# Patient Record
Sex: FEMALE | Race: WHITE | NOT HISPANIC OR LATINO | Employment: FULL TIME | URBAN - METROPOLITAN AREA
[De-identification: names, ages, dates, MRNs, and addresses within clinical notes are randomized per-mention and may not be internally consistent; named-entity substitution may affect disease eponyms.]

---

## 2017-10-05 LAB — HPV ABSTRACT: NORMAL

## 2018-05-25 ENCOUNTER — TRANSFERRED RECORDS (OUTPATIENT)
Dept: HEALTH INFORMATION MANAGEMENT | Facility: CLINIC | Age: 53
End: 2018-05-25

## 2019-09-18 ENCOUNTER — OFFICE VISIT (OUTPATIENT)
Dept: FAMILY MEDICINE | Facility: CLINIC | Age: 54
End: 2019-09-18
Payer: COMMERCIAL

## 2019-09-18 VITALS
HEIGHT: 66 IN | OXYGEN SATURATION: 97 % | TEMPERATURE: 97.6 F | BODY MASS INDEX: 23.18 KG/M2 | HEART RATE: 65 BPM | DIASTOLIC BLOOD PRESSURE: 72 MMHG | WEIGHT: 144.25 LBS | SYSTOLIC BLOOD PRESSURE: 107 MMHG

## 2019-09-18 DIAGNOSIS — Z13.820 SCREENING FOR OSTEOPOROSIS: ICD-10-CM

## 2019-09-18 DIAGNOSIS — Z12.31 VISIT FOR SCREENING MAMMOGRAM: ICD-10-CM

## 2019-09-18 DIAGNOSIS — E03.9 HYPOTHYROIDISM WITH GOITER: ICD-10-CM

## 2019-09-18 DIAGNOSIS — Z80.0 FAMILY HISTORY OF COLON CANCER: ICD-10-CM

## 2019-09-18 DIAGNOSIS — E04.9 HYPOTHYROIDISM WITH GOITER: ICD-10-CM

## 2019-09-18 DIAGNOSIS — Z00.00 ANNUAL PHYSICAL EXAM: Primary | ICD-10-CM

## 2019-09-18 DIAGNOSIS — R79.89 ELEVATED TSH: ICD-10-CM

## 2019-09-18 DIAGNOSIS — E03.9 HYPOTHYROIDISM, UNSPECIFIED TYPE: ICD-10-CM

## 2019-09-18 LAB
ERYTHROCYTE [DISTWIDTH] IN BLOOD BY AUTOMATED COUNT: 12.5 %
HBA1C MFR BLD: 5.1 % (ref 4.1–5.7)
HCT VFR BLD AUTO: 40.6 % (ref 35–47)
HEMOGLOBIN: 13.3 G/DL (ref 11.7–15.7)
MCH RBC QN AUTO: 30.4 PG (ref 26.5–35)
MCHC RBC AUTO-ENTMCNC: 32.8 G/DL (ref 32–36)
MCV RBC AUTO: 92.9 FL (ref 78–100)
PLATELET # BLD AUTO: 281 K/UL (ref 150–450)
RBC # BLD AUTO: 4.37 M/UL (ref 3.8–5.2)
T4 FREE SERPL-MCNC: 0.66 NG/DL (ref 0.76–1.46)
TSH SERPL DL<=0.005 MIU/L-ACNC: 7.43 MU/L (ref 0.4–4)
WBC # BLD AUTO: 6.2 K/UL (ref 4–11)

## 2019-09-18 ASSESSMENT — MIFFLIN-ST. JEOR: SCORE: 1277.68

## 2019-09-18 NOTE — NURSING NOTE
"53 year old  Chief Complaint   Patient presents with     Establish Care     Physical     pt wants to discuss thyroid medication.       Blood pressure 107/72, pulse 65, temperature 97.6  F (36.4  C), temperature source Oral, height 1.687 m (5' 6.42\"), weight 65.4 kg (144 lb 4 oz), SpO2 97 %, not currently breastfeeding. Body mass index is 22.99 kg/m .  There is no problem list on file for this patient.      Wt Readings from Last 2 Encounters:   09/18/19 65.4 kg (144 lb 4 oz)     BP Readings from Last 3 Encounters:   09/18/19 107/72         No current outpatient medications on file.     No current facility-administered medications for this visit.        Social History     Tobacco Use     Smoking status: Never Smoker     Smokeless tobacco: Never Used   Substance Use Topics     Alcohol use: Yes     Comment: 7 drinks a week     Drug use: Never       Health Maintenance Due   Topic Date Due     PREVENTIVE CARE VISIT  1964     HEPATITIS C SCREENING  1964     MAMMO SCREENING  1964     PAP  1964     COLONOSCOPY  09/24/1974     HIV SCREENING  09/24/1979     DTAP/TDAP/TD IMMUNIZATION (1 - Tdap) 09/24/1989     LIPID  09/24/2009     ZOSTER IMMUNIZATION (1 of 2) 09/24/2014     PHQ-2  01/01/2019     INFLUENZA VACCINE (1) 09/01/2019       No results found for: PAP      September 18, 2019 1:46 PM  "

## 2019-09-18 NOTE — LETTER
[facility]  [address]  [place] [address]  Phone: [phone]  Fax: [phone]       Date: September 23, 2019      Earnestine León Link  [address]  [facility] [address]        Dear Earnestine,    Enclosed are you lab results.  Your TSH is elevated and the T4 is suppressed.  I will send a prescription to the [facility] on [address] for levothyroxine 50mcg daily.  Be consistent with dosing, take it at the same time daily.     I would like you to return in 6 wks to have your TSH and T4 levels rechecked.    Your blood counts look perfect, there is no anemia or other abnormal findings.    Your A1c is normal, no concern for diabetes.    It was a pleasure meeting you, thanks for choosing [facility] for your care.    Sincerely,    Trish Aguilar MD  Internal Medicine/Pediatrics    Resulted Orders   Hemoglobin A1c ([place])   Result Value Ref Range    Hemoglobin A1C 5.1 4.1 - 5.7 %   TSH with free T4 reflex   Result Value Ref Range    TSH 7.43 (H) 0.40 - 4.00 mU/L   CBC with Plt ([facility])   Result Value Ref Range    WBC 6.2 4.0 - 11.0 K/uL    RBC 4.37 3.80 - 5.20 M/uL    Hemoglobin 13.3 11.7 - 15.7 g/dL    Hematocrit 40.6 35.0 - 47.0 %    MCV 92.9 78.0 - 100.0 fL    MCH 30.4 26.5 - 35.0 pg    MCHC 32.8 32.0 - 36.0 g/dL    Platelets 281.0 150.0 - 450.0 K/uL    RDW 12.5 %   T4 free   Result Value Ref Range    T4 Free 0.66 (L) 0.76 - 1.46 ng/dL

## 2019-09-18 NOTE — PROGRESS NOTES
Earnestine Mcpherson is here for a general check up.  She is new to our clinic, and was previously receiving care at [facility]. She is not fasting. She is up to date on eye exams and dental visits. Wears seat belt-yes. Bike helmet- yes.   Concerns today:    HCM  Earnestine is a 53 year old female here for check up.  She eats a generally healthy diet, but admits she could improve her diet.  Eats meat occasionally.  Mostly cooks at home.  No major weight changes, has lost some weight.  She is due for a mammogram, ordered today.  Pap completed 10/2017, normal, next due 2020.  Her colonoscopy was last completed in 01/2016, next due 2021.  She has a family hx of colon cancer and polyps.  Her immunizations are up to date.  Recommend influenza vaccine when available.  Discussed recommendation for shingles vaccine when available.  She has not received a DEXA scan.    Situational Stress  Earnestine has had some difficulty adjusting to her recent move from [place] (where she lived for 21 yrs) to [place].  She knows this move was a good decision to move, but is still in the process of adjusting to her new apartment.  She has had some difficulty falling asleep, and she believes this is a situational issue.  No hx of sleep issues.    Hypothyroidism  Earnestine has been on levothyroxine in the past, but stopped taking medication about a year ago.   She states that her TSH was normal when last checked, which caused her to stop medication.  Both of her sisters have hypothyroidism.  No constipation, diarrhea, skin changes or hair loss.    Hearing Concerns  Earnestine has concerns about potential hearing loss, as two of her siblings near her age are having difficulty hearing.  She has never had any hearing issues.  No hx of ear infections.    Family History of Diabetes  Earnestine reports family history of type II DM in thin family members, including her brother and father. No hx of gestational diabetes.  She is  requesting testing.       Health Maintenance   Topic Date Due     PREVENTIVE CARE VISIT  1964     HEPATITIS C SCREENING  1964     MAMMO SCREENING  1964     PAP  1964     COLONOSCOPY  1974     HIV SCREENING  1979     DTAP/TDAP/TD IMMUNIZATION (1 - Tdap) 1989     LIPID  2009     ZOSTER IMMUNIZATION (1 of 2) 2014     PHQ-2  2019     INFLUENZA VACCINE (1) 2019     IPV IMMUNIZATION  Aged Out     MENINGITIS IMMUNIZATION  Aged Out       There is no problem list on file for this patient.      Past Surgical History:   Procedure Laterality Date      SECTION        SECTION  1999     FINGER SURGERY Right 1966    right hand ring finger       Family History   Problem Relation Age of Onset     Hypertension Mother      Arthritis Mother      Glaucoma Mother      Diabetes Father 80     Hypothyroidism Sister      Colon Polyps Brother      Colon Cancer Other 50        paternal side     Hypothyroidism Sister      Colon Cancer Paternal Grandfather 65     Liver Cancer Paternal Aunt        Social  Recently moved to [place] from [place]  Works at [employer] a [employer]    with 3 Children, ages 26, 24 and 20    HABITS:  Tob: none  ETOH: 1-2/day  Calcium: Dairy in diet, no supplements  Caffeine: 3 coffees/day  Exercise: 4x/week; walking for 30-60 minutes    OB/GYN HISTORY:  LMP: No LMP recorded. Patient is postmenopausal. - She believes her last period was a year ago.  Hx abnormal pap?  no  STD hx?  none  Vasomotor sx:  mild  Contraception: menopausal  G 3   P 3   A 0  Self Breast exam:  yes    No current outpatient medications on file.     Allergies   Allergen Reactions     Penicillins      Breathing problems       ROS  CONSTITUTIONAL:NEGATIVE for fever, chills, change in weight  INTEGUMENTARY/SKIN: NEGATIVE for worrisome rashes, moles or lesions  EYES: NEGATIVE for vision changes or irritation  ENT/MOUTH: NEGATIVE for ear,  "mouth and throat problems  RESP:NEGATIVE for significant cough or SOB  BREAST: NEGATIVE for masses, tenderness or discharge  CV: NEGATIVE for chest pain, palpitations, FORBES, orthopnea, PND  or peripheral edema  GI: NEGATIVE for nausea, abdominal pain, heartburn, or change in bowel habits  :NEGATIVE for frequency, dysuria, or hematuria  MUSCULOSKELETAL:NEGATIVE for significant arthralgias or myalgia. Positive for intemittent right groin pain, no injury.  NEURO: NEGATIVE for weakness, dizziness or paresthesias  ENDOCRINE: hx of thyroid disease, not currently on thyroid medication  HEME/ALLERGY/IMMUNE: NEGATIVE for bleeding problems  PSYCHIATRIC: NEGATIVE for changes in mood or affect    EXAM  /72   Pulse 65   Temp 97.6  F (36.4  C) (Oral)   Ht 1.687 m (5' 6.42\")   Wt 65.4 kg (144 lb 4 oz)   SpO2 97%   Breastfeeding? No   BMI 22.99 kg/m      GENERAL APPEARANCE: Alert, pleasant, NAD  EYES: PERRL, EOMI, conjunctiva clear  HENT: TM normal bilaterally. Nose and mouth without lesions  NECK: no adenopathy, thyroid enlarged, smooth, no nodules, nontender  RESP: lungs clear to auscultation bilaterally,  BREAST: normal without masses, no tenderness or nipple discharge and no palpable  axillary masses or adenopathy  CV: regular rate and rhythm, normal S1 S2, no murmur, no carotid bruits  ABDOMEN: soft, nontender, without HSM or masses. Bowel sounds normal  MS: hips with normal internal, external ROM. No pain with palpation over lower LS or paralumbar m. No pain with palpation over lateral hips. No pain with palpation over groin or upper inner adductor muscles but she identifies this area as area of pain  SKIN: no suspicious lesions or rashes  NEURO: Normal strength and tone, sensory exam grossly normal, DTR normoreflexive in upper and lower extremities  PSYCH: mentation appears normal. and affect normal/bright.  EXT: no peripheral edema, pedal pulses palpable    Assessment:  (Z00.00) Annual physical exam  (primary " encounter diagnosis)  Comment: 53 year old female in stable health.  Plan: Hemoglobin A1c ([place]), CBC with Plt         ([facility]), AUDIOLOGY ADULT REFERRAL        Anticipatory guidance given today regarding diet, exercise and calcium intake, safety. She is up to date on colonoscopy and pap. Vaccines up to date x shingles and influenza, obtain when available.     (Z12.31) Visit for screening mammogram  Comment: Routine screening  Plan: Mammogram - routine screening         (Z13.820) Screening for osteoporosis  Comment: Routine screening  Plan: Dexa hip/pelvis/spine*            (R79.89) Elevated TSH  Comment: previous treatment for elevated TSH  Plan: TSH with free T4 reflex        Recheck and if if elevated, would resume levothyroxine in setting of enlarged thyroid gland. She is in agreement with plan.  TSH   Date Value Ref Range Status   09/18/2019 7.43 (H) 0.40 - 4.00 mU/L Final     T4 Free   Date Value Ref Range Status   09/18/2019 0.66 (L) 0.76 - 1.46 ng/dL Final     Recommend starting levothyroxine 50mcg daily, return in 6 wks for next lab check.    Trish Aguilar MD  Internal Medicine/Pediatric    I, Angus Saha, am serving as a scribe to document services personally performed by Dr. Trish Aguilar, based on data collection and the provider's statements to me. Dr. Aguilar has reviewed, edited, and approved the above note.

## 2019-09-19 PROBLEM — Z80.0 FAMILY HISTORY OF COLON CANCER: Status: ACTIVE | Noted: 2019-09-19

## 2019-09-19 PROBLEM — E04.9 HYPOTHYROIDISM WITH GOITER: Status: ACTIVE | Noted: 2019-09-19

## 2019-09-19 PROBLEM — E03.9 HYPOTHYROIDISM WITH GOITER: Status: ACTIVE | Noted: 2019-09-19

## 2019-09-19 RX ORDER — LEVOTHYROXINE SODIUM 50 UG/1
50 TABLET ORAL DAILY
Qty: 90 TABLET | Refills: 0 | Status: SHIPPED | OUTPATIENT
Start: 2019-09-19 | End: 2020-02-25

## 2019-09-20 PROBLEM — C48.8: Status: ACTIVE | Noted: 2019-09-20

## 2019-10-09 ENCOUNTER — OFFICE VISIT (OUTPATIENT)
Dept: AUDIOLOGY | Facility: CLINIC | Age: 54
End: 2019-10-09
Attending: INTERNAL MEDICINE
Payer: COMMERCIAL

## 2019-10-09 DIAGNOSIS — H90.41 SENSORINEURAL HEARING LOSS (SNHL) OF RIGHT EAR WITH UNRESTRICTED HEARING OF LEFT EAR: Primary | ICD-10-CM

## 2019-10-09 NOTE — PROGRESS NOTES
AUDIOLOGY REPORT    SUBJECTIVE:  Earnestine Mcpherson is a 54 year old female who was seen was seen in Audiology at the [facility] for audiologic evaluation, referred by Trish Aguilar MD.  The patient reports a family history of hearing loss with age. Some history of loud concerts. She denies pain, drainage, dizziness,and tinnitus.    OBJECTIVE:  Abuse Screening:  Do you feel unsafe at home or work/school? No  Do you feel threatened by someone? No  Does anyone try to keep you from having contact with others, or doing things outside of your home? No  Physical signs of abuse present? No    Fall Risk Screen:  1. Have you fallen two or more times in the past year? No  2. Have you fallen and had an injury in the past year? No    Otoscopic exam indicates ears are clear of cerumen bilaterally     Pure Tone Thresholds assessed using conventional audiometry with good  reliability from 250-8000 Hz bilaterally using insert earphones and circumaural headphones     RIGHT:  normal sloping to mild sensorineural hearing loss at 1000 Hz only rising to normal.     LEFT:     Hearing is within normal limits     Tympanogram:    RIGHT: normal eardrum mobility    LEFT:   normal eardrum mobility    Reflexes (reported by stimulus ear):  RIGHT: Ipsilateral is present at normal levels  RIGHT: Contralateral is present at normal levels  LEFT:   Ipsilateral is present at normal levels  LEFT:   Contralateral is present at normal levels      Speech Reception Threshold:    RIGHT: 10 dB HL    LEFT:   10 dB HL  Word Recognition Score:     RIGHT: 100% at 50 dB HL using NU-6 recorded word list.    LEFT:   100% at 50 dB HL using NU-6 recorded word list.      ASSESSMENT:    Today s results were discussed with the patient in detail. Essentially normal hearing bilaterally.    PLAN:  Patient was counseled regarding hearing loss and impact on communication.   It is recommended that the patient return in  2 years for repeat testing or sooner if a change occurs.  Please call this clinic with questions regarding these results or recommendations.        Sunni Newby, [facility]  Licensed Audiologist  [place] #[id number]

## 2019-10-18 ENCOUNTER — ANCILLARY PROCEDURE (OUTPATIENT)
Dept: MAMMOGRAPHY | Facility: CLINIC | Age: 54
End: 2019-10-18
Attending: INTERNAL MEDICINE
Payer: COMMERCIAL

## 2019-10-18 ENCOUNTER — ANCILLARY PROCEDURE (OUTPATIENT)
Dept: BONE DENSITY | Facility: CLINIC | Age: 54
End: 2019-10-18
Attending: INTERNAL MEDICINE
Payer: COMMERCIAL

## 2019-10-18 DIAGNOSIS — Z13.820 SCREENING FOR OSTEOPOROSIS: ICD-10-CM

## 2019-10-18 DIAGNOSIS — Z12.31 VISIT FOR SCREENING MAMMOGRAM: ICD-10-CM

## 2019-11-01 DIAGNOSIS — E04.9 HYPOTHYROIDISM WITH GOITER: Primary | ICD-10-CM

## 2019-11-01 DIAGNOSIS — E03.9 HYPOTHYROIDISM WITH GOITER: Primary | ICD-10-CM

## 2019-11-01 DIAGNOSIS — E03.9 HYPOTHYROIDISM, UNSPECIFIED TYPE: ICD-10-CM

## 2019-11-01 LAB
T4 FREE SERPL-MCNC: 0.86 NG/DL (ref 0.76–1.46)
TSH SERPL DL<=0.005 MIU/L-ACNC: 5.81 MU/L (ref 0.4–4)

## 2019-11-02 ENCOUNTER — TELEPHONE (OUTPATIENT)
Dept: FAMILY MEDICINE | Facility: CLINIC | Age: 54
End: 2019-11-02

## 2019-11-02 DIAGNOSIS — E03.9 HYPOTHYROIDISM WITH GOITER: Primary | ICD-10-CM

## 2019-11-02 DIAGNOSIS — E04.9 HYPOTHYROIDISM WITH GOITER: Primary | ICD-10-CM

## 2019-11-02 NOTE — TELEPHONE ENCOUNTER
"[facility]    Phone Message    May a detailed message be left on voicemail: yes    Reason for Call: Medication Question or concern regarding medication   Prescription Clarification  Name of Medication: levothyroxine (SYNTHROID/LEVOTHROID) 50 MCG tablet  Prescribing Provider: Dr. Aguilar   [facility]arm[facility]: [facility]   What on the order needs clarification? Recently Dr. Aguilar made a recommendation to pt about this script.  Pt was confused by the wording \"re start\" in Dr. Vanegas' message to pt. Pt is asking for clarification:  Should pt continue on the current dosage of 50 MCG's OR does Dr. Aguilar want pt to change the dose? Please call pt and let her know. Thank you.          Action Taken: Message routed to:  [facility]: [facility]  "

## 2019-11-04 RX ORDER — LEVOTHYROXINE SODIUM 75 UG/1
75 TABLET ORAL DAILY
Qty: 90 TABLET | Refills: 0 | Status: SHIPPED | OUTPATIENT
Start: 2019-11-04 | End: 2020-02-25 | Stop reason: DRUGHIGH

## 2019-11-04 NOTE — TELEPHONE ENCOUNTER
[facility]    Phone Message    May a detailed message be left on voicemail: yes    Reason for Call: Other: pt calling back to Praveena. Please call her back.     Action Taken: Message routed to:  [facility]: [facility]

## 2019-11-04 NOTE — TELEPHONE ENCOUNTER
You saw pt's recent TSH lab, and asked her to restart the levothyroxine 50 mcg daily. Pt states she is taking the 50 mcg levothyroxine for the past 6 weeks - this recent lab draw was followup for restarting the 50 mcg dosing. Do you want to change the dose?   Praveena Dodson RN  [facility]

## 2019-11-04 NOTE — TELEPHONE ENCOUNTER
LM for pt to call back - need to verify recent dosing of levothyroxine.  Praveena Dodson RN  [facility]

## 2019-11-08 ENCOUNTER — OFFICE VISIT (OUTPATIENT)
Dept: OPHTHALMOLOGY | Facility: CLINIC | Age: 54
End: 2019-11-08
Payer: COMMERCIAL

## 2019-11-08 DIAGNOSIS — Z83.511 FAMILY HISTORY OF GLAUCOMA: Primary | ICD-10-CM

## 2019-11-08 DIAGNOSIS — H52.4 PRESBYOPIA: ICD-10-CM

## 2019-11-08 ASSESSMENT — TONOMETRY
OD_IOP_MMHG: 16
IOP_METHOD: ICARE
OS_IOP_MMHG: 17

## 2019-11-08 ASSESSMENT — REFRACTION_MANIFEST
OS_SPHERE: +1.00
OD_ADD: +2.25
OS_AXIS: 017
OD_AXIS: 147
OS_CYLINDER: +0.25
OD_SPHERE: +0.75
OD_CYLINDER: +0.75
OS_ADD: +2.25

## 2019-11-08 ASSESSMENT — REFRACTION_WEARINGRX
OS_CYLINDER: +0.50
OD_SPHERE: +0.50
OD_CYLINDER: +0.75
OS_AXIS: 024
SPECS_TYPE: PAL
OS_SPHERE: +0.75
OD_ADD: +2.25
OD_AXIS: 150
OS_ADD: +2.25

## 2019-11-08 ASSESSMENT — EXTERNAL EXAM - RIGHT EYE: OD_EXAM: NORMAL

## 2019-11-08 ASSESSMENT — CUP TO DISC RATIO
OS_RATIO: 0.25
OD_RATIO: 0.25

## 2019-11-08 ASSESSMENT — EXTERNAL EXAM - LEFT EYE: OS_EXAM: NORMAL

## 2019-11-08 ASSESSMENT — SLIT LAMP EXAM - LIDS
COMMENTS: NORMAL
COMMENTS: NORMAL

## 2019-11-08 ASSESSMENT — VISUAL ACUITY
OD_CC: 20/20
METHOD: SNELLEN - LINEAR
OS_CC: J1+
OD_CC: J1+
OS_CC: 20/20
CORRECTION_TYPE: GLASSES
OS_CC+: -1

## 2019-11-08 ASSESSMENT — CONF VISUAL FIELD
OS_NORMAL: 1
OD_NORMAL: 1
METHOD: COUNTING FINGERS

## 2019-11-08 NOTE — NURSING NOTE
Chief Complaints and History of Present Illnesses   Patient presents with     Annual Eye Exam     Chief Complaint(s) and History of Present Illness(es)     Annual Eye Exam     Laterality: both eyes    Onset: years ago    Frequency: constantly    Course: stable    Treatments tried: no treatments    Pain scale: 0/10              Comments     Patient states vision has been stable since last eye exam, both eyes. Denies eye pain or irritation. Does not take eye drops.      Aym Siu COT 12:43 PM November 8, 2019

## 2019-11-08 NOTE — PROGRESS NOTES
Assessment/Plan  (Z83.511) Family history of glaucoma  (primary encounter diagnosis)  Comment: Mom, grandmother developed glaucoma. Past difficulty with applanation tonometry (prior vasovagal response reported)  Plan: Recommend monitoring annually for changes. No evidence of glaucoma today. RTC with new flashes/floaters/vision changes.     (H52.4) Presbyopia  Plan: REFRACTION [07705]        Discussed findings with patient. New spectacle prescription dispensed to patient. Patient is welcome to return to clinic with prolonged adaptation difficulties.       Complete documentation of historical and exam elements from today's encounter can  be found in the full encounter summary report (not reduplicated in this progress  note). I personally obtained the chief complaint(s) and history of present illness. I  confirmed and edited as necessary the review of systems, past medical/surgical  history, family history, social history, and examination findings as documented by  others; and I examined the patient myself. I personally reviewed the relevant tests,  images, and reports as documented above. I formulated and edited as necessary the  assessment and plan and discussed the findings and management plan with the  patient and family.    Momo Lezama, OD

## 2019-12-05 DIAGNOSIS — M25.551 HIP PAIN, RIGHT: Primary | ICD-10-CM

## 2019-12-06 ENCOUNTER — TELEPHONE (OUTPATIENT)
Dept: FAMILY MEDICINE | Facility: CLINIC | Age: 54
End: 2019-12-06

## 2019-12-06 ENCOUNTER — THERAPY VISIT (OUTPATIENT)
Dept: PHYSICAL THERAPY | Facility: CLINIC | Age: 54
End: 2019-12-06
Payer: COMMERCIAL

## 2019-12-06 DIAGNOSIS — M25.551 HIP PAIN, RIGHT: ICD-10-CM

## 2019-12-06 DIAGNOSIS — M25.551 HIP PAIN, RIGHT: Primary | ICD-10-CM

## 2019-12-06 PROCEDURE — 97161 PT EVAL LOW COMPLEX 20 MIN: CPT | Mod: GP | Performed by: PHYSICAL THERAPIST

## 2019-12-06 PROCEDURE — 97110 THERAPEUTIC EXERCISES: CPT | Mod: GP | Performed by: PHYSICAL THERAPIST

## 2019-12-06 ASSESSMENT — ACTIVITIES OF DAILY LIVING (ADL)
GETTING_INTO_AND_OUT_OF_A_BATHTUB: NO DIFFICULTY AT ALL
WALKING_UP_STEEP_HILLS: SLIGHT DIFFICULTY
GOING_UP_1_FLIGHT_OF_STAIRS: SLIGHT DIFFICULTY
WALKING_APPROXIMATELY_10_MINUTES: NO DIFFICULTY AT ALL
HEAVY_WORK: SLIGHT DIFFICULTY
RECREATIONAL_ACTIVITIES: SLIGHT DIFFICULTY
ROLLING_OVER_IN_BED: NO DIFFICULTY AT ALL
HOS_ADL_ITEM_SCORE_TOTAL: 57
LIGHT_TO_MODERATE_WORK: NO DIFFICULTY AT ALL
WALKING_15_MINUTES_OR_GREATER: SLIGHT DIFFICULTY
STANDING_FOR_15_MINUTES: NO DIFFICULTY AT ALL
HOS_ADL_HIGHEST_POTENTIAL_SCORE: 64
TWISTING/PIVOTING_ON_INVOLVED_LEG: NO DIFFICULTY AT ALL
HOW_WOULD_YOU_RATE_YOUR_CURRENT_LEVEL_OF_FUNCTION_DURING_YOUR_USUAL_ACTIVITIES_OF_DAILY_LIVING_FROM_0_TO_100_WITH_100_BEING_YOUR_LEVEL_OF_FUNCTION_PRIOR_TO_YOUR_HIP_PROBLEM_AND_0_BEING_THE_INABILITY_TO_PERFORM_ANY_OF_YOUR_USUAL_DAILY_ACTIVITIES?: 90
WALKING_INITIALLY: NO DIFFICULTY AT ALL
GOING_DOWN_1_FLIGHT_OF_STAIRS: SLIGHT DIFFICULTY
HOS_ADL_COUNT: 16
SITTING_FOR_15_MINUTES: NO DIFFICULTY AT ALL
WALKING_DOWN_STEEP_HILLS: SLIGHT DIFFICULTY
PUTTING_ON_SOCKS_AND_SHOES: NO DIFFICULTY AT ALL
HOS_ADL_SCORE(%): 89.06
STEPPING_UP_AND_DOWN_CURBS: NO DIFFICULTY AT ALL
GETTING_INTO_AND_OUT_OF_AN_AVERAGE_CAR: NO DIFFICULTY AT ALL

## 2019-12-06 NOTE — PROGRESS NOTES
"[place] Initial Evaluation  Subjective:  The history is provided by the patient. No  was used.   Type of problem:  Right hip   Condition occurred with:  Insidious onset. This is a new condition   Problem details: Complains of groin pain for last couple of months, maybe slightly longer. Has increased her walking distance recently. Not everyday, not constant. Most days has some pain. Limits comfort in activity. Will notice limping with walking when it hurts. Notices with other daily activities like stairs. After pain started, began swimming and hip doesn't hurt when swimming. Fairly painful, so will change behavior because of pain. Worst pain 5/10. No hip pain in the past. Was given some exercises that have felt good but hasn't noticed change in pain. Describes herself as an \"on or off\" exerciser, currently into swimming. Sitting is comfortable. Goal: no pain. MD order 12/6/19..   Patient reports pain:  Groin.   Symptoms are exacerbated by activity, walking, descending stairs and ascending stairs and relieved by rest.    Where condition occurred: in the community.              Pain is described as aching and is intermittent. Pain timing: worse with activity. Since onset symptoms are unchanged.       Restrictions include:  Working in normal job without restrictions.      Red flags:  None as reported by patient.                      Objective:  System                                           Hip Evaluation  HIP AROM:    Flexion: Left: WNL    Right:  WNL          Internal Rotation: Left: WNL    Right: WNL  External Rotation: Left: WNL    Right: WNL      Hip PROM:  Hip PROM:  Left Hip:    Normal  Right Hip:  Normal                          Hip Strength:    Flexion:   Left: 4+/5   Pain:  Right: 4+/5   +/-  Pain:                    Extension:  Left: 4+/5  Pain:Right: 4/5    Pain:    Abduction:  Left: 4+/5     Pain:Right: 4+/5    Pain:  Adduction:  Left: 4/5    Pain:Right: 4/5   - "  Pain:  Internal Rotation:  Left: 4+/5    Pain:Right: 4+/5   -  Pain:  External Rotation:  Left: 4+/5   Pain:  Right: 4+/5   -  Pain:  Knee Flexion:  Left: 4+/5   Pain:  Knee Extension:  Right: 5/5    Pain:        Hip Special Testing:      Right hip negative for the following special tests:  Sj; Fadir/Labrum or SLR    Hip Palpation:      Left hip tenderness not present at:  Adductors  Right hip tenderness present at:  Adductors  Functional Testing:          Quad:    Single leg squat:   Left:    Mild loss of control  Right:   Mild loss of control    Bilateral leg squat:  Mild loss of control                      General Evaluation:                              Gait:    Significant findings:  Leg length discrepency - longer on left                                     ROS    Assessment/Plan:    Patient is a 54 year old female with left side hip complaints.    Patient has the following significant findings with corresponding treatment plan.                Diagnosis 1:  Left hip/groin pain  Pain -  manual therapy and home program  Decreased strength - therapeutic exercise, therapeutic activities and home program  Impaired gait - gait training and home program  Impaired muscle performance - neuro re-education and home program  Decreased function - therapeutic activities and home program    Therapy Evaluation Codes:   1) History comprised of:   Personal factors that impact the plan of care:      None    Comorbidity factors that impact the plan of care are:      None.     Medications impacting care: None.  2) Examination of Body Systems comprised of:   Body structures and functions that impact the plan of care:      Hip.   Activity limitations that impact the plan of care are:      Stairs, Standing and Walking.  3) Clinical presentation characteristics are:   Stable/Uncomplicated.  4) Decision-Making    Low complexity using standardized patient assessment instrument and/or measureable assessment of functional  outcome.  Cumulative Therapy Evaluation is: Low complexity.    Previous and current functional limitations:  (See Goal Flow Sheet for this information)    Short term and Long term goals: (See Goal Flow Sheet for this information)     Communication ability:  Patient appears to be able to clearly communicate and understand verbal and written communication and follow directions correctly.  Treatment Explanation - The following has been discussed with the patient:   RX ordered/plan of care  Anticipated outcomes  Possible risks and side effects  This patient would benefit from PT intervention to resume normal activities.   Rehab potential is good.    Frequency:  1 X week, once daily  Duration:  for 5 weeks  Discharge Plan:  Achieve all LTG.  Independent in home treatment program.  Reach maximal therapeutic benefit.    Please refer to the daily flowsheet for treatment today, total treatment time and time spent performing 1:1 timed codes.

## 2019-12-06 NOTE — TELEPHONE ENCOUNTER
Patient request PT referral to JANET at [facility] for R hip pain. Evaluated at office visit on 9/18/19, given exercises. Patient reports no change in symptoms - not better but not worse, continues with  right hip flexor/groin pain daily.     Dr. Aguilar - OK for PT referral, or do you want imaging?    Cheri Tolbert RN  12/06/19  8:54 AM

## 2019-12-09 NOTE — PROGRESS NOTES
[place] [facility] Initial Evaluation  Subjective:       Pertinent medical history includes:  Menopausal and thyroid problems.      Current medications:  Thyroid medication. Other medications details: calcium .   Primary job tasks include:  Computer work, prolonged sitting and repetitive tasks.           Patient is [employer] .                           Objective:  System    Physical Exam    General     ROS    Assessment/Plan:           DISPLAY PLAN FREE TEXT DISPLAY PLAN FREE TEXT DISPLAY PLAN FREE TEXT

## 2019-12-11 NOTE — PROGRESS NOTES
[place] [facility] Initial Evaluation  Subjective:       Pertinent medical history includes:  Menopausal and thyroid problems.  Medical allergies: None.  Surgeries include:  Other ( x2).  Current medications:  Thyroid medication (Calcium).   Primary job tasks include:  Computer work, prolonged sitting and repetitive tasks.           Occupation: [employer] .                           Objective:  System    Physical Exam    General     ROS    Assessment/Plan:

## 2019-12-18 ENCOUNTER — THERAPY VISIT (OUTPATIENT)
Dept: PHYSICAL THERAPY | Facility: CLINIC | Age: 54
End: 2019-12-18
Payer: COMMERCIAL

## 2019-12-18 DIAGNOSIS — M25.551 HIP PAIN, RIGHT: ICD-10-CM

## 2019-12-18 PROCEDURE — 97110 THERAPEUTIC EXERCISES: CPT | Mod: GP | Performed by: PHYSICAL THERAPIST

## 2019-12-18 PROCEDURE — 97112 NEUROMUSCULAR REEDUCATION: CPT | Mod: GP | Performed by: PHYSICAL THERAPIST

## 2020-01-10 ENCOUNTER — THERAPY VISIT (OUTPATIENT)
Dept: PHYSICAL THERAPY | Facility: CLINIC | Age: 55
End: 2020-01-10
Payer: COMMERCIAL

## 2020-01-10 DIAGNOSIS — M25.551 HIP PAIN, RIGHT: ICD-10-CM

## 2020-01-10 PROCEDURE — 97110 THERAPEUTIC EXERCISES: CPT | Mod: GP | Performed by: PHYSICAL THERAPIST

## 2020-01-10 PROCEDURE — 97112 NEUROMUSCULAR REEDUCATION: CPT | Mod: GP | Performed by: PHYSICAL THERAPIST

## 2020-02-18 PROBLEM — M25.551 HIP PAIN, RIGHT: Status: RESOLVED | Noted: 2019-12-06 | Resolved: 2020-02-18

## 2020-02-18 NOTE — PROGRESS NOTES
DISCHARGE REPORT    Progress reporting period is from 12-06-19 to 1-10-20.       SUBJECTIVE   Subjective: Reports she is doing well.     Current Pain level: 0/10.      Initial Pain level: 8/10.   Changes in function:  Yes (See Goal flowsheet attached for changes in current functional level)  Adverse reaction to treatment or activity: None    OBJECTIVE  Changes noted in objective findings:  Patient has failed to return to therapy so current objective findings are unknown. and The objective findings below are from DOS 1-10-20.  Objective: Has only had a few episodes of sharp pain. Has been swimming with no pain. Has done a little walking not a ton. No limping .Tenderness has been more medial groin area. Swims for 25 min longest walk was around [address]  no discomfort.  MMT: slight weakness Abd & ER on R. Added lunges and TB to clams. Wants a program in the end to do 4x a week.RTC 4 weeks.  ASSESSMENT/PLAN  Updated problem list and treatment plan: Diagnosis 1:  R hip pain    STG/LTGs have been met or progress has been made towards goals:  Yes (See Goal flow sheet completed today.)  Assessment of Progress: The patient has not returned to therapy. Current status is unknown.  Self Management Plans:  Patient has been instructed in a home treatment program.    Earnestine continues to require the following intervention to meet STG and LTG's:  NA    Recommendations:  DC    Please refer to the daily flowsheet for treatment today, total treatment time and time spent performing 1:1 timed codes.

## 2020-02-24 DIAGNOSIS — E03.9 HYPOTHYROIDISM WITH GOITER: ICD-10-CM

## 2020-02-24 DIAGNOSIS — E04.9 HYPOTHYROIDISM WITH GOITER: ICD-10-CM

## 2020-02-24 LAB
T4 FREE SERPL-MCNC: 0.94 NG/DL (ref 0.76–1.46)
TSH SERPL DL<=0.005 MIU/L-ACNC: 6.16 MU/L (ref 0.4–4)

## 2020-02-25 DIAGNOSIS — E03.9 HYPOTHYROIDISM WITH GOITER: Primary | ICD-10-CM

## 2020-02-25 DIAGNOSIS — E04.9 HYPOTHYROIDISM WITH GOITER: Primary | ICD-10-CM

## 2020-02-25 RX ORDER — LEVOTHYROXINE SODIUM 88 UG/1
88 TABLET ORAL DAILY
Qty: 90 TABLET | Refills: 0 | Status: SHIPPED | OUTPATIENT
Start: 2020-02-25 | End: 2020-06-08

## 2020-06-05 DIAGNOSIS — E03.9 HYPOTHYROIDISM WITH GOITER: ICD-10-CM

## 2020-06-05 DIAGNOSIS — E04.9 HYPOTHYROIDISM WITH GOITER: ICD-10-CM

## 2020-06-05 RX ORDER — LEVOTHYROXINE SODIUM 88 UG/1
88 TABLET ORAL DAILY
Qty: 90 TABLET | Refills: 0 | Status: CANCELLED | OUTPATIENT
Start: 2020-06-05

## 2020-06-05 NOTE — TELEPHONE ENCOUNTER
Last time prescribed: 2/25/2020 , 90 tabs x 0 refills  Last office visit: 9/18/19  Next appointment: No future appointments    Medication is being filled for 1 time refill only due to:  Patient needs labs TSH - order already in chart.   Praveena Dodson RN  [facility]

## 2020-06-08 DIAGNOSIS — E04.9 HYPOTHYROIDISM WITH GOITER: ICD-10-CM

## 2020-06-08 DIAGNOSIS — E03.9 HYPOTHYROIDISM WITH GOITER: ICD-10-CM

## 2020-06-08 LAB — TSH SERPL DL<=0.005 MIU/L-ACNC: 2.93 MU/L (ref 0.4–4)

## 2020-06-08 RX ORDER — LEVOTHYROXINE SODIUM 88 UG/1
88 TABLET ORAL DAILY
Qty: 90 TABLET | Refills: 3 | Status: SHIPPED | OUTPATIENT
Start: 2020-06-08 | End: 2021-07-12

## 2020-10-12 NOTE — PROGRESS NOTES
"  Family Medicine Video Visit Note  Earnestine Mcpherson is a 55 year old female who is being evaluated via a billable video visit.  Consent documented below.  Chief Complaint   Patient presents with     Musculoskeletal Problem     low back and neck muscle strain pain x 2 mons      The patient has been notified of following:     Can you please confirm what state you are currently located in? [place]   \"If you are located in a state other than [place] we cannot proceed with your visit.  This is due to state licensing laws that do not allow your provider to practice in another state.  This is not a billing or insurance issue. Please let me know if you need prescriptions filled or have other immediate concerns and I will get that message to your care team. I can also have you speak to our  to make an appointment when you are back in the [place].\"       Video Visit Consent     \"This video visit will be conducted via a call between you and your physician/provider. We have found that certain health care needs can be provided without the need for an in-person physical exam.  This service lets us provide the care you need with a video conversation.  If a prescription is necessary we can send it directly to your pharmacy.  If lab work is needed we can place an order for that and you can then stop by our lab to have the test done at a later time.    Video visits are billed at different rates depending on your insurance coverage.  Please reach out to your insurance provider with any questions.    If during the course of the call the physician/provider feels a video visit is not appropriate, you will not be charged for this service.\"    Patient has given verbal consent for Video visit? Yes  How would you like to obtain your AVS? MyChart  If you are dropped from the video visit, the video invite should be resent to:   Will anyone else be joining your video visit? No  :[id number]}  Earnestine Mcpherson is a 55 year old female " who presents to clinic today for the following health issues:    Start time: 11:49 AM  End time: 11:59 AM  Total : 10 minutes    Neck pain, mid back and low back strain  Earnestine is a 56 yo woman working from home since March when the COVID pandemic started. She works full time in front of a computer. She has noted some neck, mid back and low back strain for the past 2 months. She is stretching and walking daily which helps. She previously had an ergonomic chair at work and would like to bring that home to her current work environment.   She needs a letter written to her employer in support of this recommendation.   Currently taking ibuprofen. No radiation of pain into arms or legs.    Vaccine update  Flu vaccine one week ago drive through at work,            Patient Active Problem List   Diagnosis     Hypothyroidism with goiter     Family history of colon cancer     Primary adenocarcinoma of overlapping sites of retroperitoneum, peritoneum, and omentum (H)     Past Surgical History:   Procedure Laterality Date      SECTION        SECTION       COLONOSCOPY  2016    due every 5 yr     FINGER SURGERY Right 1966    right hand ring finger       Social History     Tobacco Use     Smoking status: Never Smoker     Smokeless tobacco: Never Used   Substance Use Topics     Alcohol use: Yes     Comment: 7 drinks a week     Family History   Problem Relation Age of Onset     Hypertension Mother      Arthritis Mother      Glaucoma Mother      Macular Degeneration Mother      Diabetes Father 80     Hypothyroidism Sister      Colon Polyps Brother      Colon Cancer Other 50        paternal side     Hypothyroidism Sister      Colon Cancer Paternal Grandfather 65     Liver Cancer Paternal Aunt      Diabetes Brother      Glaucoma Maternal Grandmother            Reviewed and updated as needed this visit by Provider       Review of Systems   Constitutional, HEENT, cardiovascular, pulmonary, gi and gu systems are  "negative, except as otherwise noted.      Objective     Ht 1.702 m (5' 7\")   Wt 63.5 kg (140 lb)   BMI 21.93 kg/m       Physical Exam     GENERAL: Healthy, alert and no distress  EYES: Eyes grossly normal to inspection.  No discharge or erythema, or obvious scleral/conjunctival abnormalities.  RESP: No audible wheeze, cough, or visible cyanosis.  No visible retractions or increased work of breathing.    SKIN: Visible skin clear. No significant rash, abnormal pigmentation or lesions.  NEURO: Cranial nerves grossly intact.  Mentation and speech appropriate for age.  PSYCH: Mentation appears normal, affect normal/bright, judgement and insight intact, normal speech and appearance well-groomed.        ASSESSMENT:  (M54.2) Neck pain  (primary encounter diagnosis)  (M54.6,  G89.29) Chronic midline thoracic back pain  (M54.5) Lumbar back pain  Comment: Worsening symptoms over past several months, dull aching stiff muscles from sitting long periods of time. Wishes to have her ergonomic chair that she used at her desk for her job. She is now working remotely  Plan: wrote letter on her behalf, recommending ergonomic chair for home use.   Discussed stretches, warm packs, frequent breaks, analgesics if needed      Video-Visit Details    Type of service:  Video Visit  Start time: 11:49 AM  End time: 11:59 AM  Total : 10 minutes    Originating Location (pt. Location): Home    Distant Location (provider location):  [facility]   Platform used for Video Visit: [facility]    No follow-ups on file.       Trish Aguilar MD                    "

## 2020-10-13 ENCOUNTER — VIRTUAL VISIT (OUTPATIENT)
Dept: FAMILY MEDICINE | Facility: CLINIC | Age: 55
End: 2020-10-13
Payer: COMMERCIAL

## 2020-10-13 VITALS — WEIGHT: 140 LBS | BODY MASS INDEX: 21.97 KG/M2 | HEIGHT: 67 IN

## 2020-10-13 DIAGNOSIS — M54.6 CHRONIC MIDLINE THORACIC BACK PAIN: ICD-10-CM

## 2020-10-13 DIAGNOSIS — M54.50 LUMBAR BACK PAIN: ICD-10-CM

## 2020-10-13 DIAGNOSIS — G89.29 CHRONIC MIDLINE THORACIC BACK PAIN: ICD-10-CM

## 2020-10-13 DIAGNOSIS — M54.2 NECK PAIN: Primary | ICD-10-CM

## 2020-10-13 ASSESSMENT — MIFFLIN-ST. JEOR: SCORE: 1257.67

## 2020-10-13 NOTE — LETTER
October 13, 2020    RE: Earnestine Mcpherson  [address] [place] [address]          To whom it may concern,      Earnestine Mcpherson is my patient at the [facility].     I am recommending an ergonomic chair, to be used at home, while working, to help alleviate neck, middle and lower back pain.    Sincerely,              Trish Aguilar MD  Internal Medicine/Pediatrics

## 2021-01-03 ENCOUNTER — HEALTH MAINTENANCE LETTER (OUTPATIENT)
Age: 56
End: 2021-01-03

## 2021-04-02 ENCOUNTER — MYC MEDICAL ADVICE (OUTPATIENT)
Dept: FAMILY MEDICINE | Facility: CLINIC | Age: 56
End: 2021-04-02

## 2021-04-02 DIAGNOSIS — Z12.11 SCREENING FOR COLON CANCER: Primary | ICD-10-CM

## 2021-04-02 NOTE — TELEPHONE ENCOUNTER
Found the colonoscopy report in Care Everywhere - done 1/22/16 - does say to return in 5 yrs - history of colon polyps. Let us know about placing the order.  Praveena Dodson RN  [facility]

## 2021-04-12 DIAGNOSIS — Z12.31 VISIT FOR SCREENING MAMMOGRAM: ICD-10-CM

## 2021-04-12 PROCEDURE — 77067 SCR MAMMO BI INCL CAD: CPT | Mod: TC | Performed by: RADIOLOGY

## 2021-05-06 ENCOUNTER — TELEPHONE (OUTPATIENT)
Dept: GASTROENTEROLOGY | Facility: OUTPATIENT CENTER | Age: 56
End: 2021-05-06

## 2021-05-06 NOTE — TELEPHONE ENCOUNTER
Screening Questions  1. What insurance is in the chart? bcbs    2.  Ordering/Referring Provider: Trish Aguilar    3. BMI: 5'6'/145=23.4    4. Are you on daily home oxygen? N    5. Do you have a history of difficult airway? N    6. Have you had a heart, lung, or liver transplant? N    7. Have you had a stroke or Transient ischemic atttack (TIA) within 3 months? N    8. In the past year, have you had any heart related issues including cardiomyopathy or a MI within 6 months, that required cardiac stenting or other implantable devices? N    9. What type of implantable device do you have (any pacemaker, defib, LVAD)? N    10. Do you take nitroglycerin? If yes, how often? N    11. Are you currently taking any blood thinners?N    12. Are you a diabetic? N    13. (Females) Are you currently pregnant? N  If yes, how many weeks?    14. Have you had a procedure in the past that was difficult to tolerate with conscious sedation? Any allergies to Fentanyl or Versed N    15. Are you taking any scheduled prescription narcotics more than once daily? N    16. Do you have any chemical dependencies such as alcohol, street drugs, or methadone? N    17. Do you have any history of post-traumatic stress syndrome or mental health issues? N    18. Do you transfer independently? Y    19.  Do you have any issues with constipation? N    20. Preferred Pharmacy for Pre Prescription : [facility]- [address] [place] [address]/ [phone]    Scheduling Details    Procedure Scheduled: COLON  Provider/Surgeon: HUSSEIN  Date of Procedure: 7/15  Location: [facility]  Caller (Please ask for phone number if not scheduled by patient): PATIENT      Sedation Type: CS  Conscious Sedation- Needs  for 6 hours after the procedure  MAC/General-Needs  for 24 hours after procedure    Pre-op Required at [facility] and OR for  MAC sedation: N  (if yes advise patient they will need a pre-op prior to procedure)      Is patient on blood thinners?  - (If yes- inform patient to follow up with PCP or provider for follow up instructions)     Informed patient they will need an adult  N  Cannot take any type of public or medical transportation alone    Informed Patient of COVID Test Requirement Y    Confirmed Nurse will call to complete assessment Y    Additional comments:

## 2021-06-06 DIAGNOSIS — Z11.59 ENCOUNTER FOR SCREENING FOR OTHER VIRAL DISEASES: ICD-10-CM

## 2021-07-09 ENCOUNTER — TELEPHONE (OUTPATIENT)
Dept: GASTROENTEROLOGY | Facility: CLINIC | Age: 56
End: 2021-07-09

## 2021-07-09 NOTE — TELEPHONE ENCOUNTER
Attempted to contact patient regarding upcoming colonoscopy procedure on 7.15.2021 for pre assessment questions. No answer.     Left message to return call to [phone] #[phone]    Covid test scheduled 7.12.2021    Mariah Lyle RN

## 2021-07-12 DIAGNOSIS — Z11.59 ENCOUNTER FOR SCREENING FOR OTHER VIRAL DISEASES: ICD-10-CM

## 2021-07-12 DIAGNOSIS — E03.9 HYPOTHYROIDISM WITH GOITER: ICD-10-CM

## 2021-07-12 DIAGNOSIS — E04.9 HYPOTHYROIDISM WITH GOITER: ICD-10-CM

## 2021-07-12 PROCEDURE — U0005 INFEC AGEN DETEC AMPLI PROBE: HCPCS | Mod: 90 | Performed by: PATHOLOGY

## 2021-07-12 PROCEDURE — U0003 INFECTIOUS AGENT DETECTION BY NUCLEIC ACID (DNA OR RNA); SEVERE ACUTE RESPIRATORY SYNDROME CORONAVIRUS 2 (SARS-COV-2) (CORONAVIRUS DISEASE [COVID-19]), AMPLIFIED PROBE TECHNIQUE, MAKING USE OF HIGH THROUGHPUT TECHNOLOGIES AS DESCRIBED BY CMS-2020-01-R: HCPCS | Mod: 90 | Performed by: PATHOLOGY

## 2021-07-12 RX ORDER — LEVOTHYROXINE SODIUM 88 UG/1
88 TABLET ORAL DAILY
Qty: 30 TABLET | Refills: 0 | Status: SHIPPED | OUTPATIENT
Start: 2021-07-12 | End: 2021-08-13

## 2021-07-12 NOTE — TELEPHONE ENCOUNTER
Writer reviewed pre-assessment questions with patient prior to upcoming colonoscopy on 7.15.2021.  COVID test scheduled for 7.12.2021.    Reviewed miralax and magnesium citrate prep instructions with patient.  Noting no nuts, seeds, or popcorn prior to procedure.     Designated  policy reviewed with patient.     Patient verbalized understanding.  No further questions or concerns.    Mariah Lyle RN

## 2021-07-12 NOTE — TELEPHONE ENCOUNTER
Last Office Visit: 10/13/20  [facility] [place]: None  Medication last refilled: 6/8/20 #90 3refills                 Ref Range          10/13/20     2/24/20  TSH 0.40 - 4.00 mU/L 2.93  6.16     Medication is being filled for 1 time refill only due to:  Patient needs labs TSH with Free T4 . Future labs ordered No. Patient needs to be seen because due for annual exam.    [employer] message sent to patient requesting she call our schedulers to schedule annual exam visit with Dr. Aguilar.    Liz Velasco, RN, BSN

## 2021-07-13 LAB — SARS-COV-2 RNA RESP QL NAA+PROBE: NEGATIVE

## 2021-07-15 ENCOUNTER — HOSPITAL ENCOUNTER (OUTPATIENT)
Facility: AMBULATORY SURGERY CENTER | Age: 56
Discharge: HOME | End: 2021-07-15
Attending: STUDENT IN AN ORGANIZED HEALTH CARE EDUCATION/TRAINING PROGRAM | Admitting: STUDENT IN AN ORGANIZED HEALTH CARE EDUCATION/TRAINING PROGRAM
Payer: COMMERCIAL

## 2021-07-15 VITALS
RESPIRATION RATE: 16 BRPM | DIASTOLIC BLOOD PRESSURE: 54 MMHG | HEIGHT: 67 IN | WEIGHT: 150 LBS | BODY MASS INDEX: 23.54 KG/M2 | HEART RATE: 49 BPM | TEMPERATURE: 97.2 F | SYSTOLIC BLOOD PRESSURE: 99 MMHG | OXYGEN SATURATION: 98 %

## 2021-07-15 DIAGNOSIS — Z80.0 FAMILY HISTORY OF COLON CANCER: Primary | ICD-10-CM

## 2021-07-15 LAB — COLONOSCOPY: NORMAL

## 2021-07-15 PROCEDURE — 45380 COLONOSCOPY AND BIOPSY: CPT | Mod: 33

## 2021-07-15 PROCEDURE — 88305 TISSUE EXAM BY PATHOLOGIST: CPT | Performed by: PATHOLOGY

## 2021-07-15 RX ORDER — SIMETHICONE
LIQUID (ML) MISCELLANEOUS PRN
Status: DISCONTINUED | OUTPATIENT
Start: 2021-07-15 | End: 2021-07-15 | Stop reason: HOSPADM

## 2021-07-15 RX ORDER — NALOXONE HYDROCHLORIDE 0.4 MG/ML
0.2 INJECTION, SOLUTION INTRAMUSCULAR; INTRAVENOUS; SUBCUTANEOUS
Status: DISCONTINUED | OUTPATIENT
Start: 2021-07-15 | End: 2021-07-16 | Stop reason: HOSPADM

## 2021-07-15 RX ORDER — LIDOCAINE 40 MG/G
CREAM TOPICAL
Status: DISCONTINUED | OUTPATIENT
Start: 2021-07-15 | End: 2021-07-15 | Stop reason: HOSPADM

## 2021-07-15 RX ORDER — PROCHLORPERAZINE MALEATE 10 MG
10 TABLET ORAL EVERY 6 HOURS PRN
Status: DISCONTINUED | OUTPATIENT
Start: 2021-07-15 | End: 2021-07-16 | Stop reason: HOSPADM

## 2021-07-15 RX ORDER — FLUMAZENIL 0.1 MG/ML
0.2 INJECTION, SOLUTION INTRAVENOUS
Status: ACTIVE | OUTPATIENT
Start: 2021-07-15 | End: 2021-07-15

## 2021-07-15 RX ORDER — NALOXONE HYDROCHLORIDE 0.4 MG/ML
0.4 INJECTION, SOLUTION INTRAMUSCULAR; INTRAVENOUS; SUBCUTANEOUS
Status: DISCONTINUED | OUTPATIENT
Start: 2021-07-15 | End: 2021-07-16 | Stop reason: HOSPADM

## 2021-07-15 RX ORDER — ONDANSETRON 2 MG/ML
4 INJECTION INTRAMUSCULAR; INTRAVENOUS
Status: DISCONTINUED | OUTPATIENT
Start: 2021-07-15 | End: 2021-07-15 | Stop reason: HOSPADM

## 2021-07-15 RX ORDER — ONDANSETRON 4 MG/1
4 TABLET, ORALLY DISINTEGRATING ORAL EVERY 6 HOURS PRN
Status: DISCONTINUED | OUTPATIENT
Start: 2021-07-15 | End: 2021-07-16 | Stop reason: HOSPADM

## 2021-07-15 RX ORDER — FENTANYL CITRATE 50 UG/ML
INJECTION, SOLUTION INTRAMUSCULAR; INTRAVENOUS PRN
Status: DISCONTINUED | OUTPATIENT
Start: 2021-07-15 | End: 2021-07-15 | Stop reason: HOSPADM

## 2021-07-15 RX ORDER — ONDANSETRON 2 MG/ML
4 INJECTION INTRAMUSCULAR; INTRAVENOUS EVERY 6 HOURS PRN
Status: DISCONTINUED | OUTPATIENT
Start: 2021-07-15 | End: 2021-07-16 | Stop reason: HOSPADM

## 2021-07-15 ASSESSMENT — MIFFLIN-ST. JEOR: SCORE: 1303.03

## 2021-07-15 NOTE — LETTER
"July 19, 2021      Earnestine Mcpherson  [address] [address]        Dear ,    We are writing to inform you of your test results.    {results letter list:[id number]}    Resulted Orders   Surgical Pathology Exam   Result Value Ref Range    Case Report       Surgical Pathology Report                         Case: [id number]                                  Authorizing Provider:  Ramila Gay MD          Collected:           07/15/2021 07:52 AM          Ordering Location:     [facility] OR  Received:            07/15/2021 08:13 AM                                 [place]                                                                  Pathologist:           Alba Vega MD                                                             Specimens:   A) - Other, Sigmoid Polyp                                                                           B) - Other, Rectal Polyp X 2                                                               Final Diagnosis       A. SIGMOID COLON POLYP:  -Colonic mucosa with lymphoid aggregate  -No evidence of neoplastic polyp     B. RECTAL POLYP X 2:  -Colonic mucosa with no significant histopathologic abnormality  -No evidence of neoplastic polyp         Clinical Information       Colorectal cancer screening      Case Images      Gross Description       A. Other, Sigmoid Polyp:  The specimen is received in formalin with proper patient identification, labeled \"sigmoid polyp\".  The specimen consists of 2 irregular tan pieces of soft tissue 0.2 to 0.3 cm in greatest dimension which are entirely submitted in cassette A1.    B. Other, Rectal Polyp X 2:  The specimen is received in formalin with proper patient identification, labeled \"rectal polyp x2\".  The specimen consists of 2 irregular tan pieces of soft tissue 0.4 and 0.5 cm in greatest dimension which are entirely submitted in cassette B1.        Microscopic Description       Microscopic examination " has been performed.         Performing Labs       The technical component of this testing was completed at [facility]         If you have any questions or concerns, please call the clinic at the number listed above.       Sincerely,      Ramila Gay MD

## 2021-07-15 NOTE — LETTER
"July 19, 2021      Earnestine Mcpherson  [address] [facility] [address]        Dear ,    We are writing to inform you of your test results.    The \"polyps\" removed during your colonoscopy returned as normal colonic mucosa. Given no adenomatous (pre-cancerous) polyps were found on this colonoscopy, or your colonoscopy in 2016,  I recommend that you undergo a repeat colonoscopy in 10 years. This interval is affected by your family history. Based on our most recent guidelines, patients with one first degree relative (ex. Sibling) or two second degree relatives (ex. Aunt) with colon cancer diagnosed prior to age 60, should get a repeat colonoscopy in 5 years. However, a sooner examination might be necessary if you start developing any symptoms such as rectal bleeding, change in bowel habits, anemia, etc.  Please discuss this with your primary physician at the time of your next office appointment.  Your primary physician will schedule this repeat colonoscopy at the appropriate time.    Please share this information with your family members so they can discuss with their primary physician their need for colorectal cancer screening.      It has been a pleasure to participate in your care.  Please call our clinic if you have any questions or concerns.      Resulted Orders   Surgical Pathology Exam   Result Value Ref Range    Case Report       Surgical Pathology Report                         Case: [id number]                                  Authorizing Provider:  Ramila Gay MD          Collected:           07/15/2021 07:52 AM          Ordering Location:     [facility] OR  Received:            07/15/2021 08:13 AM                                 [place]                                                                  Pathologist:           Alba Vega MD                                                             Specimens:   A) - Other, Sigmoid Polyp                                   " "                                        B) - Other, Rectal Polyp X 2                                                               Final Diagnosis       A. SIGMOID COLON POLYP:  -Colonic mucosa with lymphoid aggregate  -No evidence of neoplastic polyp     B. RECTAL POLYP X 2:  -Colonic mucosa with no significant histopathologic abnormality  -No evidence of neoplastic polyp         Clinical Information       Colorectal cancer screening      Case Images      Gross Description       A. Other, Sigmoid Polyp:  The specimen is received in formalin with proper patient identification, labeled \"sigmoid polyp\".  The specimen consists of 2 irregular tan pieces of soft tissue 0.2 to 0.3 cm in greatest dimension which are entirely submitted in cassette A1.    B. Other, Rectal Polyp X 2:  The specimen is received in formalin with proper patient identification, labeled \"rectal polyp x2\".  The specimen consists of 2 irregular tan pieces of soft tissue 0.4 and 0.5 cm in greatest dimension which are entirely submitted in cassette B1.        Microscopic Description       Microscopic examination has been performed.         Performing Labs       The technical component of this testing was completed at [facility]         If you have any questions or concerns, please call the clinic at the number listed above.       Sincerely,      Ramila Gay MD          "

## 2021-07-15 NOTE — H&P
Earnestine León Link  [id number]  female  55 year old      Reason for procedure/surgery: Colonoscopy for history of polyps and family history of colon cancer in 2nd degree relatives    Patient Active Problem List   Diagnosis     Hypothyroidism with goiter     Family history of colon cancer     Primary adenocarcinoma of overlapping sites of retroperitoneum, peritoneum, and omentum (H)       Past Surgical History:    Past Surgical History:   Procedure Laterality Date      SECTION        SECTION       COLONOSCOPY  2016    due every 5 yr     FINGER SURGERY Right 1966    right hand ring finger       Past Medical History:   Past Medical History:   Diagnosis Date     Thyroid disease        Social History:   Social History     Tobacco Use     Smoking status: Never Smoker     Smokeless tobacco: Never Used   Substance Use Topics     Alcohol use: Yes     Comment: 7 drinks a week       Family History:   Family History   Problem Relation Age of Onset     Hypertension Mother      Arthritis Mother      Glaucoma Mother      Macular Degeneration Mother      Diabetes Father 80     Hypothyroidism Sister      Colon Polyps Brother      Colon Cancer Other 50        paternal side     Hypothyroidism Sister      Colon Cancer Paternal Grandfather 65     Liver Cancer Paternal Aunt      Diabetes Brother      Glaucoma Maternal Grandmother        Allergies:   Allergies   Allergen Reactions     Penicillins      Breathing problems       Active Medications:   Current Outpatient Medications   Medication Sig Dispense Refill     levothyroxine (SYNTHROID/LEVOTHROID) 88 MCG tablet Take 1 tablet (88 mcg) by mouth daily 30 tablet 0       Systemic Review:   CONSTITUTIONAL: NEGATIVE for fever, chills, change in weight  ENT/MOUTH: NEGATIVE for ear, mouth and throat problems  RESP: NEGATIVE for significant cough or SOB  CV: NEGATIVE for chest pain, palpitations or peripheral edema    Physical Examination:   Vital Signs: /71    "Pulse 71   Temp 97.5  F (36.4  C) (Temporal)   Resp 18   Ht 1.702 m (5' 7\")   Wt 68 kg (150 lb)   SpO2 99%   BMI 23.49 kg/m    GENERAL: healthy, alert and no distress  NECK: no adenopathy, no asymmetry, masses, or scars  RESP: lungs clear to auscultation - no rales, rhonchi or wheezes  CV: regular rate and rhythm, normal S1 S2, no S3 or S4, no murmur, click or rub, no peripheral edema and peripheral pulses strong  ABDOMEN: soft, nontender, no hepatosplenomegaly, no masses and bowel sounds normal  MS: no gross musculoskeletal defects noted, no edema    Plan: Appropriate to proceed as scheduled.      Ramila Gay MD  7/15/2021    PCP:  Trish Aguilar    "

## 2021-07-16 LAB
PATH REPORT.COMMENTS IMP SPEC: NORMAL
PATH REPORT.COMMENTS IMP SPEC: NORMAL
PATH REPORT.FINAL DX SPEC: NORMAL
PATH REPORT.GROSS SPEC: NORMAL
PATH REPORT.MICROSCOPIC SPEC OTHER STN: NORMAL
PATH REPORT.RELEVANT HX SPEC: NORMAL
PHOTO IMAGE: NORMAL

## 2021-08-13 ENCOUNTER — OFFICE VISIT (OUTPATIENT)
Dept: FAMILY MEDICINE | Facility: CLINIC | Age: 56
End: 2021-08-13

## 2021-08-13 VITALS
DIASTOLIC BLOOD PRESSURE: 75 MMHG | TEMPERATURE: 97.1 F | WEIGHT: 153.5 LBS | OXYGEN SATURATION: 99 % | SYSTOLIC BLOOD PRESSURE: 116 MMHG | HEART RATE: 63 BPM | HEIGHT: 67 IN | BODY MASS INDEX: 24.09 KG/M2

## 2021-08-13 DIAGNOSIS — R22.1 NECK MASS: ICD-10-CM

## 2021-08-13 DIAGNOSIS — E04.9 HYPOTHYROIDISM WITH GOITER: Primary | ICD-10-CM

## 2021-08-13 DIAGNOSIS — R63.5 WEIGHT GAIN: ICD-10-CM

## 2021-08-13 DIAGNOSIS — E03.9 HYPOTHYROIDISM WITH GOITER: Primary | ICD-10-CM

## 2021-08-13 DIAGNOSIS — Z13.0 SCREENING FOR DEFICIENCY ANEMIA: ICD-10-CM

## 2021-08-13 LAB
FERRITIN SERPL-MCNC: 10 NG/ML (ref 8–252)
HBA1C MFR BLD: 5.3 % (ref 0–5.6)
TSH SERPL DL<=0.005 MIU/L-ACNC: 2.62 MU/L (ref 0.4–4)

## 2021-08-13 PROCEDURE — 84443 ASSAY THYROID STIM HORMONE: CPT | Performed by: INTERNAL MEDICINE

## 2021-08-13 RX ORDER — LEVOTHYROXINE SODIUM 88 UG/1
88 TABLET ORAL DAILY
Qty: 90 TABLET | Refills: 3 | Status: SHIPPED | OUTPATIENT
Start: 2021-08-13 | End: 2022-04-20

## 2021-08-13 ASSESSMENT — MIFFLIN-ST. JEOR: SCORE: 1318.9

## 2021-08-13 NOTE — PATIENT INSTRUCTIONS
Calcium 1200mg daily  Vitamin D 1000 international unit(s) daily    Bobbi Laurent, nutritionist at [facility] number [phone]

## 2021-08-13 NOTE — NURSING NOTE
"55 year old  Chief Complaint   Patient presents with     Recheck Medication     med refill levothyroxine        Blood pressure 116/75, pulse 63, temperature 97.1  F (36.2  C), temperature source Temporal, height 1.702 m (5' 7\"), weight 69.6 kg (153 lb 8 oz), SpO2 99 %, not currently breastfeeding. Body mass index is 24.04 kg/m .  Patient Active Problem List   Diagnosis     Hypothyroidism with goiter     Family history of colon cancer     Primary adenocarcinoma of overlapping sites of retroperitoneum, peritoneum, and omentum (H)       Wt Readings from Last 2 Encounters:   08/13/21 69.6 kg (153 lb 8 oz)   07/15/21 68 kg (150 lb)     BP Readings from Last 3 Encounters:   08/13/21 116/75   07/15/21 99/54   09/18/19 107/72         Current Outpatient Medications   Medication     levothyroxine (SYNTHROID/LEVOTHROID) 88 MCG tablet     No current facility-administered medications for this visit.       Social History     Tobacco Use     Smoking status: Never Smoker     Smokeless tobacco: Never Used   Substance Use Topics     Alcohol use: Yes     Comment: 7 drinks a week     Drug use: Never       Health Maintenance Due   Topic Date Due     ADVANCE CARE PLANNING  Never done     HIV SCREENING  Never done     HEPATITIS C SCREENING  Never done     LIPID  Never done     ZOSTER IMMUNIZATION (1 of 2) Never done     PREVENTIVE CARE VISIT  09/18/2020     PHQ-2  01/01/2021       No results found for: PAP      August 13, 2021 10:11 AM  "

## 2021-08-14 NOTE — PROGRESS NOTES
"Earnestine Mcpherson is a 55 year old female here for the following issues:       Hypothyroidism  Earnestine is a 54 yo woman with hx of hypothyroidism. She takes levothyroxine 88mcg daily. She is consistent with dosing. Denies constipation/diarrhea. Has had 13 lb weight gain in the past year. She is due for labs today.     Weight gain  Earnestine is concerned about weight gain. Up 10 lbs since October 2020. Cooking at home, exercising regularly, 60 min at a time. Etoh intake 2 per day. Menopause since age 53.   Her goal is return to weight of 140 lbs.  Wt Readings from Last 4 Encounters:   08/13/21 69.6 kg (153 lb 8 oz)   07/15/21 68 kg (150 lb)   10/13/20 63.5 kg (140 lb)   09/18/19 65.4 kg (144 lb 4 oz)   Body mass index is 24.04 kg/m .        Patient Active Problem List   Diagnosis     Hypothyroidism with goiter     Family history of colon cancer     Primary adenocarcinoma of overlapping sites of retroperitoneum, peritoneum, and omentum (H)       Current Outpatient Medications   Medication Sig Dispense Refill     calcium-vitamin D (OSCAL) 250-125 MG-UNIT TABS per tablet Take 1 tablet by mouth 2 times daily       levothyroxine (SYNTHROID/LEVOTHROID) 88 MCG tablet Take 1 tablet (88 mcg) by mouth daily 30 tablet 0       Allergies   Allergen Reactions     Penicillins      Breathing problems        EXAM  /75 (BP Location: Left arm, Patient Position: Sitting, Cuff Size: Adult Large)   Pulse 63   Temp 97.1  F (36.2  C) (Temporal)   Ht 1.702 m (5' 7\")   Wt 69.6 kg (153 lb 8 oz)   SpO2 99%   BMI 24.04 kg/m    Gen: Alert, pleasant, NAD  Neck: no LAD or TM  Diffuse soft tissue mass at the base of the neck, nontender  COR: S1,S2, no murmur  Lungs: CTA bilaterally, no rhonchi, wheezes or rales      Assessment:  (E03.9,  E04.9) Hypothyroidism with goiter  (primary encounter diagnosis)  Comment: currently taking levothyroxine 88mcg daily  TSH in target range  TSH   Date Value Ref Range Status   08/13/2021 2.62 0.40 - 4.00 " mU/L Final   06/08/2020 2.93 0.40 - 4.00 mU/L Final     Plan: TSH with free T4 reflex, Hemoglobin A1c  Levothyroxine 88mcg tablet        Refilled levothyroxine Rx x 1 yr    (R22.1) Neck mass  Comment: midline soft tissue mass, below level of the thyroid to level of the manubrium. Nontender.   Suspect benign lipoma vs thyroid goiter  Plan: Correspondence with Earnestine after visit today via My chart, suggesting thyroid US to distinguish thyroid tissue vs lipoma.     (Z13.0) Screening for deficiency anemia  Comment:  After clinic visit Earnestine sent My Chart inquiry to have CBC added to labs today. She reports she was unable to donate blood due to hemoglobin level. She had recent colonoscopy-- unremarkable, 2 polyps that were actually colonic tissue  Plan: CBC with Platelets, Ferritin        Add on CBC and Ferritin level.     (R63.5) Weight gain  Comment: up 10 lbs since October 2020. She would like to lose weight to goal 140 lbs  Plan: recommend she meet with our nutritionist at [facility], Bobbi Laurent.    66 minutes spend on the date of this encounter doing chart review, history and exam, documentation and further activities as noted above.     Trish Aguilar MD  Internal Medicine/Pediatrics

## 2021-08-16 ENCOUNTER — OFFICE VISIT (OUTPATIENT)
Dept: FAMILY MEDICINE | Facility: CLINIC | Age: 56
End: 2021-08-16

## 2021-08-16 DIAGNOSIS — Z13.0 SCREENING FOR DEFICIENCY ANEMIA: ICD-10-CM

## 2021-08-16 DIAGNOSIS — Z71.3 DIETARY COUNSELING AND SURVEILLANCE: Primary | ICD-10-CM

## 2021-08-16 LAB
ERYTHROCYTE [DISTWIDTH] IN BLOOD BY AUTOMATED COUNT: 16.5 % (ref 10–15)
HCT VFR BLD AUTO: 38.3 % (ref 35–47)
HGB BLD-MCNC: 12.2 G/DL (ref 11.7–15.7)
MCH RBC QN AUTO: 27.4 PG (ref 26.5–33)
MCHC RBC AUTO-ENTMCNC: 31.9 G/DL (ref 31.5–36.5)
MCV RBC AUTO: 86 FL (ref 78–100)
PLATELET # BLD AUTO: 260 10E3/UL (ref 150–450)
RBC # BLD AUTO: 4.46 10E6/UL (ref 3.8–5.2)
WBC # BLD AUTO: 4.8 10E3/UL (ref 4–11)

## 2021-08-16 NOTE — PATIENT INSTRUCTIONS
1. Moderate carbohydrate intake with goal of 2 servings per meal, focusing on complex carbohydrate sources. Discussed serving sizes.   2. Decrease to 1 alcoholic beverage per day  3. Discouraged liberalizing calorie intake based on calories burned during exercise. Kcal expenditure is very difficult to accurately estimate.   4. Weigh once per week, targeting 1/2-1 lb weight loss weekly    Monitoring/Evaluation:  No follow up planned currently, encouraged MyChart as needed    Bobbi Laurent RD

## 2021-08-16 NOTE — PROGRESS NOTES
Portal Nutrition Assessment    Earnestine Mcpherson is a 55 year old female who presents for weight management, diabetes and cancer prevention diet. Has been wanting a visit with nutritionist for a while. Has gained 10 lbs in the past year, and not feeling that great. Family history of diabetes and colon cancer. Unsure of what to eat to prevent these conditions. Also anemic, but still in work up for etiology. Very goal oriented and would prefer a strict plan.     Recent diet recall:  Coffee with 2% milk (2-3 cups)  Breakfast 10 am: 1 egg and 2 pc toast; oatmeal and fruit  Lunch: giant salad; previously sandwich  Snacks: not a big snacker, cheese and crackers  Dinner: grilled salmon, brats, side salad or bread  Dessert: not a lot  Alcohol: 2 drinks per day (beer or wine)    Social: , working remotely ([employer])  Physical activity: 60 minutes exercise per day - walks or bikes (in the last 3 weeks, previously more like 30 minutes)  Medications: levothyroxine  Vitamins/Supplements: Vit D    Recent weights:   Wt Readings from Last 6 Encounters:   08/13/21 69.6 kg (153 lb 8 oz)   07/15/21 68 kg (150 lb)   10/13/20 63.5 kg (140 lb)   09/18/19 65.4 kg (144 lb 4 oz)     Recent A1C values:   Hemoglobin A1C   Date Value Ref Range Status   08/13/2021 5.3 0.0 - 5.6 % Final     Comment:     Normal <5.7%   Prediabetes 5.7-6.4%    Diabetes 6.5% or higher     Note: Adopted from ADA consensus guidelines.   09/18/2019 5.1 4.1 - 5.7 % Final     Recent lipid values:   No results found for: CHOL  No results found for: HDL  No results found for: LDL  No results found for: TRIG    Intervention(s):  Earnestine Mcpherson is a 55 year old female who presents for weight management, diabetes and cancer prevention diet.   1. Moderate carbohydrate intake with goal of 2 servings per meal, focusing on complex carbohydrate sources. Discussed serving sizes.   2. Decrease to 1 alcoholic beverage per day  3. Discouraged  liberalizing calorie intake based on calories burned during exercise. Kcal expenditure is very difficult to accurately estimate.   4. Weigh once per week, targeting 1/2-1 lb weight loss weekly    Monitoring/Evaluation:  No follow up planned currently, encouraged MyChart as needed    Patient referred by Trish Aguilar MD   Total time spent with patient 45 minutes    Bobbi Laurent RD

## 2021-08-17 ENCOUNTER — MYC MEDICAL ADVICE (OUTPATIENT)
Dept: FAMILY MEDICINE | Facility: CLINIC | Age: 56
End: 2021-08-17

## 2021-08-17 DIAGNOSIS — R79.0 LOW FERRITIN LEVEL: Primary | ICD-10-CM

## 2021-08-17 NOTE — TELEPHONE ENCOUNTER
[employer] message sent to patient relaying the below information and labs ordered.  Requested patient call scheduling to make lab only visit for 8 weeks.    Trish Aguilar MD  P [facility] Nurse [place]  Phone Number: [phone]   I would have her take Ferrous Sulfate 325mg + Vitamin C 100mg (take together) once daily. Repeat labs in 8 wks.  Buy supplement OTC     Place order for future CBC and Ferritin level (diagnosis is low ferritin).     Thanks     Trish Velasco RN, BSN  [facility]  08/17/21  2:05 PM

## 2021-08-23 ENCOUNTER — ANCILLARY PROCEDURE (OUTPATIENT)
Dept: ULTRASOUND IMAGING | Facility: CLINIC | Age: 56
End: 2021-08-23
Attending: INTERNAL MEDICINE
Payer: COMMERCIAL

## 2021-08-23 DIAGNOSIS — R22.1 NECK MASS: ICD-10-CM

## 2021-08-23 PROBLEM — E04.2 NON-TOXIC MULTINODULAR GOITER: Status: ACTIVE | Noted: 2021-08-23

## 2021-08-23 PROCEDURE — 76536 US EXAM OF HEAD AND NECK: CPT | Mod: GC

## 2021-10-10 ENCOUNTER — HEALTH MAINTENANCE LETTER (OUTPATIENT)
Age: 56
End: 2021-10-10

## 2021-10-18 ENCOUNTER — ALLIED HEALTH/NURSE VISIT (OUTPATIENT)
Dept: FAMILY MEDICINE | Facility: CLINIC | Age: 56
End: 2021-10-18

## 2021-10-18 ENCOUNTER — LAB (OUTPATIENT)
Dept: LAB | Facility: CLINIC | Age: 56
End: 2021-10-18

## 2021-10-18 DIAGNOSIS — Z23 NEED FOR PROPHYLACTIC VACCINATION AND INOCULATION AGAINST INFLUENZA: ICD-10-CM

## 2021-10-18 DIAGNOSIS — R79.0 LOW FERRITIN LEVEL: ICD-10-CM

## 2021-10-18 DIAGNOSIS — Z23 NEED FOR SHINGLES VACCINE: Primary | ICD-10-CM

## 2021-10-18 LAB
ERYTHROCYTE [DISTWIDTH] IN BLOOD BY AUTOMATED COUNT: 15.4 % (ref 10–15)
FERRITIN SERPL-MCNC: 23 NG/ML (ref 8–252)
HCT VFR BLD AUTO: 40.5 % (ref 35–47)
HGB BLD-MCNC: 13.3 G/DL (ref 11.7–15.7)
MCH RBC QN AUTO: 30.7 PG (ref 26.5–33)
MCHC RBC AUTO-ENTMCNC: 32.8 G/DL (ref 31.5–36.5)
MCV RBC AUTO: 94 FL (ref 78–100)
PLATELET # BLD AUTO: 243 10E3/UL (ref 150–450)
RBC # BLD AUTO: 4.33 10E6/UL (ref 3.8–5.2)
WBC # BLD AUTO: 4.7 10E3/UL (ref 4–11)

## 2021-10-18 PROCEDURE — 82728 ASSAY OF FERRITIN: CPT

## 2021-10-18 PROCEDURE — 85027 COMPLETE CBC AUTOMATED: CPT

## 2021-10-18 NOTE — PROGRESS NOTES
"Injectable Influenza Immunization Documentation    1.  Has the patient received the information for the injectable influenza vaccine? YES     2. Is the patient 6 months of age or older? YES     3. Does the patient have any of the following contraindications?         Severe allergy to eggs? No     Severe allergic reaction to previous influenza vaccines? No   Severe allergy to latex? No       History of Guillain-[place] syndrome? No     Currently have a temperature greater than 100.4F? No        4.  Severely egg allergic patients should have flu vaccine eligibility assessed by an MD, RN, or pharmacist, and those who received flu vaccine should be observed for 15 min by an MD, RN, Pharmacist, Medical Technician, or member of clinic staff.\": YES    5. Latex-allergic patients should be given latex-free influenza vaccine Yes. Please reference the Vaccine latex table to determine if your clinic s product is latex-containing.       Vaccination given by Kala Alarcon [facility]  October 18, 2021 9:22 AM      Prior to immunization administration, verified patients identity using patient s name and date of birth. Please see Immunization Activity for additional information.     Screening Questionnaire for Adult Immunization    Are you sick today?   No   Do you have allergies to medications, food, a vaccine component or latex?   No   Have you ever had a serious reaction after receiving a vaccination?   No   Do you have a long-term health problem with heart, lung, kidney, or metabolic disease (e.g., diabetes), asthma, a blood disorder, no spleen, complement component deficiency, a cochlear implant, or a spinal fluid leak?  Are you on long-term aspirin therapy?   No   Do you have cancer, leukemia, HIV/AIDS, or any other immune system problem?   No   Do you have a parent, brother, or sister with an immune system problem?   No   In the past 3 months, have you taken medications that affect  your immune system, such as prednisone, other " steroids, or anticancer drugs; drugs for the treatment of rheumatoid arthritis, Crohn s disease, or psoriasis; or have you had radiation treatments?   No   Have you had a seizure, or a brain or other nervous system problem?   No   During the past year, have you received a transfusion of blood or blood    products, or been given immune (gamma) globulin or antiviral drug?   No   For women: Are you pregnant or is there a chance you could become       pregnant during the next month?   No   Have you received any vaccinations in the past 4 weeks?   No     Immunization questionnaire answers were all negative.        Per orders of Dr. Aguilar, injection of Shingrix given by Kala Alarcon CMA. Patient instructed to remain in clinic for 15 minutes afterwards, and to report any adverse reaction to me immediately.       Screening performed by Kala Alarcon CMA on 10/18/2021 at 9:22 AM.    Earnestine Mcpherson comes into clinic today at the request of Dr. Aguilar Ordering Provider for Flu shot and shingrix.    This service provided today was under the supervising provider of the day Dr. Phillips, who was available if needed.    Kala Alarcon CMA

## 2021-12-23 ENCOUNTER — OFFICE VISIT (OUTPATIENT)
Dept: OBGYN | Facility: CLINIC | Age: 56
End: 2021-12-23
Attending: OBSTETRICS & GYNECOLOGY
Payer: COMMERCIAL

## 2021-12-23 VITALS
SYSTOLIC BLOOD PRESSURE: 118 MMHG | HEIGHT: 67 IN | BODY MASS INDEX: 24.34 KG/M2 | DIASTOLIC BLOOD PRESSURE: 74 MMHG | HEART RATE: 73 BPM | WEIGHT: 155.1 LBS

## 2021-12-23 DIAGNOSIS — N95.0 POSTMENOPAUSAL BLEEDING: Primary | ICD-10-CM

## 2021-12-23 PROCEDURE — 99203 OFFICE O/P NEW LOW 30 MIN: CPT | Performed by: OBSTETRICS & GYNECOLOGY

## 2021-12-23 PROCEDURE — G0463 HOSPITAL OUTPT CLINIC VISIT: HCPCS

## 2021-12-23 RX ORDER — RIBOFLAVIN (VITAMIN B2) 100 MG
TABLET ORAL
COMMUNITY
Start: 2021-09-01

## 2021-12-23 RX ORDER — FERROUS SULFATE 325(65) MG
TABLET ORAL
COMMUNITY
Start: 2021-09-01

## 2021-12-23 ASSESSMENT — PATIENT HEALTH QUESTIONNAIRE - PHQ9
SUM OF ALL RESPONSES TO PHQ QUESTIONS 1-9: 2
5. POOR APPETITE OR OVEREATING: NOT AT ALL

## 2021-12-23 ASSESSMENT — MIFFLIN-ST. JEOR: SCORE: 1321.16

## 2021-12-23 ASSESSMENT — PAIN SCALES - GENERAL: PAINLEVEL: NO PAIN (0)

## 2021-12-23 ASSESSMENT — ANXIETY QUESTIONNAIRES
3. WORRYING TOO MUCH ABOUT DIFFERENT THINGS: NOT AT ALL
1. FEELING NERVOUS, ANXIOUS, OR ON EDGE: NOT AT ALL
7. FEELING AFRAID AS IF SOMETHING AWFUL MIGHT HAPPEN: NOT AT ALL
2. NOT BEING ABLE TO STOP OR CONTROL WORRYING: NOT AT ALL
5. BEING SO RESTLESS THAT IT IS HARD TO SIT STILL: NOT AT ALL
GAD7 TOTAL SCORE: 0
6. BECOMING EASILY ANNOYED OR IRRITABLE: NOT AT ALL

## 2021-12-23 NOTE — NURSING NOTE
Chief Complaint   Patient presents with     Consult     Post nolberto spotting. LNMP about 4 years ago.       See SHARI Augustine 12/23/2021

## 2021-12-23 NOTE — PROGRESS NOTES
"[facility]    CC: PMB    S: 56 year old here for evaluation of PMB. She has noted several instances of brown discharge with wiping. No cramping or red blood noted. Unsure when she went through menopause, but probably around age 49. Denies other vaginal symptoms, has some dryness with intercourse but uses lubricant. Denies any urinary issues, no blood in urine. Does not think any blood was from rectum.     Past Medical History:   Diagnosis Date     Thyroid disease      Past Surgical History:   Procedure Laterality Date      SECTION        SECTION       COLONOSCOPY  2016    due every 5 yr     COLONOSCOPY N/A 7/15/2021    Procedure: COLONOSCOPY, WITH POLYPECTOMY;  Surgeon: Ramila Gay MD;  Location: [facility] OR     FINGER SURGERY Right 1966    right hand ring finger     Family History   Problem Relation Age of Onset     Hypertension Mother      Arthritis Mother      Glaucoma Mother      Macular Degeneration Mother      Diabetes Father 80        thin habitus     Hypothyroidism Sister      Hypothyroidism Sister      Colon Polyps Brother      Diabetes Brother 40        thin habitus     Glaucoma Maternal Grandmother      Colon Cancer Paternal Grandfather 65     Liver Cancer Paternal Aunt      Colon Cancer Other 50        paternal side     Crohn's Disease Daughter 21       O: /74 (BP Location: Left arm, Patient Position: Chair)   Pulse 73   Ht 1.702 m (5' 7\")   Wt 70.4 kg (155 lb 1.6 oz)   Breastfeeding No   BMI 24.29 kg/m    General: No distress  Abdomen: Soft, non-tender, non-distended, no masses  Pelvic Exam:  Vulva: No external lesions, normal hair distribution, normal architecture  Vagina: Moist, pink, no abnormal discharge, well rugated, no lesions  Cervix: smooth, pink, no visible lesions  Uterus: Normal size, anteverted, non-tender, mobile  Adnexa: Non-tender, no masses    A:56 year old with postmenopausal bleeding    P: Reviewed evaluation of PMB " including starting with EMB vs US. Plan to start with US- will call with plan following US.       Mariah Lewis MD FACOG

## 2021-12-23 NOTE — LETTER
"2021       RE: Earnestine Mcpherson  [address]  [place] [address]     Dear Colleague,    Thank you for referring your patient, Earnestine Mcpherson, to the [facility] [place] [facility]. Please see a copy of my visit note below.    [facility]    CC: PMB    S: 56 year old here for evaluation of PMB. She has noted several instances of brown discharge with wiping. No cramping or red blood noted. Unsure when she went through menopause, but probably around age 49. Denies other vaginal symptoms, has some dryness with intercourse but uses lubricant. Denies any urinary issues, no blood in urine. Does not think any blood was from rectum.     Past Medical History:   Diagnosis Date     Thyroid disease      Past Surgical History:   Procedure Laterality Date      SECTION        SECTION       COLONOSCOPY  2016    due every 5 yr     COLONOSCOPY N/A 7/15/2021    Procedure: COLONOSCOPY, WITH POLYPECTOMY;  Surgeon: Ramila Gay MD;  Location: [facility] OR     FINGER SURGERY Right 1966    right hand ring finger     Family History   Problem Relation Age of Onset     Hypertension Mother      Arthritis Mother      Glaucoma Mother      Macular Degeneration Mother      Diabetes Father 80        thin habitus     Hypothyroidism Sister      Hypothyroidism Sister      Colon Polyps Brother      Diabetes Brother 40        thin habitus     Glaucoma Maternal Grandmother      Colon Cancer Paternal Grandfather 65     Liver Cancer Paternal Aunt      Colon Cancer Other 50        paternal side     Crohn's Disease Daughter 21       O: /74 (BP Location: Left arm, Patient Position: Chair)   Pulse 73   Ht 1.702 m (5' 7\")   Wt 70.4 kg (155 lb 1.6 oz)   Breastfeeding No   BMI 24.29 kg/m    General: No distress  Abdomen: Soft, non-tender, non-distended, no masses  Pelvic Exam:  Vulva: No external " lesions, normal hair distribution, normal architecture  Vagina: Moist, pink, no abnormal discharge, well rugated, no lesions  Cervix: smooth, pink, no visible lesions  Uterus: Normal size, anteverted, non-tender, mobile  Adnexa: Non-tender, no masses    A:56 year old with postmenopausal bleeding    P: Reviewed evaluation of PMB including starting with EMB vs US. Plan to start with US- will call with plan following US.       Mariah Lewis MD FACOG

## 2021-12-24 ASSESSMENT — ANXIETY QUESTIONNAIRES: GAD7 TOTAL SCORE: 0

## 2021-12-29 ENCOUNTER — ANCILLARY PROCEDURE (OUTPATIENT)
Dept: ULTRASOUND IMAGING | Facility: CLINIC | Age: 56
End: 2021-12-29
Attending: OBSTETRICS & GYNECOLOGY
Payer: COMMERCIAL

## 2021-12-29 DIAGNOSIS — N95.0 POSTMENOPAUSAL BLEEDING: ICD-10-CM

## 2021-12-29 PROCEDURE — 76830 TRANSVAGINAL US NON-OB: CPT | Mod: 26 | Performed by: OBSTETRICS & GYNECOLOGY

## 2021-12-29 PROCEDURE — 76830 TRANSVAGINAL US NON-OB: CPT

## 2022-01-24 ENCOUNTER — ALLIED HEALTH/NURSE VISIT (OUTPATIENT)
Dept: FAMILY MEDICINE | Facility: CLINIC | Age: 57
End: 2022-01-24
Payer: COMMERCIAL

## 2022-01-24 DIAGNOSIS — Z23 NEED FOR SHINGLES VACCINE: Primary | ICD-10-CM

## 2022-01-24 NOTE — PROGRESS NOTES
Prior to immunization administration, verified patients identity using patient s name and date of birth. Please see Immunization Activity for additional information.     Screening Questionnaire for Adult Immunization    Are you sick today?   No   Do you have allergies to medications, food, a vaccine component or latex?   No   Have you ever had a serious reaction after receiving a vaccination?   No   Do you have a long-term health problem with heart, lung, kidney, or metabolic disease (e.g., diabetes), asthma, a blood disorder, no spleen, complement component deficiency, a cochlear implant, or a spinal fluid leak?  Are you on long-term aspirin therapy?   No   Do you have cancer, leukemia, HIV/AIDS, or any other immune system problem?   No   Do you have a parent, brother, or sister with an immune system problem?   No   In the past 3 months, have you taken medications that affect  your immune system, such as prednisone, other steroids, or anticancer drugs; drugs for the treatment of rheumatoid arthritis, Crohn s disease, or psoriasis; or have you had radiation treatments?   No   Have you had a seizure, or a brain or other nervous system problem?   No   During the past year, have you received a transfusion of blood or blood    products, or been given immune (gamma) globulin or antiviral drug?   No   For women: Are you pregnant or is there a chance you could become       pregnant during the next month?   No   Have you received any vaccinations in the past 4 weeks?   No     Immunization questionnaire answers were all negative.        Per orders of Dr. Aguilar, injection of 2nd Shingrix given by Kala Alarcon CMA. Patient instructed to remain in clinic for 15 minutes afterwards, and to report any adverse reaction to me immediately.       Screening performed by Kala Alarcon CMA on 1/24/2022 at 10:17 AM.    Earnestine Romelia Ky comes into clinic today at the request of Dr. Aguilar Ordering Provider for Shingrix.    This service  provided today was under the supervising provider of the day Dr. Phillips, who was available if needed.    Kala Alarcon, CMA

## 2022-01-30 ENCOUNTER — HEALTH MAINTENANCE LETTER (OUTPATIENT)
Age: 57
End: 2022-01-30

## 2022-03-21 ENCOUNTER — MYC MEDICAL ADVICE (OUTPATIENT)
Dept: FAMILY MEDICINE | Facility: CLINIC | Age: 57
End: 2022-03-21
Payer: COMMERCIAL

## 2022-03-21 DIAGNOSIS — M25.552 HIP PAIN, LEFT: Primary | ICD-10-CM

## 2022-04-13 ENCOUNTER — THERAPY VISIT (OUTPATIENT)
Dept: PHYSICAL THERAPY | Facility: CLINIC | Age: 57
End: 2022-04-13
Attending: INTERNAL MEDICINE
Payer: COMMERCIAL

## 2022-04-13 DIAGNOSIS — M25.552 HIP PAIN, LEFT: ICD-10-CM

## 2022-04-13 PROCEDURE — 97161 PT EVAL LOW COMPLEX 20 MIN: CPT | Mod: GP | Performed by: PHYSICAL THERAPIST

## 2022-04-13 ASSESSMENT — ACTIVITIES OF DAILY LIVING (ADL)
LIGHT_TO_MODERATE_WORK: SLIGHT DIFFICULTY
STEPPING_UP_AND_DOWN_CURBS: NO DIFFICULTY AT ALL
PUTTING_ON_SOCKS_AND_SHOES: NO DIFFICULTY AT ALL
STANDING_FOR_15_MINUTES: NO DIFFICULTY AT ALL
WALKING_DOWN_STEEP_HILLS: SLIGHT DIFFICULTY
DEEP_SQUATTING: NO DIFFICULTY AT ALL
ROLLING_OVER_IN_BED: SLIGHT DIFFICULTY
RECREATIONAL_ACTIVITIES: SLIGHT DIFFICULTY
HOS_ADL_HIGHEST_POTENTIAL_SCORE: 68
WALKING_15_MINUTES_OR_GREATER: SLIGHT DIFFICULTY
GETTING_INTO_AND_OUT_OF_AN_AVERAGE_CAR: NO DIFFICULTY AT ALL
GOING_DOWN_1_FLIGHT_OF_STAIRS: SLIGHT DIFFICULTY
HOS_ADL_ITEM_SCORE_TOTAL: 58
HOS_ADL_SCORE(%): 85.29
HEAVY_WORK: NO DIFFICULTY AT ALL
HOS_ADL_COUNT: 17
SITTING_FOR_15_MINUTES: NO DIFFICULTY AT ALL
WALKING_INITIALLY: SLIGHT DIFFICULTY
GETTING_INTO_AND_OUT_OF_A_BATHTUB: SLIGHT DIFFICULTY
WALKING_APPROXIMATELY_10_MINUTES: SLIGHT DIFFICULTY
GOING_UP_1_FLIGHT_OF_STAIRS: NO DIFFICULTY AT ALL
WALKING_UP_STEEP_HILLS: SLIGHT DIFFICULTY
HOW_WOULD_YOU_RATE_YOUR_CURRENT_LEVEL_OF_FUNCTION_DURING_YOUR_USUAL_ACTIVITIES_OF_DAILY_LIVING_FROM_0_TO_100_WITH_100_BEING_YOUR_LEVEL_OF_FUNCTION_PRIOR_TO_YOUR_HIP_PROBLEM_AND_0_BEING_THE_INABILITY_TO_PERFORM_ANY_OF_YOUR_USUAL_DAILY_ACTIVITIES?: 90
TWISTING/PIVOTING_ON_INVOLVED_LEG: NO DIFFICULTY AT ALL

## 2022-04-13 NOTE — PROGRESS NOTES
Physical Therapy Initial Evaluation  Subjective:  The history is provided by the patient. No  was used.   Patient Health History  Earnestine Mcpherson being seen for L hip pain.     Problem began: 12/1/2021.   Problem occurred: Unknown   Pain is reported as 2/10 on pain scale.  General health as reported by patient is good.     Red flags:  None as reported by patient.  Medical allergies: other. Other medical allergies details: Penicillin.   Surgeries include:  Other. Other surgery history details: C section x2.    Current medications:  Thyroid medication.       Primary job tasks include:  Computer work.                  Therapist Generated HPI Evaluation  Problem details: Used to have pain in the right hip.  Pain in the left groin or lateral hip.  Mostly feels it walking.  Favors it at the beginning of a week. Started strengthening with a group 2 times a week.  Plays pickle ball 2 times a week.  Stiffness getting started with this and feels fatigue.  Family history of hip arthritis. Walks for 20 minutes.         Type of problem:  Left hip.      Condition occurred with:  Insidious onset.  Where condition occurred: for unknown reasons.  Patient reports pain:  Groin and lateral.  Pain is described as aching and is intermittent.  Pain radiates to:  No radiation. Pain is worse during the day.  Since onset symptoms are gradually worsening.  Associated symptoms:  Loss of strength. Symptoms are exacerbated by walking  and relieved by activity/movement.  Special tests included:  Other.    Restrictions due to condition include:  Working in normal job without restrictions.  Barriers include:  None as reported by patient.                        Objective:  System                                           Hip Evaluation  Hip PROM:    Flexion: Left: 120 with pain   Right: 130        Internal Rotation: Left: 15 with pain    Right: 25  External Rotation: Left: 45    Right: 50      Pain: worse with passive hip  IR        Hip Strength:    Flexion:   Left: 5/5   Pain:  Right: 5/5   Pain:                    Extension:  Right: 5-/5    Pain:    Abduction:  Right: 5-/5    Pain:  Adduction:  Left: 5-/5    Pain:                Hip Special Testing:    Left hip positive for the following special tests:  Fadir/Labrum      Hip Palpation:  Normal         Functional Testing:          Quad:    Single leg squat:   Left:    Mild loss of control  Right:   Mild loss of control    Bilateral leg squat:  Shift to the right  Mild loss of control     Proprioception:    Stork balance test:   Left:    20  Right:  30  % of Uninvolved:                General     ROS    Assessment/Plan:    Patient is a 56 year old female with left side hip complaints.    Patient has the following significant findings with corresponding treatment plan.                Diagnosis 1:  Left hip pain  Pain -  manual therapy, self management, education and home program  Decreased ROM/flexibility - manual therapy, therapeutic exercise and home program  Decreased joint mobility - manual therapy, therapeutic exercise and home program  Decreased strength - therapeutic exercise, therapeutic activities and home program  Decreased function - therapeutic activities and home program    Therapy Evaluation Codes:   1) History comprised of:   Personal factors that impact the plan of care:      None.    Comorbidity factors that impact the plan of care are:      None.     Medications impacting care: None.  2) Examination of Body Systems comprised of:   Body structures and functions that impact the plan of care:      Hip.   Activity limitations that impact the plan of care are:      Sports, Standing and Walking.  3) Clinical presentation characteristics are:   Stable/Uncomplicated.  4) Decision-Making    Low complexity using standardized patient assessment instrument and/or measureable assessment of functional outcome.  Cumulative Therapy Evaluation is: Low complexity.    Previous and  current functional limitations:  (See Goal Flow Sheet for this information)    Short term and Long term goals: (See Goal Flow Sheet for this information)     Communication ability:  Patient appears to be able to clearly communicate and understand verbal and written communication and follow directions correctly.  Treatment Explanation - The following has been discussed with the patient:   RX ordered/plan of care  Anticipated outcomes  Possible risks and side effects  This patient would benefit from PT intervention to resume normal activities.   Rehab potential is excellent.    Frequency:  1 X a month, once daily  Duration:  for 3 months  Discharge Plan:  Achieve all LTG.  Independent in home treatment program.  Reach maximal therapeutic benefit.    Please refer to the daily flowsheet for treatment today, total treatment time and time spent performing 1:1 timed codes.

## 2022-04-20 ENCOUNTER — OFFICE VISIT (OUTPATIENT)
Dept: FAMILY MEDICINE | Facility: CLINIC | Age: 57
End: 2022-04-20
Payer: COMMERCIAL

## 2022-04-20 ENCOUNTER — TELEPHONE (OUTPATIENT)
Dept: ENDOCRINOLOGY | Facility: CLINIC | Age: 57
End: 2022-04-20
Payer: COMMERCIAL

## 2022-04-20 VITALS
DIASTOLIC BLOOD PRESSURE: 73 MMHG | TEMPERATURE: 97.7 F | HEIGHT: 67 IN | SYSTOLIC BLOOD PRESSURE: 106 MMHG | BODY MASS INDEX: 23.86 KG/M2 | OXYGEN SATURATION: 99 % | WEIGHT: 152.04 LBS | HEART RATE: 85 BPM

## 2022-04-20 DIAGNOSIS — E04.9 HYPOTHYROIDISM WITH GOITER: ICD-10-CM

## 2022-04-20 DIAGNOSIS — E03.9 HYPOTHYROIDISM WITH GOITER: ICD-10-CM

## 2022-04-20 DIAGNOSIS — E04.1 THYROID NODULE: Primary | ICD-10-CM

## 2022-04-20 LAB — TSH SERPL DL<=0.005 MIU/L-ACNC: 2.83 MU/L (ref 0.4–4)

## 2022-04-20 PROCEDURE — 84443 ASSAY THYROID STIM HORMONE: CPT | Performed by: INTERNAL MEDICINE

## 2022-04-20 RX ORDER — LEVOTHYROXINE SODIUM 88 UG/1
88 TABLET ORAL DAILY
Qty: 90 TABLET | Refills: 3 | Status: SHIPPED | OUTPATIENT
Start: 2022-04-20 | End: 2023-01-14 | Stop reason: DRUGHIGH

## 2022-04-20 NOTE — NURSING NOTE
"56 year old  Chief Complaint   Patient presents with     Derm Problem     Pt has some growth on her neck that she noticed last Aug.       Blood pressure 106/73, pulse 85, temperature 97.7  F (36.5  C), height 1.702 m (5' 7.01\"), weight 69 kg (152 lb 0.6 oz), SpO2 99 %, not currently breastfeeding. Body mass index is 23.81 kg/m .  Patient Active Problem List   Diagnosis     Hypothyroidism with goiter     Family history of colon cancer     Non-toxic multinodular goiter     Hip pain, left       Wt Readings from Last 2 Encounters:   04/20/22 69 kg (152 lb 0.6 oz)   12/23/21 70.4 kg (155 lb 1.6 oz)     BP Readings from Last 3 Encounters:   04/20/22 106/73   12/23/21 118/74   08/13/21 116/75         Current Outpatient Medications   Medication     Calcium Citrate-Vitamin D (CALCIUM CITRATE+D3 PETITES) 200-250 MG-UNIT TABS     ferrous sulfate (FEROSUL) 325 (65 Fe) MG tablet     levothyroxine (SYNTHROID/LEVOTHROID) 88 MCG tablet     vitamin C (ASCORBIC ACID) 100 MG tablet     No current facility-administered medications for this visit.       Social History     Tobacco Use     Smoking status: Never Smoker     Smokeless tobacco: Never Used   Vaping Use     Vaping Use: Never used   Substance Use Topics     Alcohol use: Yes     Comment: 7 drinks a week     Drug use: Never       Health Maintenance Due   Topic Date Due     ADVANCE CARE PLANNING  Never done     HIV SCREENING  Never done     HEPATITIS C SCREENING  Never done     LIPID  Never done     PREVENTIVE CARE VISIT  09/18/2020     PHQ-2 (once per calendar year)  01/01/2022     HPV TEST  09/19/2022     PAP  09/19/2022       No results found for: PAP      April 20, 2022 9:53 AM  "

## 2022-04-20 NOTE — TELEPHONE ENCOUNTER
[facility] Call Center    Phone Message    May a detailed message be left on voicemail: yes     Reason for Call: Appointment Intake    Referring Provider Name: Trish Aguilar MD   Diagnosis and/or Symptoms: Thyroid Nodules/Goiter    Protocols state that if no appointment available within 2 weeks to schedule and send encounter for review. Please review, patient is scheduled on 8/25/2022  Action Taken: Message routed to:  [facility] endo    Travel Screening: Not Applicable

## 2022-04-20 NOTE — PROGRESS NOTES
"Earnestine Mcpherson is a 56 year old female here for the following issues:    Hypothyroidism with goiter   I saw Earnestine on 21 for a follow-up on her hypothyroidism. At that visit, I noted a non-tender midline soft tissue mass at the base of her neck.  Suspected benign lipoma vs thyroid goiter and recommended US to distinguish. Thyroid US was completed on 21 and found the followin. There are 2 small hypoechoic thyroid nodules in the left upper lobe corresponding to nodules #4 and #6 in the findings above. Recommend follow-up in one year by ACR TI-RADS guidelines.  2. No FNA or follow-up for other nodules above by ACR TI-RADS recommendations.    Earnestine sent a "[employer] message on 22 reporting that her thyroid nodules appeared larger to her when looking in the mirror. She had also started ot fel a sensation of them pressing down when she laid down on her back. She was advised to come into clinic for re-evaluation.    Today, Earnestine reports that her thyroid nodules have continued to feel and visually appear larger. She does not have any associated pain, breathing difficulties, or swallowing issues, but is bothered by these nodules. She feels \"overwhelmed\" in thinking about next steps, very concerned she has a cancer.    Earnestine is taking Synthroid 88 mcg daily. Consistent with dosing. Reports she takes medication with all her other medications and supplements.     TSH   Date Value Ref Range Status   2021 2.62 0.40 - 4.00 mU/L Final   2020 2.93 0.40 - 4.00 mU/L Final       Patient Active Problem List   Diagnosis     Hypothyroidism with goiter     Family history of colon cancer     Non-toxic multinodular goiter     Hip pain, left       Current Outpatient Medications   Medication Sig Dispense Refill     Calcium Citrate-Vitamin D (CALCIUM CITRATE+D3 PETITES) 200-250 MG-UNIT TABS Take 2 in the morning, 1 in the evening. 90 tablet 11     ferrous sulfate (FEROSUL) 325 (65 Fe) MG tablet        " "levothyroxine (SYNTHROID/LEVOTHROID) 88 MCG tablet Take 1 tablet (88 mcg) by mouth daily 90 tablet 3     vitamin C (ASCORBIC ACID) 100 MG tablet          Allergies   Allergen Reactions     Penicillins      Breathing problems        EXAM  /73   Pulse 85   Temp 97.7  F (36.5  C)   Ht 1.702 m (5' 7.01\")   Wt 69 kg (152 lb 0.6 oz)   SpO2 99%   BMI 23.81 kg/m    Gen: Alert, pleasant, worried  Neck: visible soft tissue mass midline at base of neck. Palpable soft tissue mass midline, no tenderness, no overlying erythema  COR: S1,S2, no murmur  Lungs: CTA bilaterally, no rhonchi, wheezes or rales  Neuro: DTR 2/4 in all extremities  Ext: no edema      Assessment:  (E04.1) Thyroid nodule  (primary encounter diagnosis)  Comment: known thyroid nodules, pt concerned nodules are enlarging, concern for cancer, currently no pain or difficulty swallowing  Plan: US Head Neck Soft Tissue, TSH with free T4         reflex, Adult Endocrinology  Referral        Referred to endocrinology for further evaluation and treatment. Order placed for head/neck US. Labs pending.     24 minutes spend on the date of this encounter doing chart review, history and exam, documentation and further activities as noted above.         Trish Aguilar MD  Internal Medicine/Pediatrics      I, Areli Walters, am serving as a scribe to document services personally performed by Dr. Trish Aguilar, based on data collection and the provider's statements to me. Dr. Aguilar has reviewed, edited, and approved the above note.     "

## 2022-04-22 ENCOUNTER — ANCILLARY PROCEDURE (OUTPATIENT)
Dept: ULTRASOUND IMAGING | Facility: CLINIC | Age: 57
End: 2022-04-22
Attending: INTERNAL MEDICINE
Payer: COMMERCIAL

## 2022-04-22 DIAGNOSIS — E04.1 THYROID NODULE: ICD-10-CM

## 2022-04-22 PROCEDURE — 76536 US EXAM OF HEAD AND NECK: CPT | Performed by: RADIOLOGY

## 2022-04-27 NOTE — TELEPHONE ENCOUNTER
To schedulers: Please schedule  for lst available Dr LERMA  biopsy clinic [facility] RBX 1 hour space or FLORENTIN GUTIERREZ 1 hour continuous space.    Palmira Archer MD  Endocrine triage

## 2022-04-27 NOTE — TELEPHONE ENCOUNTER
"Called pt, Left message on machine to call back.  Dr. Monzon has a thyroid bx open on 6/1/22.    Schedulers, if pt calls back, please, schedule this appt.  There is a hold, with pt's name on the 60\" appt.  Thank you.  Alice ARANDA LPN  [facility] Adult Endocrine Nursing Team      "

## 2022-04-29 ENCOUNTER — MYC MEDICAL ADVICE (OUTPATIENT)
Dept: FAMILY MEDICINE | Facility: CLINIC | Age: 57
End: 2022-04-29
Payer: COMMERCIAL

## 2022-05-02 NOTE — TELEPHONE ENCOUNTER
RECORDS RECEIVED FROM: internal    DATE RECEIVED: 6.1.22    NOTES (FOR ALL VISITS) STATUS DETAILS   OFFICE NOTES from referring provider internal   Trish Aguilar   OFFICE NOTES from other specialist     ED NOTES     OPERATIVE REPORT  (thyroid, pituitary, adrenal, parathyroid)     MEDICATION LIST internal     IMAGING      DEXASCAN     MRI (BRAIN)     XR (Chest)     CT (HEAD/NECK/CHEST/ABDOMEN)     NUCLEAR      ULTRASOUND (HEAD/NECK) internal  4.22.22, 8/23/21,    LABS     DIABETES: HBGA1C, CREATININE, FASTING LIPIDS, MICROALBUMIN URINE, POTASSIUM, TSH, T4    THYROID: TSH, T4, CBC, THYRODLONULIN, TOTAL T3, FREE T4, CALCITONIN, CEA internal  TSH/T4- 4.20.22  Cbc- 10.18.21  HBGA1C-10.18.21

## 2022-05-19 ENCOUNTER — MYC MEDICAL ADVICE (OUTPATIENT)
Dept: FAMILY MEDICINE | Facility: CLINIC | Age: 57
End: 2022-05-19
Payer: COMMERCIAL

## 2022-05-19 DIAGNOSIS — E04.9 HYPOTHYROIDISM WITH GOITER: Primary | ICD-10-CM

## 2022-05-19 DIAGNOSIS — E03.9 HYPOTHYROIDISM WITH GOITER: Primary | ICD-10-CM

## 2022-05-19 RX ORDER — LEVOTHYROXINE SODIUM 88 UG/1
88 TABLET ORAL DAILY
Qty: 5 TABLET | Refills: 0 | Status: SHIPPED | OUTPATIENT
Start: 2022-05-19 | End: 2022-09-09

## 2022-05-19 NOTE — TELEPHONE ENCOUNTER
Sending short 5 day fill of levothyroxine prescription at pt request; pt left medication at home and [place]. Explained that pt may have to pay out of pocket or should notify pharmacy that this is for travel and to contact her insurance provider to see if they will cover it.    Cayden STUART, RN  05/19/22 2:54 PM

## 2022-05-26 ASSESSMENT — ENCOUNTER SYMPTOMS
NECK PAIN: 0
BACK PAIN: 0
MUSCLE WEAKNESS: 0
MYALGIAS: 0
STIFFNESS: 1
MUSCLE CRAMPS: 0
ARTHRALGIAS: 0
JOINT SWELLING: 0

## 2022-05-27 ENCOUNTER — IMMUNIZATION (OUTPATIENT)
Dept: NURSING | Facility: CLINIC | Age: 57
End: 2022-05-27
Payer: COMMERCIAL

## 2022-05-27 PROCEDURE — 0064A COVID-19,PF,MODERNA (18+ YRS BOOSTER .25ML): CPT

## 2022-05-27 PROCEDURE — 91306 COVID-19,PF,MODERNA (18+ YRS BOOSTER .25ML): CPT

## 2022-05-31 NOTE — PROGRESS NOTES
Thyroid nodule  Stanton Encarnacion, SHARI      Earnestine Mcpherson is a 56 year old female who is being evaluated via a billable video visit.        [facility]    Consulting provider: Trish Aguilar MD  [address]  [place] [address]    Reason for consultation: Thyroid nodule    HPI:   Earnestine Mcpherson is a 56 year old female coming in in regards to    Patient concern: thyroid nodule      Thyroid medications: 88 mcg    Symptoms of hypo- hyperthyroidism:      Symptoms of hypo- and hyperthyroidism:  Weight change gain, no lossing; heat or cold intolerance no; abnormal bowel movements noanxiety  Or depression no; fatigue no; heart racing no; tremors no;    Symptoms in the anterior neck: dysphagia no; dysphonia no; pain no;    Exposure to radiation: no  FH of thyroid Cancer: no        Current Problem List:   Patient Active Problem List   Diagnosis     Hypothyroidism with goiter     Family history of colon cancer     Non-toxic multinodular goiter     Hip pain, left         Assessment:  History of hypothyroidism with multinodular goiter with large cyst.    Patient is experienced some mild globus feeling, no risk factors.  Due to a solid component of the cyst recommended fine-needle aspiration.      Plan:   FNA left thyroid cyst    If benign recommend repeat thyroid ultrasound is a 1 year follow-up from prior.    Continue levothyroxine at current dose  Alexa Monzon MD  Endocrinology and Diabetes  Telephone contact:  [facility] [phone]  [facility] [phone]              Past Medical and Past Surgical History:  Past Medical History:   Diagnosis Date     Thyroid disease        Past Surgical History:   Procedure Laterality Date      SECTION        SECTION       COLONOSCOPY  2016    due every 5 yr     COLONOSCOPY N/A 7/15/2021    Procedure: COLONOSCOPY, WITH POLYPECTOMY;  Surgeon: Ramila Gay  "MD;  Location: [facility] OR     FINGER SURGERY Right 1966    right hand ring finger       Medications:   Current Outpatient Medications   Medication Sig Dispense Refill     Calcium Citrate-Vitamin D (CALCIUM CITRATE+D3 PETITES) 200-250 MG-UNIT TABS Take 2 in the morning, 1 in the evening. 90 tablet 11     ferrous sulfate (FEROSUL) 325 (65 Fe) MG tablet        levothyroxine (SYNTHROID/LEVOTHROID) 88 MCG tablet Take 1 tablet (88 mcg) by mouth daily for 5 days 5 tablet 0     levothyroxine (SYNTHROID/LEVOTHROID) 88 MCG tablet Take 1 tablet (88 mcg) by mouth daily 90 tablet 3     vitamin C (ASCORBIC ACID) 100 MG tablet          Allergies:   Allergies   Allergen Reactions     Penicillins      Breathing problems       Social History     Tobacco Use     Smoking status: Never Smoker     Smokeless tobacco: Never Used   Substance Use Topics     Alcohol use: Yes     Comment: 7 drinks a week       Family History   Problem Relation Age of Onset     Hypertension Mother      Arthritis Mother      Glaucoma Mother      Macular Degeneration Mother      Diabetes Father 80        thin habitus     Hypothyroidism Sister      Cancer Sister         sublingual cancer (salivary?), surgery, no chemo     Hypothyroidism Sister      Colon Polyps Brother      Diabetes Brother 40        thin habitus     Glaucoma Maternal Grandmother      Colon Cancer Paternal Grandfather 65     Crohn's Disease Daughter 21     Liver Cancer Paternal Aunt      Colon Cancer Other 50        paternal side        Physical Examination:  Blood pressure 101/66, pulse 70, temperature 97.5  F (36.4  C), temperature source Oral, height 1.702 m (5' 7\"), weight 67.4 kg (148 lb 9.6 oz), not currently breastfeeding.  Body mass index is 23.27 kg/m .    Wt Readings from Last 4 Encounters:  06/01/22 : 67.4 kg (148 lb 9.6 oz)  04/20/22 : 69 kg (152 lb 0.6 oz)  12/23/21 : 70.4 kg (155 lb 1.6 oz)  08/13/21 : 69.6 kg (153 lb 8 oz)    General: Well appearing woman in no distress, up and go " quick  Eyes: EOMI. Sclerae and conjunctivae are clear.   HENT: fullness of neck left to midline upon inspection, thyroid is notable for large soft nodule in left lobe, non tender  Lymphatic: No cervical or supraclavicular lymphadenopathy.     Labs and Studies:   Lab Results   Component Value Date    HGB 13.3 10/18/2021    TSH 2.83 04/20/2022    TSH 2.62 08/13/2021    TSH 2.93 06/08/2020    TSH 6.16 (H) 02/24/2020    TSH 5.81 (H) 11/01/2019    TSH 7.43 (H) 09/18/2019         Results for orders placed in visit on 08/23/21    US Thyroid    Narrative  US THYROID 8/23/2021 8:06 AM    COMPARISON: None    HISTORY: soft tissue mass at the base of the neck, midline. Hx of  thyroid disease, thyroid tissue vs lipoma; Neck mass    FINDINGS:  Thyroid parenchyma: heterogenous  The right lobe of the thyroid measures: 4.8 x 1.6 x 2.0 cm  The left lobe of the thyroid measures: 7.9 x 4.1 x 4.0 cm  The thyroid isthmus measures: 0.4 cm    Nodule 1:  Lobe: Right  Location: Mid  Size: 1.1 x 0.6 x 0.6 cm  Composition: Solid or almost completely solid (2 points)  Echogenicity: Hyperechoic or isoechoic (1 point)  Shape: Wider than tall (0 points)  Margin: Ill-defined (0 points)  Echogenic Foci: None or large comet tail artifact (0 points)  Stability: Not previously imaged  TIRADS: TR3 (3 points)    Nodule 2:  Lobe: Right  Location: Upper  Size: 0.7 x 0.5 x 0.3 cm  Composition: Solid or almost completely solid (2 points)  Echogenicity: Hyperechoic or isoechoic (1 point)  Shape: Wider than tall (0 points)  Margin: Smooth (0 points)  Echogenic Foci: None or large comet tail artifact (0 points)  Stability: Not previously imaged  TIRADS: TR4 (4-6 points)    Nodule 3:  Lobe: Left  Location: Upper  Size: 1.9 x 1.7 x 1.6 cm  Composition: Mixed cystic and solid (1 point)  Echogenicity: Hyperechoic or isoechoic (1 point)  Shape: Wider than tall (0 points)  Margin: Smooth (0 points)  Echogenic Foci: Macro-calcifications (1 point)  Stability: Not  previously imaged  TIRADS: TR2 (1-2 points) Not suspicious    Nodule 4:  Lobe: Left  Location: Upper  Size: 0.7 x 0.8 x 0.5 cm  Composition: Solid or almost completely solid (2 points)  Echogenicity: Hypoechoic (2 points)  Shape: Wider than tall (0 points)  Margin: Smooth (0 points)  Echogenic Foci: None or large comet tail artifact (0 points)  Stability: Not previously imaged  TIRADS: TR4 (4-6 points)    Nodule 5:  Lobe: Left  Location: Mid  Size: 5.0 x 4.6 x 3.7 cm  Composition: Mixed cystic and solid (1 point)  Echogenicity: Hyperechoic or isoechoic (1 point)  Shape: Wider than tall (0 points)  Margin: Smooth (0 points)  Echogenic Foci: None or large comet tail artifact (0 points)  Stability: Not previously imaged  TIRADS: TR2 (1-2 points) Not suspicious    Nodule 6:  Lobe: Left  Location: Upper  Size: 0.8 x 0.7 x 0.5 cm  Composition: Solid or almost completely solid (2 points)  Echogenicity: Hypoechoic (2 points)  Shape: Wider than tall (0 points)  Margin: Smooth (0 points)  Echogenic Foci: None or large comet tail artifact (0 points)  Stability: Not previously imaged  TIRADS: TR4 (4-6 points)    Impression  Impression:    1. There are 2 small hypoechoic thyroid nodules in the left upper lobe  corresponding to nodules #4 and #6 in the findings above. Recommend  follow-up in one year by ACR TI-RADS guidelines.  2. No FNA or follow-up for other nodules above by ACR TI-RADS  recommendations.    ACR TI-RADS recommendations  TR2 (2 points) & TR1 (0 points) -No FNA or follow-up  TR3 (3 points) - FNA if ? 2.5cm, follow-up if 1.5 -2.4 cm in 1, 3 and  5 years  TR4 (4-6 points) - FNA if ? 1.5cm, follow-up if 1 -1.4 cm in 1, 2, 3  and 5 years  TR5 (?7 points) - FNA if ? 1cm, follow-up if 0.5 -0.9 cm every year  for 5 years    I have personally reviewed the examination and initial interpretation  and I agree with the findings.    ROBERT GUPTA MD      SYSTEM ID:  [id number]  ;  Results for orders placed in visit on  10/18/19    Dexa hip/pelvis/spine*    Narrative  [facility]  [address] [place] [id number]  Phone: [phone]   Fax: [phone]      Patient name:   Earnestine Mcpherson  Patient demographics:  54 year old White Female  History:  Evaluation of Bone Density and Post Menopausal, history of fracture; family history osteoporosis; family history of back fracture  Current treatments:  Thyroid Medications  Scan:    Elastera      Conclusions:  The most negative and valid T-score of -1.7 at the level of the lumbar spine, right femoral neck and right total hip corresponds with low bone density according to WHO criteria for postmenopausal females and men age 50 and over. The risk of osteoporotic fracture increases approximately 2-fold for each 1.0 SD decrease in T-score.    Low bone density is not the only risk factor for fracture; consider factors such as patient's age, fall risk, injury risk, previous osteoporotic fracture, family history of osteoporosis, etc.  People with an elevated risk of fracture should be regularly evaluated for low bone mineral density.  For patients eligible for Medicare, routine testing is allowed once every 2 years. Testing frequency can be increased for patients on corticosteroids. Clinical correlation is recommended.    Comparison Exams:  No previous exam for comparison.    Please refer to BMD values in PACS.    Bone densitometry cannot rule out secondary causes of bone loss. Therefore, further metabolic testing to look for secondary causes of low BMD should be performed if indicated. Clinical correlation is recommended    Recommend repeat DXA in 2 years.    Feel free to contact DXA services if you have any questions or comments.  Thank you for the opportunity to be of service to you and your patient.      Principal result :  Palmira Archer MD, [facility]  Division of Endocrinology and  Diabetes  Department of Medicine    Technical comments:  Satisfactory.    References:  Ref. 1. WHO categories:  T-score > -1.0  = normal             .  T-score -1.0 to -2.5 = low bone density  T-score < -2.5 = osteoporosis        .    Ref. 2. 2015 ISCD official position statements:  www.iscd.org.    Ref. 3.  (LSC = least significant change)  AP spine =  0.032 g/cm2  (6.26.2007)  Left hip = 0.029 g/cm2  (6.15.2007)  Right hip = 0.018 g/cm2  (6.15.2007)  Left mid radius = 0.043 g/cm2  (6.26.2007)  Lateral spine region = pending  Total body = pending  According to the ISCD position statements, total hip rather than femoral neck regions are to be compared because larger areas give better precision.    Ref. 4.  By definition, osteoporosis may be diagnosed in the presence or with the history of a low trauma or fragility fracture.  Fragility and low trauma fracture is defined as a fracture resulting from the force of a fall from a standing height or less or a bone that breaks under conditions that would not cause a normal bone to break.    Ref. 5. NOF Physician's Guideline Website address:  www.nof.org.      Answers for HPI/ROS submitted by the patient on 5/26/2022  General Symptoms: No  Skin Symptoms: No  HENT Symptoms: No  EYE SYMPTOMS: No  HEART SYMPTOMS: No  LUNG SYMPTOMS: No  INTESTINAL SYMPTOMS: No  URINARY SYMPTOMS: No  GYNECOLOGIC SYMPTOMS: No  BREAST SYMPTOMS: No  SKELETAL SYMPTOMS: Yes  BLOOD SYMPTOMS: No  NERVOUS SYSTEM SYMPTOMS: No  MENTAL HEALTH SYMPTOMS: No  Back pain: No  Muscle aches: No  Neck pain: No  Swollen joints: No  Joint pain: No  Bone pain: No  Muscle cramps: No  Muscle weakness: No  Joint stiffness: Yes  Bone fracture: No

## 2022-06-01 ENCOUNTER — OFFICE VISIT (OUTPATIENT)
Dept: ENDOCRINOLOGY | Facility: CLINIC | Age: 57
End: 2022-06-01
Attending: INTERNAL MEDICINE
Payer: COMMERCIAL

## 2022-06-01 ENCOUNTER — PRE VISIT (OUTPATIENT)
Dept: ENDOCRINOLOGY | Facility: CLINIC | Age: 57
End: 2022-06-01

## 2022-06-01 VITALS
WEIGHT: 148.6 LBS | HEIGHT: 67 IN | TEMPERATURE: 97.5 F | SYSTOLIC BLOOD PRESSURE: 101 MMHG | HEART RATE: 70 BPM | BODY MASS INDEX: 23.32 KG/M2 | DIASTOLIC BLOOD PRESSURE: 66 MMHG

## 2022-06-01 DIAGNOSIS — E04.2 NON-TOXIC MULTINODULAR GOITER: Primary | ICD-10-CM

## 2022-06-01 DIAGNOSIS — E04.1 THYROID NODULE: ICD-10-CM

## 2022-06-01 PROCEDURE — 99203 OFFICE O/P NEW LOW 30 MIN: CPT | Mod: 95 | Performed by: INTERNAL MEDICINE

## 2022-06-01 PROCEDURE — 88173 CYTOPATH EVAL FNA REPORT: CPT | Mod: 26 | Performed by: PATHOLOGY

## 2022-06-01 PROCEDURE — 88173 CYTOPATH EVAL FNA REPORT: CPT | Mod: TC | Performed by: INTERNAL MEDICINE

## 2022-06-01 ASSESSMENT — PAIN SCALES - GENERAL: PAINLEVEL: MILD PAIN (3)

## 2022-06-01 NOTE — PROCEDURES
.[email or web address] guided FNAB protocol  Indication: 5cm enlerging cyst with solid compoent    Following informed consent, pause for the cause and the sterile preparation, FNA biopsy was performed using 25 gauge needles and ultrasound guidance for needle placement. Total of 5 passes were made . ()ml of clear cystic content were withdrawn. Samples were submitted for cytology, 1/2 air dried and 1/2 in 95% ETOH and the needle wash without complications. Ater care instructions were provided.     Impression: uncomplicated fine needle aspiration biopsy of left thyroid cystic nodule under ultrasound

## 2022-06-01 NOTE — LETTER
6/1/2022       RE: Earnestine Mcpherson  [address]  [facility] [address]     Dear Colleague,    Thank you for referring your patient, Earnestine Mpcherson, to the [facility] [place] [facility]. Please see a copy of my visit note below.    Thyroid nodule  Satnton Encarnacion, SHARI      Earnestine Mcpherson is a 56 year old female who is being evaluated via a billable video visit.        Endocrinology and Diabetes Clinic    Consulting provider: Trish Aguilar MD  [address] [place] [address]    Reason for consultation: Thyroid nodule    HPI:   Earnestine Mcpherson is a 56 year old female coming in in regards to    Patient concern: thyroid nodule      Thyroid medications: 88 mcg    Symptoms of hypo- hyperthyroidism:      Symptoms of hypo- and hyperthyroidism:  Weight change gain, no lossing; heat or cold intolerance no; abnormal bowel movements noanxiety  Or depression no; fatigue no; heart racing no; tremors no;    Symptoms in the anterior neck: dysphagia no; dysphonia no; pain no;    Exposure to radiation: no  FH of thyroid Cancer: no        Current Problem List:   Patient Active Problem List   Diagnosis     Hypothyroidism with goiter     Family history of colon cancer     Non-toxic multinodular goiter     Hip pain, left         Assessment:  History of hypothyroidism with multinodular goiter with large cyst.    Patient is experienced some mild globus feeling, no risk factors.  Due to a solid component of the cyst recommended fine-needle aspiration.      Plan:   FNA left thyroid cyst    If benign recommend repeat thyroid ultrasound is a 1 year follow-up from prior.    Continue levothyroxine at current dose  Alexa Monzon MD  Endocrinology and Diabetes  Telephone contact:  [facility] [place] [phone]  [facility] [phone]              Past Medical and Past  "Surgical History:  Past Medical History:   Diagnosis Date     Thyroid disease        Past Surgical History:   Procedure Laterality Date      SECTION        SECTION       COLONOSCOPY  2016    due every 5 yr     COLONOSCOPY N/A 7/15/2021    Procedure: COLONOSCOPY, WITH POLYPECTOMY;  Surgeon: Ramila Gay MD;  Location: [facility] OR     FINGER SURGERY Right 1966    right hand ring finger       Medications:   Current Outpatient Medications   Medication Sig Dispense Refill     Calcium Citrate-Vitamin D (CALCIUM CITRATE+D3 PETITES) 200-250 MG-UNIT TABS Take 2 in the morning, 1 in the evening. 90 tablet 11     ferrous sulfate (FEROSUL) 325 (65 Fe) MG tablet        levothyroxine (SYNTHROID/LEVOTHROID) 88 MCG tablet Take 1 tablet (88 mcg) by mouth daily for 5 days 5 tablet 0     levothyroxine (SYNTHROID/LEVOTHROID) 88 MCG tablet Take 1 tablet (88 mcg) by mouth daily 90 tablet 3     vitamin C (ASCORBIC ACID) 100 MG tablet          Allergies:   Allergies   Allergen Reactions     Penicillins      Breathing problems       Social History     Tobacco Use     Smoking status: Never Smoker     Smokeless tobacco: Never Used   Substance Use Topics     Alcohol use: Yes     Comment: 7 drinks a week       Family History   Problem Relation Age of Onset     Hypertension Mother      Arthritis Mother      Glaucoma Mother      Macular Degeneration Mother      Diabetes Father 80        thin habitus     Hypothyroidism Sister      Cancer Sister         sublingual cancer (salivary?), surgery, no chemo     Hypothyroidism Sister      Colon Polyps Brother      Diabetes Brother 40        thin habitus     Glaucoma Maternal Grandmother      Colon Cancer Paternal Grandfather 65     Crohn's Disease Daughter 21     Liver Cancer Paternal Aunt      Colon Cancer Other 50        paternal side        Physical Examination:  Blood pressure 101/66, pulse 70, temperature 97.5  F (36.4  C), temperature source Oral, height 1.702 m (5' 7\"), " weight 67.4 kg (148 lb 9.6 oz), not currently breastfeeding.  Body mass index is 23.27 kg/m .    Wt Readings from Last 4 Encounters:  06/01/22 : 67.4 kg (148 lb 9.6 oz)  04/20/22 : 69 kg (152 lb 0.6 oz)  12/23/21 : 70.4 kg (155 lb 1.6 oz)  08/13/21 : 69.6 kg (153 lb 8 oz)    General: Well appearing woman in no distress, up and go quick  Eyes: EOMI. Sclerae and conjunctivae are clear.   HENT: fullness of neck left to midline upon inspection, thyroid is notable for large soft nodule in left lobe, non tender  Lymphatic: No cervical or supraclavicular lymphadenopathy.     Labs and Studies:   Lab Results   Component Value Date    HGB 13.3 10/18/2021    TSH 2.83 04/20/2022    TSH 2.62 08/13/2021    TSH 2.93 06/08/2020    TSH 6.16 (H) 02/24/2020    TSH 5.81 (H) 11/01/2019    TSH 7.43 (H) 09/18/2019         Results for orders placed in visit on 08/23/21    US Thyroid    Narrative  US THYROID 8/23/2021 8:06 AM    COMPARISON: None    HISTORY: soft tissue mass at the base of the neck, midline. Hx of  thyroid disease, thyroid tissue vs lipoma; Neck mass    FINDINGS:  Thyroid parenchyma: heterogenous  The right lobe of the thyroid measures: 4.8 x 1.6 x 2.0 cm  The left lobe of the thyroid measures: 7.9 x 4.1 x 4.0 cm  The thyroid isthmus measures: 0.4 cm    Nodule 1:  Lobe: Right  Location: Mid  Size: 1.1 x 0.6 x 0.6 cm  Composition: Solid or almost completely solid (2 points)  Echogenicity: Hyperechoic or isoechoic (1 point)  Shape: Wider than tall (0 points)  Margin: Ill-defined (0 points)  Echogenic Foci: None or large comet tail artifact (0 points)  Stability: Not previously imaged  TIRADS: TR3 (3 points)    Nodule 2:  Lobe: Right  Location: Upper  Size: 0.7 x 0.5 x 0.3 cm  Composition: Solid or almost completely solid (2 points)  Echogenicity: Hyperechoic or isoechoic (1 point)  Shape: Wider than tall (0 points)  Margin: Smooth (0 points)  Echogenic Foci: None or large comet tail artifact (0 points)  Stability: Not  previously imaged  TIRADS: TR4 (4-6 points)    Nodule 3:  Lobe: Left  Location: Upper  Size: 1.9 x 1.7 x 1.6 cm  Composition: Mixed cystic and solid (1 point)  Echogenicity: Hyperechoic or isoechoic (1 point)  Shape: Wider than tall (0 points)  Margin: Smooth (0 points)  Echogenic Foci: Macro-calcifications (1 point)  Stability: Not previously imaged  TIRADS: TR2 (1-2 points) Not suspicious    Nodule 4:  Lobe: Left  Location: Upper  Size: 0.7 x 0.8 x 0.5 cm  Composition: Solid or almost completely solid (2 points)  Echogenicity: Hypoechoic (2 points)  Shape: Wider than tall (0 points)  Margin: Smooth (0 points)  Echogenic Foci: None or large comet tail artifact (0 points)  Stability: Not previously imaged  TIRADS: TR4 (4-6 points)    Nodule 5:  Lobe: Left  Location: Mid  Size: 5.0 x 4.6 x 3.7 cm  Composition: Mixed cystic and solid (1 point)  Echogenicity: Hyperechoic or isoechoic (1 point)  Shape: Wider than tall (0 points)  Margin: Smooth (0 points)  Echogenic Foci: None or large comet tail artifact (0 points)  Stability: Not previously imaged  TIRADS: TR2 (1-2 points) Not suspicious    Nodule 6:  Lobe: Left  Location: Upper  Size: 0.8 x 0.7 x 0.5 cm  Composition: Solid or almost completely solid (2 points)  Echogenicity: Hypoechoic (2 points)  Shape: Wider than tall (0 points)  Margin: Smooth (0 points)  Echogenic Foci: None or large comet tail artifact (0 points)  Stability: Not previously imaged  TIRADS: TR4 (4-6 points)    Impression  Impression:    1. There are 2 small hypoechoic thyroid nodules in the left upper lobe  corresponding to nodules #4 and #6 in the findings above. Recommend  follow-up in one year by ACR TI-RADS guidelines.  2. No FNA or follow-up for other nodules above by ACR TI-RADS  recommendations.    ACR TI-RADS recommendations  TR2 (2 points) & TR1 (0 points) -No FNA or follow-up  TR3 (3 points) - FNA if ? 2.5cm, follow-up if 1.5 -2.4 cm in 1, 3 and  5 years  TR4 (4-6 points) - FNA if ?  1.5cm, follow-up if 1 -1.4 cm in 1, 2, 3  and 5 years  TR5 (?7 points) - FNA if ? 1cm, follow-up if 0.5 -0.9 cm every year  for 5 years    I have personally reviewed the examination and initial interpretation  and I agree with the findings.    ROBERT GUPTA MD      SYSTEM ID:  [id number]  ;  Results for orders placed in visit on 10/18/19    Dexa hip/pelvis/spine*    Narrative  [facility]  [address] [place] [id number]  Phone: [phone]   Fax: [phone]      Patient name:   Earnestine Mcpherson  Patient demographics:  54 year old White Female  History:  Evaluation of Bone Density and Post Menopausal, history of fracture; family history osteoporosis; family history of back fracture  Current treatments:  Thyroid Medications  Scan:    ProxToMeigy      Conclusions:  The most negative and valid T-score of -1.7 at the level of the lumbar spine, right femoral neck and right total hip corresponds with low bone density according to WHO criteria for postmenopausal females and men age 50 and over. The risk of osteoporotic fracture increases approximately 2-fold for each 1.0 SD decrease in T-score.    Low bone density is not the only risk factor for fracture; consider factors such as patient's age, fall risk, injury risk, previous osteoporotic fracture, family history of osteoporosis, etc.  People with an elevated risk of fracture should be regularly evaluated for low bone mineral density.  For patients eligible for Medicare, routine testing is allowed once every 2 years. Testing frequency can be increased for patients on corticosteroids. Clinical correlation is recommended.    Comparison Exams:  No previous exam for comparison.    Please refer to BMD values in PACS.    Bone densitometry cannot rule out secondary causes of bone loss. Therefore, further metabolic testing to look for secondary causes of low BMD should be performed if  indicated. Clinical correlation is recommended    Recommend repeat DXA in 2 years.    Feel free to contact DXA services if you have any questions or comments.  Thank you for the opportunity to be of service to you and your patient.      Principal result :  Plamira Archer MD, [facility]  Division of Endocrinology and Diabetes  Department of Medicine    Technical comments:  Satisfactory.    References:  Ref. 1. WHO categories:  T-score > -1.0  = normal             .  T-score -1.0 to -2.5 = low bone density  T-score < -2.5 = osteoporosis        .    Ref. 2. 2015 ISCD official position statements:  www.iscd.org.    Ref. 3.  (LSC = least significant change)  AP spine =  0.032 g/cm2  (6.26.2007)  Left hip = 0.029 g/cm2  (6.15.2007)  Right hip = 0.018 g/cm2  (6.15.2007)  Left mid radius = 0.043 g/cm2  (6.26.2007)  Lateral spine region = pending  Total body = pending  According to the ISCD position statements, total hip rather than femoral neck regions are to be compared because larger areas give better precision.    Ref. 4.  By definition, osteoporosis may be diagnosed in the presence or with the history of a low trauma or fragility fracture.  Fragility and low trauma fracture is defined as a fracture resulting from the force of a fall from a standing height or less or a bone that breaks under conditions that would not cause a normal bone to break.    Ref. 5. NOF Physician's Guideline Website address:  www.nof.org.      Answers for HPI/ROS submitted by the patient on 5/26/2022  General Symptoms: No  Skin Symptoms: No  HENT Symptoms: No  EYE SYMPTOMS: No  HEART SYMPTOMS: No  LUNG SYMPTOMS: No  INTESTINAL SYMPTOMS: No  URINARY SYMPTOMS: No  GYNECOLOGIC SYMPTOMS: No  BREAST SYMPTOMS: No  SKELETAL SYMPTOMS: Yes  BLOOD SYMPTOMS: No  NERVOUS SYSTEM SYMPTOMS: No  MENTAL HEALTH SYMPTOMS: No  Back pain: No  Muscle aches: No  Neck pain: No  Swollen joints: No  Joint pain: No  Bone pain: No  Muscle cramps: No  Muscle  weakness: No  Joint stiffness: Yes  Bone fracture: No

## 2022-06-02 LAB
PATH REPORT.COMMENTS IMP SPEC: NORMAL
PATH REPORT.FINAL DX SPEC: NORMAL
PATH REPORT.GROSS SPEC: NORMAL
PATH REPORT.RELEVANT HX SPEC: NORMAL

## 2022-06-10 ENCOUNTER — THERAPY VISIT (OUTPATIENT)
Dept: PHYSICAL THERAPY | Facility: CLINIC | Age: 57
End: 2022-06-10
Payer: COMMERCIAL

## 2022-06-10 DIAGNOSIS — M25.552 HIP PAIN, LEFT: Primary | ICD-10-CM

## 2022-06-10 PROCEDURE — 97530 THERAPEUTIC ACTIVITIES: CPT | Mod: GP | Performed by: PHYSICAL THERAPIST

## 2022-06-27 ENCOUNTER — MYC MEDICAL ADVICE (OUTPATIENT)
Dept: FAMILY MEDICINE | Facility: CLINIC | Age: 57
End: 2022-06-27

## 2022-06-27 DIAGNOSIS — E03.9 HYPOTHYROIDISM WITH GOITER: Primary | ICD-10-CM

## 2022-06-27 DIAGNOSIS — E04.9 HYPOTHYROIDISM WITH GOITER: Primary | ICD-10-CM

## 2022-08-05 ENCOUNTER — THERAPY VISIT (OUTPATIENT)
Dept: PHYSICAL THERAPY | Facility: CLINIC | Age: 57
End: 2022-08-05
Payer: COMMERCIAL

## 2022-08-05 DIAGNOSIS — M25.552 HIP PAIN, LEFT: Primary | ICD-10-CM

## 2022-08-05 PROCEDURE — 97140 MANUAL THERAPY 1/> REGIONS: CPT | Mod: GP | Performed by: PHYSICAL THERAPIST

## 2022-08-05 PROCEDURE — 97110 THERAPEUTIC EXERCISES: CPT | Mod: GP | Performed by: PHYSICAL THERAPIST

## 2022-08-05 ASSESSMENT — ACTIVITIES OF DAILY LIVING (ADL)
GETTING_INTO_AND_OUT_OF_AN_AVERAGE_CAR: NO DIFFICULTY AT ALL
HOS_ADL_COUNT: 17
WALKING_UP_STEEP_HILLS: NO DIFFICULTY AT ALL
RECREATIONAL_ACTIVITIES: SLIGHT DIFFICULTY
GOING_DOWN_1_FLIGHT_OF_STAIRS: SLIGHT DIFFICULTY
HEAVY_WORK: NO DIFFICULTY AT ALL
GETTING_INTO_AND_OUT_OF_A_BATHTUB: SLIGHT DIFFICULTY
HOS_ADL_ITEM_SCORE_TOTAL: 59
WALKING_DOWN_STEEP_HILLS: SLIGHT DIFFICULTY
HOS_ADL_HIGHEST_POTENTIAL_SCORE: 68
ROLLING_OVER_IN_BED: NO DIFFICULTY AT ALL
SITTING_FOR_15_MINUTES: NO DIFFICULTY AT ALL
TWISTING/PIVOTING_ON_INVOLVED_LEG: SLIGHT DIFFICULTY
GOING_UP_1_FLIGHT_OF_STAIRS: NO DIFFICULTY AT ALL
STANDING_FOR_15_MINUTES: NO DIFFICULTY AT ALL
WALKING_15_MINUTES_OR_GREATER: SLIGHT DIFFICULTY
STEPPING_UP_AND_DOWN_CURBS: NO DIFFICULTY AT ALL
WALKING_INITIALLY: SLIGHT DIFFICULTY
LIGHT_TO_MODERATE_WORK: SLIGHT DIFFICULTY
HOS_ADL_SCORE(%): 86.76
DEEP_SQUATTING: NO DIFFICULTY AT ALL
HOW_WOULD_YOU_RATE_YOUR_CURRENT_LEVEL_OF_FUNCTION_DURING_YOUR_USUAL_ACTIVITIES_OF_DAILY_LIVING_FROM_0_TO_100_WITH_100_BEING_YOUR_LEVEL_OF_FUNCTION_PRIOR_TO_YOUR_HIP_PROBLEM_AND_0_BEING_THE_INABILITY_TO_PERFORM_ANY_OF_YOUR_USUAL_DAILY_ACTIVITIES?: 90
WALKING_APPROXIMATELY_10_MINUTES: SLIGHT DIFFICULTY
PUTTING_ON_SOCKS_AND_SHOES: NO DIFFICULTY AT ALL

## 2022-08-05 NOTE — PROGRESS NOTES
Subjective:  HPI  Physical Exam                    Objective:  System    Physical Exam    General     ROS    Assessment/Plan:    PROGRESS  REPORT    Progress reporting period is from 4/13/2022 to 8/5/2022.       SUBJECTIVE  Subjective changes noted by patient:  .  Subjective: Hip is about the same.  Does not feel pain with cycling.  Has been playing pickle ball.  Some discomfort after this but does not linger.  Now able to sleep on that side.  Walking still irritated towards the end.  Over the fall and winter will swim, play pickle ball and weight lifting 2 times a week.    Current pain level is 1/10  .     Previous pain level was   Initial Pain level: 2/10.   Changes in function:  Yes (See Goal flowsheet attached for changes in current functional level)  Adverse reaction to treatment or activity: None    OBJECTIVE  Changes noted in objective findings:  Yes,   Objective: Still has loss of hip IR and ER  Lateral hip pain with adductor stretch standing.  Felt best stretch with traction with belts with lateral hip traction.   Shown how to do this at home with TB in kneeling.  Will continue with this and get and xray with MD so establish baseline for loss of ROM compared to the right hip.     ASSESSMENT/PLAN  Updated problem list and treatment plan: Diagnosis 1:  Left hip pain  Pain -  manual therapy, self management, education and home program  Decreased ROM/flexibility - manual therapy, therapeutic exercise and home program  Decreased joint mobility - manual therapy, therapeutic exercise and home program  Decreased strength - therapeutic exercise, therapeutic activities and home program  Decreased function - therapeutic activities and home program  STG/LTGs have been met or progress has been made towards goals:  Yes (See Goal flow sheet completed today.)  Assessment of Progress: The patient's condition has potential to improve.  Self Management Plans:  Patient has been instructed in a home treatment  program.    Earnestine continues to require the following intervention to meet STG and LTG's:  Patient needs to continue to work on the home exercise program.      Recommendations:  Will discharge and patient to reach out with questions.     Please refer to the daily flowsheet for treatment today, total treatment time and time spent performing 1:1 timed codes.

## 2022-08-18 ENCOUNTER — TRANSFERRED RECORDS (OUTPATIENT)
Dept: SURGERY | Facility: CLINIC | Age: 57
End: 2022-08-18

## 2022-08-22 ENCOUNTER — TRANSFERRED RECORDS (OUTPATIENT)
Dept: SURGERY | Facility: CLINIC | Age: 57
End: 2022-08-22

## 2022-09-09 ENCOUNTER — OFFICE VISIT (OUTPATIENT)
Dept: SURGERY | Facility: CLINIC | Age: 57
End: 2022-09-09
Payer: COMMERCIAL

## 2022-09-09 ENCOUNTER — TELEPHONE (OUTPATIENT)
Dept: SURGERY | Facility: CLINIC | Age: 57
End: 2022-09-09

## 2022-09-09 VITALS
WEIGHT: 148 LBS | RESPIRATION RATE: 16 BRPM | HEIGHT: 67 IN | OXYGEN SATURATION: 95 % | BODY MASS INDEX: 23.23 KG/M2 | DIASTOLIC BLOOD PRESSURE: 74 MMHG | HEART RATE: 62 BPM | SYSTOLIC BLOOD PRESSURE: 126 MMHG

## 2022-09-09 DIAGNOSIS — E04.1 THYROID NODULE: Primary | ICD-10-CM

## 2022-09-09 PROCEDURE — 99204 OFFICE O/P NEW MOD 45 MIN: CPT | Performed by: SURGERY

## 2022-09-09 NOTE — PROGRESS NOTES
History of Present Illness  Earnestine Mcpherson is a 56 year old female who is referred from Dr. Renata Kamara for surgery consultation regarding a 5 cm left thyroid nodule.  Earnestine was first diagnosed with a thyroid nodule approximately 1 year ago when her primary care physician palpated it on exam.  She has since undergone 2 FNA biopsies.  The first was indeterminant.  This past month she had a repeat biopsy which was confirmed benign.  She has become increasingly aware of her thyroid nodule and can feel it when she swallows.  She does not have any shortness of breath neck pain, hoarseness, cough, or voice changes.      She denies fatigue, weight changes, heat/cold intolerance, bowel/skin changes or CVS symptoms.    She does not have a family history of thyroid cancer.  She denies a personal history of radiation exposure.    Earnestine has a diagnosis of hypothyroidism which is well managed on 88 mcg of levothyroxine daily.    Earnestine has no prior neck surgery.    Work up to date:  Imaging:  Thyroid Ultrasound:  US THYROID 4/22/2022 1:28 PM     COMPARISON: Ultrasound 8/23/2021     HISTORY: hx of thyroid nodules, enlarging, discomfort; Thyroid nodule     FINDINGS:   Thyroid parenchyma: Heterogeneous. Hypervascular.  The right lobe of the thyroid measures: 5.4 x 1.9 x 1.7 cm  The left lobe of the thyroid measures: 7.8 x 4.4 x 4.1 cm  The thyroid isthmus measures: 0.4 cm     Right Lobe:  Nodule 1:  Location: Mid  Size: 1.1 x 0.6 x 0.6 cm  Composition: Solid or almost completely solid (2 points)  Echogenicity: Hyperechoic or isoechoic (1 point)  Shape: Wider than tall (0 points)  Margin: Smooth (0 points)  Echogenic Foci: None or large comet tail artifact (0 points)  Stability: No significant change in size  TIRADS: TR3 (3 points)      Nodule 2:  Location: Superior  Size: 0.6 x 0.6 x 0.5 cm  Composition: Solid or almost completely solid (2 points)  Echogenicity: Hyperechoic or isoechoic (1 point)  Shape: Wider than  tall (0 points)  Margin: Smooth (0 points)  Echogenic Foci: None or large comet tail artifact (0 points)  Stability: No significant change in size  TIRADS: TR3 (3 points)      Left Lobe:     Nodule 3:  Location: Superior  Size: 2.1 x 1.8 x 1.5 cm  Composition: Mixed cystic and solid (1 point)  Echogenicity: Hyperechoic or isoechoic (1 point)  Shape: Wider than tall (0 points)  Margin: Smooth (0 points)  Echogenic Foci: None or large comet tail artifact (0 points)  Stability: No significant change in size  TIRADS: TR2 (1-2 points) Not suspicious     Nodule 4:  Location: Superior  Size: 0.7 x 0.6 x 0.4 cm  Composition: Solid or almost completely solid (2 points)  Echogenicity: Hypoechoic (2 points)  Shape: Wider than tall (0 points)  Margin: Smooth (0 points)  Echogenic Foci: None or large comet tail artifact (0 points)  Stability: No significant change in size  TIRADS: TR4 (4-6 points)      Nodule 5:  Location: Mid  Size: 5.0 x 4.9 x 3.9 cm  Composition: Mixed cystic and solid (1 point)  Echogenicity: Hyperechoic or isoechoic (1 point)  Shape: Wider than tall (0 points)  Margin: Smooth (0 points)  Echogenic Foci: None or large comet tail artifact (0 points)  Stability: Enlarging, previously 5.0 x 4.6 x 3.7 cm  TIRADS: TR2 (1-2 points) Not suspicious     Nodule 6:  Location: Superior  Size: 0.7 x 0.6 x 0.5 cm  Composition: Solid or almost completely solid (2 points)  Echogenicity: Hyperechoic or isoechoic (1 point)  Shape: Wider than tall (0 points)  Margin: Smooth (0 points)  Echogenic Foci: None or large comet tail artifact (0 points)  Stability: No significant change in size  TIRADS: TR3 (3 points)                                                                       Impression:  Bilateral thyroid nodules as described above.    Lymph Node Mapping: Not performed.    Imaging was personally reviewed with Earnestine.     No results found for this or any previous visit (from the past 744 hour(s)).    FNA biopsy  results:    Labs:  TSH: 2.83    Past Medical History:  Past Medical History:   Diagnosis Date     Thyroid disease        Past Surgical History:  Past Surgical History:   Procedure Laterality Date      SECTION        SECTION       COLONOSCOPY  2016    due every 5 yr     COLONOSCOPY N/A 7/15/2021    Procedure: COLONOSCOPY, WITH POLYPECTOMY;  Surgeon: Ramila Gay MD;  Location: [facility] OR     FINGER SURGERY Right 1966    right hand ring finger        Social History:  Social History     Tobacco Use     Smoking status: Never Smoker     Smokeless tobacco: Never Used   Vaping Use     Vaping Use: Never used   Substance Use Topics     Alcohol use: Yes     Comment: 7 drinks a week     Drug use: Never     ROS:  The 10 point review of systems is negative other than noted in the HPI and/or below.    Physical Exam:  There were no vitals taken for this visit.  Well developed, well nourished female in no apparent distress.  HEENT:  Normocephalic, atraumatic.                   Eyes without exophthalmos.  Neck:   Normal appearance and suitable neck creases seen.              Range of motion is normal.              No neck masses on visual inspection.  Thyroid:  Left thyroid enlarged compared to right  Lymph:  No cervical adenopathy.  Respirations:  Unlabored.  Neurologic:  Alert.  Speech is clear.  Moves all extremities with good strength  Skin:  Warm and dry.  Psychologic:  Alert and appropriate range of emotions.      Assessment and Plan:      Earnestine has left thyroid nodules with a 5 cm dominant nodule. She is starting to have compressive symptoms and is interested in surgical removal.   I am recommending left thyroid lobectomy.  Earnestine is aware of the risks to the recurrent laryngeal nerve and to the parathyroid glands during surgery.  She is interested in proceeding with surgery sometime in the next several weeks.     Adrienne Desai MD  Please route to : Referring Provider    60 minutes total time  spent on the date of this encounter doing: chart review, review of test results, patient visit, physical exam, education, counseling, developing plan of care, and documenting.

## 2022-09-09 NOTE — TELEPHONE ENCOUNTER
Type of surgery: LEFT THYROID LOBECTOMY      Location of surgery: [facility]  Date and time of surgery: 10/19/2022 @ 8:45 AM   Surgeon: Adrienne Desai MD   Pre-Op Appt Date: PATIENT TO SCHEDULE   Post-Op Appt Date: PATIENT TO SCHEDULE     Packet sent out: Yes  Pre-cert/Authorization completed:  Not Applicable  Date: 9/9/2022      LEFT THYROID LOBECTOMY     GENERAL PT INST TO HAVE H&P WITH DR GARCIA 120 MIN REQ PA ASSIST [facility] NMS

## 2022-09-18 ENCOUNTER — HEALTH MAINTENANCE LETTER (OUTPATIENT)
Age: 57
End: 2022-09-18

## 2022-09-27 NOTE — PROGRESS NOTES
[address], [address]  [facility] [address]  Phone: [phone]  Fax: [phone]  Primary Provider: Trish Aguilar  Pre-op Performing Provider: TRISH AGUILAR    PREOPERATIVE EVALUATION:  Today's date: 9/30/2022    Earnestine Mcpherson is a 57 year old female who presents for a preoperative evaluation.    Surgical Information:  Surgery/Procedure: Thyroid Lobectomy  Surgery Location: [facility]  Surgeon: Adrienne Desai MD  Surgery Date: 10/19/2022  Time of Surgery: 8:45 AM  Where patient plans to recover: At home with family  Fax number for surgical facility: Note does not need to be faxed, will be available electronically in Epic.    Type of Anesthesia Anticipated: General    Assessment & Plan     The proposed surgical procedure is considered LOW risk.    (Z01.818) Preop general physical exam  (primary encounter diagnosis)  Comment: elective resection of benign enlarged (5cm) thyroid nodule  Plan: No contraindications, proceed as planned. Pt is instructed to hold any vitamins, herbs, supplements and NSAIDs 10 days prior to surgery. Surgeon will recheck TSH after surgery.       (M25.552) Hip pain, left  Comment: point tenderness over posterior hip, slightly limited ROM of the left hip, currently going to PT with mild improvement  Plan: Orthopedic  Referral        Refer to sports clinic for evaluation and treatment      Risks and Recommendations:  The patient has the following additional risks and recommendations for perioperative complications:   - No identified additional risk factors other than previously addressed    Medication Instructions:  Pt is instructed to hold any vitamins, herbs, supplements and NSAIDs 10 days prior to surgery. She may take any prescription medications with a small sip of water on the morning of the procedure.       RECOMMENDATION:  APPROVAL GIVEN to proceed with proposed procedure, without further  diagnostic evaluation.    Trish Aguilar MD  Internal Medicine/Pediatrics        Subjective     HPI related to upcoming procedure:   Earnestine has left thyroid nodules with a 5 cm dominant nodule which was noted on an US on 4/22/22. FNA showed benign pathology. She has begun developing compressive symptoms and is pursuing a left sided thyroid lobectomy with Dr. Desai.    Preop Questions 9/23/2022   1. Have you ever had a heart attack or stroke? No   2. Have you ever had surgery on your heart or blood vessels, such as a stent placement, a coronary artery bypass, or surgery on an artery in your head, neck, heart, or legs? No   3. Do you have chest pain with activity? No   4. Do you have a history of  heart failure? No   5. Do you currently have a cold, bronchitis or symptoms of other infection? No   6. Do you have a cough, shortness of breath, or wheezing? No   7. Do you or anyone in your family have previous history of blood clots? No   8. Do you or does anyone in your family have a serious bleeding problem such as prolonged bleeding following surgeries or cuts? No   9. Have you ever had problems with anemia or been told to take iron pills? YES - currently taking an iron supplement   10. Have you had any abnormal blood loss such as black, tarry or bloody stools, or abnormal vaginal bleeding? No   11. Have you ever had a blood transfusion? No   12. Are you willing to have a blood transfusion if it is medically needed before, during, or after your surgery? Yes   13. Have you or any of your relatives ever had problems with anesthesia? No   14. Do you have sleep apnea, excessive snoring or daytime drowsiness? No   15. Do you have any artifical heart valves or other implanted medical devices like a pacemaker, defibrillator, or continuous glucose monitor? No   16. Do you have artificial joints? No   17. Are you allergic to latex? No   18. Is there any chance that you may be pregnant? No       Health Care Directive:  Patient  "does not have a Health Care Directive or Living Will: Discussed advance care planning with patient; information given to patient to review.    Preoperative Review of :   reviewed - no record of controlled substances prescribed.    Status of Chronic Conditions:  See problem list for active medical problems.  Problems all longstanding and stable, except as noted/documented.  See ROS for pertinent symptoms related to these conditions.    Left hip pain  Earnestine has been seeing a PT for evaluation and treatment of left posterior hip pain. Making some improvement. Would like to see sports medicine doctor as PT is concerned she may have osteoarthritis of her hip. No injury. Feels \"stiff\" upon getting up from chair.     Review of Systems  CONSTITUTIONAL: NEGATIVE for fever, chills, change in weight, Recent COVID illness Se  INTEGUMENTARY/SKIN: NEGATIVE for worrisome rashes, moles or lesions  EYES: NEGATIVE for vision changes or irritation  ENT/MOUTH: NEGATIVE for ear, mouth and throat problems  RESP: NEGATIVE for significant cough or SOB  CV: NEGATIVE for chest pain, palpitations or peripheral edema  GI: NEGATIVE for nausea, abdominal pain, heartburn, or change in bowel habits  : NEGATIVE for frequency, dysuria, or hematuria  MUSCULOSKELETAL: NEGATIVE for significant arthralgias or myalgia  NEURO: NEGATIVE for weakness, dizziness or paresthesias  ENDOCRINE: NEGATIVE for temperature intolerance, skin/hair changes, thyroid nodule, euthymic, on levothyroxine  HEME: NEGATIVE for bleeding problems  PSYCHIATRIC: NEGATIVE for changes in mood or affect    Patient Active Problem List    Diagnosis Date Noted     Hip pain, left 04/13/2022     Priority: Medium     Non-toxic multinodular goiter 08/23/2021     Priority: Medium     Thyroid US 8/2021 with multiple benign nodules, follow up in 8/2022       Hypothyroidism with goiter 09/19/2019     Priority: Medium     Family history of colon cancer 09/19/2019     Priority: Medium    " "  Past Medical History:   Diagnosis Date     Thyroid disease      Past Surgical History:   Procedure Laterality Date      SECTION        SECTION       COLONOSCOPY  2016    due every 5 yr     COLONOSCOPY N/A 7/15/2021    Procedure: COLONOSCOPY, WITH POLYPECTOMY;  Surgeon: Ramila Gay MD;  Location: [facility] OR     FINGER SURGERY Right 1966    right hand ring finger     Current Outpatient Medications   Medication Sig Dispense Refill     Calcium Citrate-Vitamin D (CALCIUM CITRATE+D3 PETITES) 200-250 MG-UNIT TABS Take 2 in the morning, 1 in the evening. 90 tablet 11     ferrous sulfate (FEROSUL) 325 (65 Fe) MG tablet        levothyroxine (SYNTHROID/LEVOTHROID) 88 MCG tablet Take 1 tablet (88 mcg) by mouth daily 90 tablet 3     vitamin C (ASCORBIC ACID) 100 MG tablet          Allergies   Allergen Reactions     Penicillins      Breathing problems        Social History     Tobacco Use     Smoking status: Never Smoker     Smokeless tobacco: Never Used   Substance Use Topics     Alcohol use: Yes     Comment: 7 drinks a week     History   Drug Use Unknown         Objective     /68   Pulse 69   Temp 98  F (36.7  C)   Ht 1.702 m (5' 7.01\")   Wt 69.4 kg (153 lb 1.3 oz)   SpO2 97%   BMI 23.97 kg/m      Physical Exam    GENERAL APPEARANCE: healthy, alert and no distress     EYES: EOMI, PERRL     HENT: ear canals and TM's normal and nose and mouth without ulcers or lesions     NECK: no adenopathy, left sided thyroid enlargement smooth, nontender     RESP: lungs clear to auscultation - no rales, rhonchi or wheezes     CV: regular rates and rhythm, normal S1 S2,no murmur, click or rub     ABDOMEN:  soft, nontender, no HSM or masses and bowel sounds normal     MS: extremities normal- no gross deformities noted, no evidence of inflammation in joints,      Left hip: slightly dampened ROM. Point tenderness over bony landmark at posterior left hip. Bursa landmark without tenderness.      SKIN: no " suspicious lesions or rashes     NEURO: Normal strength and tone, sensory exam grossly normal, mentation intact and speech normal     PSYCH: mentation appears normal. and affect normal/bright     EXT: no edema    Recent Labs   Lab Test 10/18/21  0833 08/13/21  1113 08/13/21  0805   HGB 13.3  --  12.2     --  260   A1C  --  5.3  --         Diagnostics:  No labs were ordered during this visit.   No EKG required for low risk surgery (cataract, skin procedure, breast biopsy, etc).    Revised Cardiac Risk Index (RCRI):  The patient has the following serious cardiovascular risks for perioperative complications:   - No serious cardiac risks = 0 points     RCRI Interpretation: 0 points: Class I (very low risk - 0.4% complication rate)           Signed Electronically by: Trish Aguilar MD  Copy of this evaluation report is provided to requesting physician.    Trish Aguilar MD  Internal Medicine/Pediatrics      I, Angus Saha, am serving as a scribe to document services personally performed by Dr. Trish Aguilar, based on data collection and the provider's statements to me. Dr. Aguilar has reviewed, edited, and approved the above note.

## 2022-09-30 ENCOUNTER — OFFICE VISIT (OUTPATIENT)
Dept: FAMILY MEDICINE | Facility: CLINIC | Age: 57
End: 2022-09-30
Payer: COMMERCIAL

## 2022-09-30 VITALS
BODY MASS INDEX: 24.03 KG/M2 | OXYGEN SATURATION: 97 % | HEART RATE: 69 BPM | HEIGHT: 67 IN | TEMPERATURE: 98 F | DIASTOLIC BLOOD PRESSURE: 68 MMHG | WEIGHT: 153.08 LBS | SYSTOLIC BLOOD PRESSURE: 111 MMHG

## 2022-09-30 DIAGNOSIS — M25.552 HIP PAIN, LEFT: ICD-10-CM

## 2022-09-30 DIAGNOSIS — Z01.818 PREOP GENERAL PHYSICAL EXAM: Primary | ICD-10-CM

## 2022-09-30 NOTE — NURSING NOTE
"Injectable Influenza Immunization Documentation    1.  Has the patient received the information for the injectable influenza vaccine? YES     2. Is the patient 6 months of age or older? YES     3. Does the patient have any of the following contraindications?         Severe allergy to eggs? No     Severe allergic reaction to previous influenza vaccines? No   Severe allergy to latex? No       History of Guillain-Kinderhook syndrome? No     Currently have a temperature greater than 100.4F? No        4.  Severely egg allergic patients should have flu vaccine eligibility assessed by an MD, RN, or pharmacist, and those who received flu vaccine should be observed for 15 min by an MD, RN, Pharmacist, Medical Technician, or member of clinic staff.\": YES    5. Latex-allergic patients should be given latex-free influenza vaccine Yes. Please reference the Vaccine latex table to determine if your clinic s product is latex-containing.       Vaccination given by Roxana Armendariz MA        "

## 2022-09-30 NOTE — NURSING NOTE
"57 year old  Chief Complaint   Patient presents with     Pre-Op Exam     Thyroid surgery       Blood pressure 111/68, pulse 69, temperature 98  F (36.7  C), height 1.702 m (5' 7.01\"), weight 69.4 kg (153 lb 1.3 oz), SpO2 97 %, not currently breastfeeding. Body mass index is 23.97 kg/m .  Patient Active Problem List   Diagnosis     Hypothyroidism with goiter     Family history of colon cancer     Non-toxic multinodular goiter     Hip pain, left       Wt Readings from Last 2 Encounters:   09/30/22 69.4 kg (153 lb 1.3 oz)   09/09/22 67.1 kg (148 lb)     BP Readings from Last 3 Encounters:   09/30/22 111/68   09/09/22 126/74   06/01/22 101/66         Current Outpatient Medications   Medication     Calcium Citrate-Vitamin D (CALCIUM CITRATE+D3 PETITES) 200-250 MG-UNIT TABS     ferrous sulfate (FEROSUL) 325 (65 Fe) MG tablet     levothyroxine (SYNTHROID/LEVOTHROID) 88 MCG tablet     vitamin C (ASCORBIC ACID) 100 MG tablet     No current facility-administered medications for this visit.       Social History     Tobacco Use     Smoking status: Never Smoker     Smokeless tobacco: Never Used   Vaping Use     Vaping Use: Never used   Substance Use Topics     Alcohol use: Yes     Comment: 7 drinks a week     Drug use: Never       Health Maintenance Due   Topic Date Due     ADVANCE CARE PLANNING  Never done     HIV SCREENING  Never done     HEPATITIS C SCREENING  Never done     LIPID  Never done     PREVENTIVE CARE VISIT  09/18/2020     COVID-19 Vaccine (5 - Booster for Moderna series) 07/22/2022     INFLUENZA VACCINE (1) 09/01/2022     HPV TEST  09/19/2022     PAP  09/19/2022       No results found for: PAP      September 30, 2022 7:53 AM  "

## 2022-10-19 ENCOUNTER — HOSPITAL ENCOUNTER (OUTPATIENT)
Facility: CLINIC | Age: 57
Discharge: HOME | End: 2022-10-19
Attending: SURGERY | Admitting: SURGERY
Payer: COMMERCIAL

## 2022-10-19 ENCOUNTER — ANESTHESIA EVENT (OUTPATIENT)
Dept: SURGERY | Facility: CLINIC | Age: 57
End: 2022-10-19
Payer: COMMERCIAL

## 2022-10-19 ENCOUNTER — APPOINTMENT (OUTPATIENT)
Dept: SURGERY | Facility: PHYSICIAN GROUP | Age: 57
End: 2022-10-19
Payer: COMMERCIAL

## 2022-10-19 ENCOUNTER — ANESTHESIA (OUTPATIENT)
Dept: SURGERY | Facility: CLINIC | Age: 57
End: 2022-10-19
Payer: COMMERCIAL

## 2022-10-19 VITALS
RESPIRATION RATE: 14 BRPM | DIASTOLIC BLOOD PRESSURE: 80 MMHG | HEART RATE: 60 BPM | OXYGEN SATURATION: 97 % | TEMPERATURE: 98.4 F | BODY MASS INDEX: 23.97 KG/M2 | HEIGHT: 67 IN | SYSTOLIC BLOOD PRESSURE: 128 MMHG | WEIGHT: 152.7 LBS

## 2022-10-19 DIAGNOSIS — E04.2 NON-TOXIC MULTINODULAR GOITER: Primary | ICD-10-CM

## 2022-10-19 PROCEDURE — 710N000012 HC RECOVERY PHASE 2, PER MINUTE: Performed by: SURGERY

## 2022-10-19 PROCEDURE — 272N000001 HC OR GENERAL SUPPLY STERILE: Performed by: SURGERY

## 2022-10-19 PROCEDURE — 250N000009 HC RX 250: Performed by: SURGERY

## 2022-10-19 PROCEDURE — 999N000141 HC STATISTIC PRE-PROCEDURE NURSING ASSESSMENT: Performed by: SURGERY

## 2022-10-19 PROCEDURE — 370N000017 HC ANESTHESIA TECHNICAL FEE, PER MIN: Performed by: SURGERY

## 2022-10-19 PROCEDURE — 258N000003 HC RX IP 258 OP 636: Performed by: ANESTHESIOLOGY

## 2022-10-19 PROCEDURE — 60220 PARTIAL REMOVAL OF THYROID: CPT | Mod: LT | Performed by: SURGERY

## 2022-10-19 PROCEDURE — 258N000003 HC RX IP 258 OP 636

## 2022-10-19 PROCEDURE — 60220 PARTIAL REMOVAL OF THYROID: CPT | Mod: LT | Performed by: PHYSICIAN ASSISTANT

## 2022-10-19 PROCEDURE — 88307 TISSUE EXAM BY PATHOLOGIST: CPT | Mod: 26 | Performed by: PATHOLOGY

## 2022-10-19 PROCEDURE — 250N000011 HC RX IP 250 OP 636: Performed by: SURGERY

## 2022-10-19 PROCEDURE — 250N000009 HC RX 250

## 2022-10-19 PROCEDURE — 250N000013 HC RX MED GY IP 250 OP 250 PS 637: Performed by: ANESTHESIOLOGY

## 2022-10-19 PROCEDURE — 250N000011 HC RX IP 250 OP 636

## 2022-10-19 PROCEDURE — 360N000076 HC SURGERY LEVEL 3, PER MIN: Performed by: SURGERY

## 2022-10-19 PROCEDURE — 710N000009 HC RECOVERY PHASE 1, LEVEL 1, PER MIN: Performed by: SURGERY

## 2022-10-19 PROCEDURE — 88307 TISSUE EXAM BY PATHOLOGIST: CPT | Mod: TC | Performed by: SURGERY

## 2022-10-19 RX ORDER — ACETAMINOPHEN 500 MG
1000 TABLET ORAL EVERY 6 HOURS PRN
COMMUNITY
Start: 2022-10-19 | End: 2024-01-04

## 2022-10-19 RX ORDER — MEPERIDINE HYDROCHLORIDE 25 MG/ML
12.5 INJECTION INTRAMUSCULAR; INTRAVENOUS; SUBCUTANEOUS
Status: DISCONTINUED | OUTPATIENT
Start: 2022-10-19 | End: 2022-10-19 | Stop reason: HOSPADM

## 2022-10-19 RX ORDER — ONDANSETRON 4 MG/1
4 TABLET, ORALLY DISINTEGRATING ORAL
Status: DISCONTINUED | OUTPATIENT
Start: 2022-10-19 | End: 2022-10-19

## 2022-10-19 RX ORDER — LIDOCAINE HYDROCHLORIDE 10 MG/ML
INJECTION, SOLUTION INFILTRATION; PERINEURAL PRN
Status: DISCONTINUED | OUTPATIENT
Start: 2022-10-19 | End: 2022-10-19

## 2022-10-19 RX ORDER — METOPROLOL TARTRATE 1 MG/ML
1-2 INJECTION, SOLUTION INTRAVENOUS EVERY 5 MIN PRN
Status: DISCONTINUED | OUTPATIENT
Start: 2022-10-19 | End: 2022-10-19 | Stop reason: HOSPADM

## 2022-10-19 RX ORDER — FENTANYL CITRATE 50 UG/ML
50 INJECTION, SOLUTION INTRAMUSCULAR; INTRAVENOUS
Status: DISCONTINUED | OUTPATIENT
Start: 2022-10-19 | End: 2022-10-19 | Stop reason: HOSPADM

## 2022-10-19 RX ORDER — MAGNESIUM HYDROXIDE 1200 MG/15ML
LIQUID ORAL PRN
Status: DISCONTINUED | OUTPATIENT
Start: 2022-10-19 | End: 2022-10-19 | Stop reason: HOSPADM

## 2022-10-19 RX ORDER — FENTANYL CITRATE 50 UG/ML
50 INJECTION, SOLUTION INTRAMUSCULAR; INTRAVENOUS EVERY 5 MIN PRN
Status: DISCONTINUED | OUTPATIENT
Start: 2022-10-19 | End: 2022-10-19 | Stop reason: HOSPADM

## 2022-10-19 RX ORDER — OXYCODONE HYDROCHLORIDE 5 MG/1
5 TABLET ORAL
Status: DISCONTINUED | OUTPATIENT
Start: 2022-10-19 | End: 2022-10-19

## 2022-10-19 RX ORDER — KETAMINE HYDROCHLORIDE 10 MG/ML
INJECTION INTRAMUSCULAR; INTRAVENOUS PRN
Status: DISCONTINUED | OUTPATIENT
Start: 2022-10-19 | End: 2022-10-19

## 2022-10-19 RX ORDER — PROPOFOL 10 MG/ML
INJECTION, EMULSION INTRAVENOUS PRN
Status: DISCONTINUED | OUTPATIENT
Start: 2022-10-19 | End: 2022-10-19

## 2022-10-19 RX ORDER — DEXAMETHASONE SODIUM PHOSPHATE 4 MG/ML
INJECTION, SOLUTION INTRA-ARTICULAR; INTRALESIONAL; INTRAMUSCULAR; INTRAVENOUS; SOFT TISSUE PRN
Status: DISCONTINUED | OUTPATIENT
Start: 2022-10-19 | End: 2022-10-19

## 2022-10-19 RX ORDER — IBUPROFEN 200 MG
600 TABLET ORAL EVERY 6 HOURS PRN
COMMUNITY
Start: 2022-10-19 | End: 2022-11-28

## 2022-10-19 RX ORDER — HYDROMORPHONE HCL IN WATER/PF 6 MG/30 ML
0.4 PATIENT CONTROLLED ANALGESIA SYRINGE INTRAVENOUS EVERY 5 MIN PRN
Status: DISCONTINUED | OUTPATIENT
Start: 2022-10-19 | End: 2022-10-19 | Stop reason: HOSPADM

## 2022-10-19 RX ORDER — ACETAMINOPHEN 325 MG/1
650 TABLET ORAL
Status: DISCONTINUED | OUTPATIENT
Start: 2022-10-19 | End: 2022-10-19 | Stop reason: HOSPADM

## 2022-10-19 RX ORDER — LIDOCAINE 40 MG/G
CREAM TOPICAL
Status: DISCONTINUED | OUTPATIENT
Start: 2022-10-19 | End: 2022-10-19 | Stop reason: HOSPADM

## 2022-10-19 RX ORDER — CLINDAMYCIN PHOSPHATE 900 MG/50ML
900 INJECTION, SOLUTION INTRAVENOUS
Status: DISCONTINUED | OUTPATIENT
Start: 2022-10-19 | End: 2022-10-19 | Stop reason: HOSPADM

## 2022-10-19 RX ORDER — GLYCOPYRROLATE 0.2 MG/ML
INJECTION, SOLUTION INTRAMUSCULAR; INTRAVENOUS PRN
Status: DISCONTINUED | OUTPATIENT
Start: 2022-10-19 | End: 2022-10-19

## 2022-10-19 RX ORDER — ONDANSETRON 2 MG/ML
4 INJECTION INTRAMUSCULAR; INTRAVENOUS EVERY 30 MIN PRN
Status: DISCONTINUED | OUTPATIENT
Start: 2022-10-19 | End: 2022-10-19 | Stop reason: HOSPADM

## 2022-10-19 RX ORDER — EPHEDRINE SULFATE 50 MG/ML
INJECTION, SOLUTION INTRAMUSCULAR; INTRAVENOUS; SUBCUTANEOUS PRN
Status: DISCONTINUED | OUTPATIENT
Start: 2022-10-19 | End: 2022-10-19

## 2022-10-19 RX ORDER — CLINDAMYCIN PHOSPHATE 900 MG/50ML
900 INJECTION, SOLUTION INTRAVENOUS SEE ADMIN INSTRUCTIONS
Status: DISCONTINUED | OUTPATIENT
Start: 2022-10-19 | End: 2022-10-19 | Stop reason: HOSPADM

## 2022-10-19 RX ORDER — SODIUM CHLORIDE, SODIUM LACTATE, POTASSIUM CHLORIDE, CALCIUM CHLORIDE 600; 310; 30; 20 MG/100ML; MG/100ML; MG/100ML; MG/100ML
INJECTION, SOLUTION INTRAVENOUS CONTINUOUS
Status: DISCONTINUED | OUTPATIENT
Start: 2022-10-19 | End: 2022-10-19 | Stop reason: HOSPADM

## 2022-10-19 RX ORDER — ONDANSETRON 4 MG/1
4 TABLET, ORALLY DISINTEGRATING ORAL EVERY 30 MIN PRN
Status: DISCONTINUED | OUTPATIENT
Start: 2022-10-19 | End: 2022-10-19 | Stop reason: HOSPADM

## 2022-10-19 RX ORDER — ACETAMINOPHEN 325 MG/1
975 TABLET ORAL ONCE
Status: COMPLETED | OUTPATIENT
Start: 2022-10-19 | End: 2022-10-19

## 2022-10-19 RX ORDER — HYDRALAZINE HYDROCHLORIDE 20 MG/ML
2.5-5 INJECTION INTRAMUSCULAR; INTRAVENOUS EVERY 10 MIN PRN
Status: DISCONTINUED | OUTPATIENT
Start: 2022-10-19 | End: 2022-10-19 | Stop reason: HOSPADM

## 2022-10-19 RX ORDER — ONDANSETRON 2 MG/ML
INJECTION INTRAMUSCULAR; INTRAVENOUS PRN
Status: DISCONTINUED | OUTPATIENT
Start: 2022-10-19 | End: 2022-10-19

## 2022-10-19 RX ORDER — BUPIVACAINE HYDROCHLORIDE 5 MG/ML
INJECTION, SOLUTION PERINEURAL PRN
Status: DISCONTINUED | OUTPATIENT
Start: 2022-10-19 | End: 2022-10-19 | Stop reason: HOSPADM

## 2022-10-19 RX ORDER — OXYCODONE HYDROCHLORIDE 5 MG/1
5-10 TABLET ORAL EVERY 4 HOURS PRN
Qty: 10 TABLET | Refills: 0 | Status: SHIPPED | OUTPATIENT
Start: 2022-10-19 | End: 2022-11-03

## 2022-10-19 RX ORDER — OXYCODONE HYDROCHLORIDE 5 MG/1
5 TABLET ORAL EVERY 4 HOURS PRN
Status: DISCONTINUED | OUTPATIENT
Start: 2022-10-19 | End: 2022-10-19 | Stop reason: HOSPADM

## 2022-10-19 RX ORDER — FENTANYL CITRATE 50 UG/ML
INJECTION, SOLUTION INTRAMUSCULAR; INTRAVENOUS PRN
Status: DISCONTINUED | OUTPATIENT
Start: 2022-10-19 | End: 2022-10-19

## 2022-10-19 RX ADMIN — FENTANYL CITRATE 100 MCG: 50 INJECTION, SOLUTION INTRAMUSCULAR; INTRAVENOUS at 09:28

## 2022-10-19 RX ADMIN — CLINDAMYCIN PHOSPHATE 900 MG: 900 INJECTION, SOLUTION INTRAVENOUS at 09:20

## 2022-10-19 RX ADMIN — PHENYLEPHRINE HYDROCHLORIDE 100 MCG: 10 INJECTION INTRAVENOUS at 10:53

## 2022-10-19 RX ADMIN — MIDAZOLAM 2 MG: 1 INJECTION INTRAMUSCULAR; INTRAVENOUS at 09:20

## 2022-10-19 RX ADMIN — PROPOFOL 200 MG: 10 INJECTION, EMULSION INTRAVENOUS at 09:28

## 2022-10-19 RX ADMIN — PHENYLEPHRINE HYDROCHLORIDE 50 MCG: 10 INJECTION INTRAVENOUS at 09:46

## 2022-10-19 RX ADMIN — ONDANSETRON HYDROCHLORIDE 4 MG: 2 INJECTION, SOLUTION INTRAVENOUS at 11:10

## 2022-10-19 RX ADMIN — PROPOFOL 75 MCG/KG/MIN: 10 INJECTION, EMULSION INTRAVENOUS at 09:35

## 2022-10-19 RX ADMIN — PHENYLEPHRINE HYDROCHLORIDE 50 MCG: 10 INJECTION INTRAVENOUS at 09:41

## 2022-10-19 RX ADMIN — FENTANYL CITRATE 100 MCG: 50 INJECTION, SOLUTION INTRAMUSCULAR; INTRAVENOUS at 09:49

## 2022-10-19 RX ADMIN — PHENYLEPHRINE HYDROCHLORIDE 50 MCG: 10 INJECTION INTRAVENOUS at 10:12

## 2022-10-19 RX ADMIN — Medication 10 MG: at 09:48

## 2022-10-19 RX ADMIN — GLYCOPYRROLATE 0.2 MG: 0.2 INJECTION, SOLUTION INTRAMUSCULAR; INTRAVENOUS at 09:35

## 2022-10-19 RX ADMIN — DEXAMETHASONE SODIUM PHOSPHATE 8 MG: 4 INJECTION, SOLUTION INTRA-ARTICULAR; INTRALESIONAL; INTRAMUSCULAR; INTRAVENOUS; SOFT TISSUE at 09:28

## 2022-10-19 RX ADMIN — Medication 100 MG: at 09:28

## 2022-10-19 RX ADMIN — CLINDAMYCIN PHOSPHATE 900 MG: 900 INJECTION, SOLUTION INTRAVENOUS at 07:59

## 2022-10-19 RX ADMIN — Medication 30 MG: at 09:40

## 2022-10-19 RX ADMIN — PHENYLEPHRINE HYDROCHLORIDE 50 MCG: 10 INJECTION INTRAVENOUS at 09:48

## 2022-10-19 RX ADMIN — SODIUM CHLORIDE, POTASSIUM CHLORIDE, SODIUM LACTATE AND CALCIUM CHLORIDE: 600; 310; 30; 20 INJECTION, SOLUTION INTRAVENOUS at 07:27

## 2022-10-19 RX ADMIN — PHENYLEPHRINE HYDROCHLORIDE 100 MCG: 10 INJECTION INTRAVENOUS at 10:02

## 2022-10-19 RX ADMIN — FENTANYL CITRATE 50 MCG: 50 INJECTION, SOLUTION INTRAMUSCULAR; INTRAVENOUS at 11:04

## 2022-10-19 RX ADMIN — LIDOCAINE HYDROCHLORIDE 50 MG: 10 INJECTION, SOLUTION INFILTRATION; PERINEURAL at 09:28

## 2022-10-19 RX ADMIN — FENTANYL CITRATE 50 MCG: 50 INJECTION, SOLUTION INTRAMUSCULAR; INTRAVENOUS at 10:20

## 2022-10-19 RX ADMIN — ACETAMINOPHEN 975 MG: 325 TABLET, FILM COATED ORAL at 12:39

## 2022-10-19 ASSESSMENT — ACTIVITIES OF DAILY LIVING (ADL)
ADLS_ACUITY_SCORE: 35
ADLS_ACUITY_SCORE: 33
ADLS_ACUITY_SCORE: 35

## 2022-10-19 NOTE — ANESTHESIA PREPROCEDURE EVALUATION
Anesthesia Pre-Procedure Evaluation    Patient: Earnestine Mcpherson   MRN: [id number] : 1965        Procedure : Procedure(s):  Thyroid Lobectomy - Left          Past Medical History:   Diagnosis Date     Thyroid disease       Past Surgical History:   Procedure Laterality Date      SECTION        SECTION       COLONOSCOPY  2016    due every 5 yr     COLONOSCOPY N/A 7/15/2021    Procedure: COLONOSCOPY, WITH POLYPECTOMY;  Surgeon: Ramila Gay MD;  Location: [facility] OR     FINGER SURGERY Right 1966    right hand ring finger      Allergies   Allergen Reactions     Penicillins      Breathing problems      Social History     Tobacco Use     Smoking status: Never     Smokeless tobacco: Never   Substance Use Topics     Alcohol use: Yes     Comment: 7 drinks a week      Wt Readings from Last 1 Encounters:   10/19/22 69.3 kg (152 lb 11.2 oz)        Anesthesia Evaluation   Pt has had prior anesthetic.         ROS/MED HX  ENT/Pulmonary:  - neg pulmonary ROS  (-) sleep apnea   Neurologic:       Cardiovascular:  - neg cardiovascular ROS     METS/Exercise Tolerance:     Hematologic:       Musculoskeletal:   (+) arthritis,     GI/Hepatic:    (-) GERD   Renal/Genitourinary:       Endo:     (+) thyroid problem, hypothyroidism,     Psychiatric/Substance Use:       Infectious Disease:       Malignancy:       Other:            Physical Exam    Airway        Mallampati: II   TM distance: > 3 FB   Neck ROM: full     Respiratory Devices and Support         Dental           Cardiovascular          Rhythm and rate: regular and normal     Pulmonary           breath sounds clear to auscultation           OUTSIDE LABS:  CBC:   Lab Results   Component Value Date    WBC 4.7 10/18/2021    WBC 4.8 2021    HGB 13.3 10/18/2021    HGB 12.2 2021    HCT 40.5 10/18/2021    HCT 38.3 2021     10/18/2021     2021     BMP: No results found for: NA, POTASSIUM, CHLORIDE, CO2, BUN, CR,  GLC  COAGS: No results found for: PTT, INR, FIBR  POC: No results found for: BGM, HCG, HCGS  HEPATIC: No results found for: ALBUMIN, PROTTOTAL, ALT, AST, GGT, ALKPHOS, BILITOTAL, BILIDIRECT, CATALINA  OTHER:   Lab Results   Component Value Date    A1C 5.3 08/13/2021    TSH 2.83 04/20/2022    T4 0.94 02/24/2020       Anesthesia Plan    ASA Status:  2   NPO Status:  NPO Appropriate    Anesthesia Type: General.     - Airway: ETT   Induction: Intravenous.   Maintenance: Balanced.   Techniques and Equipment:     - Airway: Video-Laryngoscope         Consents    Anesthesia Plan(s) and associated risks, benefits, and realistic alternatives discussed. Questions answered and patient/representative(s) expressed understanding.    - Discussed:     - Discussed with:  Patient         Postoperative Care    Pain management: IV analgesics, Oral pain medications, Multi-modal analgesia.   PONV prophylaxis: Ondansetron (or other 5HT-3), Dexamethasone or Solumedrol     Comments:                Shaheed Galarza MD

## 2022-10-19 NOTE — OP NOTE
General Surgery Operative Note    PREOPERATIVE DIAGNOSIS:  Thyroid nodule [E04.1]    POSTOPERATIVE DIAGNOSIS:  Same    PROCEDURE:   Left Thyroid Lobectomy    ANESTHESIA:  General.    PREOPERATIVE MEDICATIONS:  Ancef 2 gm    SURGEON:  Adrienne Desai MD    ASSISTANT:  Alka Masters PA-C,  - the physician assistant was medically necessary in providing adequate exposure in the operating field, maintaining hemostasis, cutting suture, clamping and ligating blood vessels, and visualization of the anatomic structures throughout the surgical procedure.    ESTIMATED BLOOD LOSS:  50 ml    INDICATIONS:  Earnestine Mcpherson is a 57 year old female with a thyroid nodule identified last year.  It is large in size at 5 cm and is increasingly symptomatic with swallowing.  A fine needle aspiration biopsy showed that it is benign.  She is here today for left thyroid lobectomy, possible total thyroidectomy.    PROCEDURE:    Earnestine Mcpherson was placed supine on the operating room table and general endotracheal anesthesia was induced. The patient was then placed in cervical extension and the neck was prepped and draped in our usual sterile fashion. After a confirmatory pause was performed, a cervical collar incision was made sharply and then carried down through the subcutaneous tissues and platysma with cautery. Superior and inferior flaps were raised. The strap muscles were divided in the midline and the larynx and trachea were identified. The strap muscles were elevated off the left thyroid lobe bluntly and middle thyroid vein was identified and divided with LigaSure. The superior pole vessels were dissected and divided with Ligasure allowing the thyroid lobe to be rotated medially.  The superior parathyroid was identified and preserved on its vascular pedicle.  The inferior parathyroid was identified on the thyroid capsule and was carefully dissected off. Deep to this, we identified the recurrent laryngeal nerve in its  typical location.  Its function was confirmed using a nerve monitor. A large inferior pole cyst was bluntly freed from its position behind the clavicle and brought out of the operative wound.   With the nerve in view, we ligated and divided the inferior pole vessels.  We followed the nerve cephalad, carfully  the thyroid away from it.  During this process, the inferior cyst ruptured with return of bloody cyst contents. The cyst walls were sutures back together to prevent further spillage and to maintain a dry surgical field. The thyroid was dissected off the trachea at the ligament of Padilla. The thyroid isthmus was divided with a ligasure device. The superior pole was marked with a suture and the specimen was passed off to pathology for review.  The left central neck lymph nodes were inspected and found to be grossly normal.  At conclusion of the procedure, there was excellent recurrent laryngeal nerve signal. We irrigated with saline and ensured hemostasis with valsalva.  Fibrillar hemostatic agent was placed in the operative bed   The midline fascia and platysma were closed with interrupted 3-0 vicryl suture and the skin was closed with running 4-0 subcuticular Monocryl. A dry sterile dressing was placed and the patient was transferred to recovery in good condition.      INTRAOPERATIVE FINDINGS:    1.  Large cystic left thyroid nodule  2.   Left recurrent laryngeal nerve seen and preserved.  Function confirmed by nerve monitor.  3.  Left superior and inferior parathyroid glands seen and preserved.    Specimens:   ID Type Source Tests Collected by Time Destination   1 : Thyroid, left lobe - stitch marks superior pole Tissue Thyroid, left SURGICAL PATHOLOGY EXAM Adrienne Desai MD 10/19/2022 11:08 AM        Adrienne Desai MD

## 2022-10-19 NOTE — DISCHARGE INSTRUCTIONS
"  Maximum Ibuprofen/Motrin in 24 hours = 3,000 mg     Maximum Acetaminophen/Tylenol in 24 hours = 4,000 mg  Had 975 mg at \"1230 PM.\" Next possible dose at 630 PM.        GENERAL ANESTHESIA OR SEDATION ADULT DISCHARGE INSTRUCTIONS   SPECIAL PRECAUTIONS FOR 24 HOURS AFTER SURGERY    IT IS NOT UNUSUAL TO FEEL LIGHT-HEADED OR FAINT, UP TO 24 HOURS AFTER SURGERY OR WHILE TAKING PAIN MEDICATION.  IF YOU HAVE THESE SYMPTOMS; SIT FOR A FEW MINUTES BEFORE STANDING AND HAVE SOMEONE ASSIST YOU WHEN YOU GET UP TO WALK OR USE THE BATHROOM.    YOU SHOULD REST AND RELAX FOR THE NEXT 24 HOURS AND YOU MUST MAKE ARRANGEMENTS TO HAVE SOMEONE STAY WITH YOU FOR AT LEAST 24 HOURS AFTER YOUR DISCHARGE.  AVOID HAZARDOUS AND STRENUOUS ACTIVITIES.  DO NOT MAKE IMPORTANT DECISIONS FOR 24 HOURS.    DO NOT DRIVE ANY VEHICLE OR OPERATE MECHANICAL EQUIPMENT FOR 24 HOURS FOLLOWING THE END OF YOUR SURGERY.  EVEN THOUGH YOU MAY FEEL NORMAL, YOUR REACTIONS MAY BE AFFECTED BY THE MEDICATION YOU HAVE RECEIVED.    DO NOT DRINK ALCOHOLIC BEVERAGES FOR 24 HOURS FOLLOWING YOUR SURGERY.    DRINK CLEAR LIQUIDS (APPLE JUICE, GINGER ALE, 7-UP, BROTH, ETC.).  PROGRESS TO YOUR REGULAR DIET AS YOU FEEL ABLE.    YOU MAY HAVE A DRY MOUTH, A SORE THROAT, MUSCLES ACHES OR TROUBLE SLEEPING.  THESE SHOULD GO AWAY AFTER 24 HOURS.    CALL YOUR DOCTOR FOR ANY OF THE FOLLOWING:  SIGNS OF INFECTION (FEVER, GROWING TENDERNESS AT THE SURGERY SITE, A LARGE AMOUNT OF DRAINAGE OR BLEEDING, SEVERE PAIN, FOUL-SMELLING DRAINAGE, REDNESS OR SWELLING.    IT HAS BEEN OVER 8 TO 10 HOURS SINCE SURGERY AND YOU ARE STILL NOT ABLE TO URINATE (PASS WATER).    "

## 2022-10-19 NOTE — ANESTHESIA POSTPROCEDURE EVALUATION
Patient: Earnestine Mcpherson    Procedure: Procedure(s):  Thyroid Lobectomy - Left       Anesthesia Type:  General    Note:  Disposition: Outpatient   Postop Pain Control: Uneventful            Sign Out: Well controlled pain   PONV: No   Neuro/Psych: Uneventful            Sign Out: Acceptable/Baseline neuro status   Airway/Respiratory: Uneventful            Sign Out: Acceptable/Baseline resp. status   CV/Hemodynamics: Uneventful            Sign Out: Acceptable CV status; No obvious hypovolemia; No obvious fluid overload   Other NRE: NONE   DID A NON-ROUTINE EVENT OCCUR? No           Last vitals:  Vitals Value Taken Time   /71 10/19/22 1315   Temp 97  F (36.1  C) 10/19/22 1315   Pulse 81 10/19/22 1319   Resp 15 10/19/22 1319   SpO2 94 % 10/19/22 1319   Vitals shown include unvalidated device data.    Electronically Signed By: Shaheed Galarza MD  October 19, 2022  3:07 PM

## 2022-10-19 NOTE — ANESTHESIA PROCEDURE NOTES
Airway       Patient location during procedure: OR       Procedure Start/Stop Times: 10/19/2022 9:30 AM  Staff -        CRNA: Anastasia Mcmanus APRN CRNA       Performed By: CRNAIndications and Patient Condition       Indications for airway management: zina-procedural       Induction type:intravenous       Mask difficulty assessment: 1 - vent by mask    Final Airway Details       Final airway type: endotracheal airway       Successful airway: NIM  Endotracheal Airway Details        ETT size (mm): 7.0       Cuffed: yes       Successful intubation technique: video laryngoscopy       VL Blade Size: Glidescope 3       Grade View of Cords: 1       Adjucts: stylet       Position: Center       Measured from: gums/teeth       Secured at (cm): 24       Bite block used: None    Post intubation assessment        Placement verified by: capnometry, equal breath sounds and chest rise        Number of attempts at approach: 1       Secured with: plastic tape       Ease of procedure: easy       Dentition: Intact    Medication(s) Administered   Medication Administration Time: 10/19/2022 9:30 AM

## 2022-10-19 NOTE — ANESTHESIA CARE TRANSFER NOTE
Patient: Earnestine Mcpherson    Procedure: Procedure(s):  Thyroid Lobectomy - Left       Diagnosis: Thyroid nodule [E04.1]  Diagnosis Additional Information: No value filed.    Anesthesia Type:   General     Note:    Oropharynx: oropharynx clear of all foreign objects  Level of Consciousness: drowsy  Oxygen Supplementation: face mask  Level of Supplemental Oxygen (L/min / FiO2): 6  Independent Airway: airway patency satisfactory and stable  Dentition: dentition unchanged  Vital Signs Stable: post-procedure vital signs reviewed and stable  Report to RN Given: handoff report given  Patient transferred to: PACU    Handoff Report: Identifed the Patient, Identified the Reponsible Provider, Reviewed the pertinent medical history, Discussed the surgical course, Reviewed Intra-OP anesthesia mangement and issues during anesthesia, Set expectations for post-procedure period and Allowed opportunity for questions and acknowledgement of understanding      Vitals:  Vitals Value Taken Time   BP     Temp 97.2  F (36.2  C) 10/19/22 1150   Pulse 85 10/19/22 1150   Resp 10 10/19/22 1150   SpO2 99 % 10/19/22 1150   Vitals shown include unvalidated device data.    Electronically Signed By: JUDI Castaneda CRNA  October 19, 2022  11:51 AM

## 2022-10-21 LAB
PATH REPORT.COMMENTS IMP SPEC: NORMAL
PATH REPORT.FINAL DX SPEC: NORMAL
PATH REPORT.GROSS SPEC: NORMAL
PATH REPORT.MICROSCOPIC SPEC OTHER STN: NORMAL
PATH REPORT.RELEVANT HX SPEC: NORMAL
PHOTO IMAGE: NORMAL

## 2022-11-03 ENCOUNTER — OFFICE VISIT (OUTPATIENT)
Dept: SURGERY | Facility: CLINIC | Age: 57
End: 2022-11-03
Payer: COMMERCIAL

## 2022-11-03 VITALS
RESPIRATION RATE: 16 BRPM | BODY MASS INDEX: 23.86 KG/M2 | OXYGEN SATURATION: 95 % | DIASTOLIC BLOOD PRESSURE: 68 MMHG | HEART RATE: 76 BPM | WEIGHT: 152 LBS | HEIGHT: 67 IN | SYSTOLIC BLOOD PRESSURE: 108 MMHG

## 2022-11-03 DIAGNOSIS — Z09 SURGICAL FOLLOWUP VISIT: Primary | ICD-10-CM

## 2022-11-03 PROCEDURE — 99024 POSTOP FOLLOW-UP VISIT: CPT | Performed by: SURGERY

## 2022-11-03 NOTE — PROGRESS NOTES
"Progress Note/Post-op Follow up:  Earnestine is here for follow up after left thyroid lobectomy 2 weeks ago.  She reports good energy.  Denies numbness or tingling.  Incisional discomfort is nearly resolved, though her neck feels stiff.    Physical Exam:  /68   Pulse 76   Resp 16   Ht 1.702 m (5' 7\")   Wt 68.9 kg (152 lb)   SpO2 95%   BMI 23.81 kg/m    General:  Appears well, in no acute distress  Neck:  Incision site healing nicely, Healing ridge is minimal. Small superficial seroma.             Range of motion is normal.  Neuro:  Voice is Normal.    Pathology:  Pathology was reviewed with the patient.   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 Benign 5 cm cystic nodule.    Assessment and Plan:  Earnestine is doing well after left thyroid lobectomy.  I recommend a follow up with Dr. Renata Kamara in the next few weeks for TSH check to see if her levothyroxine dose needs to be increased. 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 I would be happy to see her if there are any ongoing issues, but currently, there are no signs of post-operative complications.    Adrienne Desai MD  Please route or send letter to:  Renata Kamara MD          "

## 2022-11-16 ENCOUNTER — OFFICE VISIT (OUTPATIENT)
Dept: ORTHOPEDICS | Facility: CLINIC | Age: 57
End: 2022-11-16
Attending: INTERNAL MEDICINE
Payer: COMMERCIAL

## 2022-11-16 ENCOUNTER — ANCILLARY PROCEDURE (OUTPATIENT)
Dept: GENERAL RADIOLOGY | Facility: CLINIC | Age: 57
End: 2022-11-16
Attending: FAMILY MEDICINE
Payer: COMMERCIAL

## 2022-11-16 VITALS
DIASTOLIC BLOOD PRESSURE: 70 MMHG | SYSTOLIC BLOOD PRESSURE: 102 MMHG | BODY MASS INDEX: 23.86 KG/M2 | WEIGHT: 152 LBS | HEIGHT: 67 IN

## 2022-11-16 DIAGNOSIS — M25.552 HIP PAIN, LEFT: ICD-10-CM

## 2022-11-16 DIAGNOSIS — M25.512 ACUTE PAIN OF LEFT SHOULDER: Primary | ICD-10-CM

## 2022-11-16 DIAGNOSIS — M25.512 LEFT SHOULDER PAIN: ICD-10-CM

## 2022-11-16 DIAGNOSIS — M25.552 GREATER TROCHANTERIC PAIN SYNDROME OF LEFT LOWER EXTREMITY: ICD-10-CM

## 2022-11-16 PROCEDURE — 73030 X-RAY EXAM OF SHOULDER: CPT | Mod: TC | Performed by: RADIOLOGY

## 2022-11-16 PROCEDURE — 99204 OFFICE O/P NEW MOD 45 MIN: CPT | Performed by: FAMILY MEDICINE

## 2022-11-16 PROCEDURE — 73502 X-RAY EXAM HIP UNI 2-3 VIEWS: CPT | Mod: TC | Performed by: INTERNAL MEDICINE

## 2022-11-16 RX ORDER — DICLOFENAC SODIUM 75 MG/1
75 TABLET, DELAYED RELEASE ORAL 2 TIMES DAILY
Qty: 20 TABLET | Refills: 0 | Status: SHIPPED | OUTPATIENT
Start: 2022-11-16 | End: 2022-11-28

## 2022-11-16 NOTE — PROGRESS NOTES
CHIEF COMPLAINT:  Pain of the Left Hip and Pain of the Left Shoulder     HISTORY OF PRESENT ILLNESS  Ms. Mcpherson is a pleasant 57 year old right hand dominant female who presents to clinic today with left shoulder pain.  Earnestine explains that her shoulder pain started after she received flu shot and COVID booster to left shoulder.     Location: left shoulder  Quality:  aching  Duration: 2-3 months   Severity: 4/10 at worst  Timing:constant  Modifying factors:  resting and non-use makes it better, movement and use makes it worse  Associated signs & symptoms: pain that worsens with motions above shoulder height and with shoulder abduction  Previous similar pain: No  Treatments to date: ibuprofen as needed    Patient also has complaints of left hip pain ongoing 6 months with no known injury. Has seen PT and is working on HEP. Pain is intermittent, primarily with activity. Lateral sided hip pain.    Additional history: as documented    Review of Systems:    Have you recently had a a fever, chills, weight loss? No    Do you have any vision problems? No    Do you have any chest pain or edema? No    Do you have any shortness of breath or wheezing?  No    Do you have stomach problems? No    Do you have any numbness or focal weakness? No    Do you have diabetes? No    Do you have problems with bleeding or clotting? No    Do you have an rashes or other skin lesions? No    MEDICAL HISTORY  Patient Active Problem List   Diagnosis     Hypothyroidism with goiter     Family history of colon cancer     Non-toxic multinodular goiter     Hip pain, left       Current Outpatient Medications   Medication Sig Dispense Refill     acetaminophen (TYLENOL) 500 MG tablet Take 2 tablets (1,000 mg) by mouth every 6 hours as needed for pain       ferrous sulfate (FEROSUL) 325 (65 Fe) MG tablet        ibuprofen (ADVIL/MOTRIN) 200 MG tablet Take 3 tablets (600 mg) by mouth every 6 hours as needed for moderate pain       levothyroxine  "(SYNTHROID/LEVOTHROID) 88 MCG tablet Take 1 tablet (88 mcg) by mouth daily 90 tablet 3     vitamin C (ASCORBIC ACID) 100 MG tablet          Allergies   Allergen Reactions     Penicillins      Breathing problems       Family History   Problem Relation Age of Onset     Hypertension Mother      Arthritis Mother      Glaucoma Mother      Macular Degeneration Mother      Diabetes Father 80        thin habitus     Hypothyroidism Sister      Cancer Sister         sublingual cancer (salivary?), surgery, no chemo     Hypothyroidism Sister      Colon Polyps Brother      Diabetes Brother 40        thin habitus     Glaucoma Maternal Grandmother      Colon Cancer Paternal Grandfather 65     Crohn's Disease Daughter 21     Liver Cancer Paternal Aunt      Colon Cancer Other 50        paternal side       Additional medical/Social/Surgical histories reviewed in [facility] and updated as appropriate.       PHYSICAL EXAM  /70   Ht 1.702 m (5' 7\")   Wt 68.9 kg (152 lb)   BMI 23.81 kg/m      General  - normal appearance, in no obvious distress  Musculoskeletal - Left shoulder  - inspection: normal bone and joint alignment, no obvious deformity, no scapular winging, no AC step-off  - palpation: mildly tender RC insertion, normal clavicle, non-tender AC  - ROM:  painful terminal flexion and ER at end range, mild restriction.  - strength: 5/5  strength, 5/5 in all shoulder planes except 4+/5 supraspinatus strength  - special tests:  (-) Speed's  (+) Neer  (+) Hawkin's  (+) Yulia's  (-) Adel's  (-) apprehension  (-) subscap lift-off  Neuro  - no sensory or motor deficit, grossly normal coordination, normal muscle tone  Musculoskeletal - left hip  - stance: normal gait without limp  - inspection: no swelling or effusion, normal bone and joint alignment, no obvious deformity  - palpation: tender over the greater trochanter laterally. No anterior hip tenderness. No pain of buttock region.  - ROM: pain with active abduction, normal and " painless flexion, extension, IR, ER  - strength: 5/5 in all planes  - special tests:  (-) SHABANA  (-) FADIR  Neuro  - no sensory or motor deficit, grossly normal coordination, normal muscle tone      IMAGING :XR left hip 2V and left shoulder 4V. Final results and radiologist's interpretation, available in the [facility] health record. Images were reviewed with the patient/family members in the office today. My personal interpretation of the performed imaging is no acute osseous abnormality or significant degenerative changes of joint.     ASSESSMENT & PLAN  Ms. Mcpherson is a 57 year old year old female who presents to clinic today with chronic left hip and more acute left shoulder pain.    Xrays have adequately ruled  Out possibility of osteoarthritis as cause.  Suspected gluteal tendinopathy/bursitis and left shoulder rotator cuff tendinopathy.    Diagnosis:  1. Acute pain of left shoulder  2. Greater trochanteric pain syndrome left hip    Start PT for left shoulder and HEP was provided  Hip with only mild response to PT thus far, offered corticosteroid injection but Earnestine notes pain is not significant enough to proceed  Recommend to continue stretching/strengthening hip and may consider CSI at a later date  If shoulder persists will consider MRI imaging  Reassured lack of any DJD on radiographs today  Follow up PRN    45 minutes on date of the encounter doing chart review, history and examination, independent imaging review, documentation, and additional activities noted above.      It was a pleasure seeing Earnestine today.    Jordon Escudero DO, CAQSM  Primary Care Sports Medicine

## 2022-11-16 NOTE — PATIENT INSTRUCTIONS
Thank you for choosing [facility]    DR NICOLE[facility]S CLINIC LOCATIONS  [place] [address] [address] [facility], 4th [place]   [place], [place], [address] [place] [address]   [phone]       APPOINTMENTS: [phone]    CARE QUESTIONS: [phone],    BILLING QUESTIONS: [phone]    FAX NUMBER: [phone]        Follow up: as needed      1. Left shoulder pain    2. Hip pain, left        Physical Therapy orders have been placed with [facility] ([facility])  You can call [phone] to schedule at your convenience.         Trochanteric Bursitis / Gluteal Tendinopathy    WHAT IS TROCHANTERIC BURSITIS?    Bursitis is irritation or inflammation of the bursa. A bursa is a fluid-filled sac that acts as a cushion between tendons, bones, and skin. There is a bump on the outer side of the upper part of the thigh bone (femur) called the greater trochanter. The trochanteric bursa is located over the greater trochanter. When this bursa is inflamed it is called trochanteric bursitis.          WHAT IS THE CAUSE?     The trochanteric bursa may be inflamed by a group of muscles or tendons rubbing over the bursa and causing friction against the thigh bone. Your iliotibial band goes from the iliac crest of your pelvis down the outer side of your thigh and attaches just below the knee. A tight iliotibial band can lead to trochanteric bursitis. This injury can occur with running, walking, or bicycling, especially when the bicycle seat is too high.    Trochanteric bursitis may also be caused by a fall, by a spine disorder, by differences in the length of your legs, or as a complication of hip surgery.    WHAT ARE THE SYMPTOMS?    You have pain on the upper outer area of your thigh or on the side of your hip. The pain is worse when you walk, bicycle, or go up or down stairs. You have pain when you  move your thigh bone and feel tenderness in the area over the greater trochanter.    HOW IS IT DIAGNOSED?    Your healthcare provider will ask about your symptoms and examine your hip and thigh.    HOW IS IT TREATED?    To treat this condition:    Put an ice pack, gel pack, or package of frozen vegetables wrapped in a cloth on the painful area every 3 to 4 hours for up to 20 minutes at a time until the pain goes away.  Take an anti-inflammatory medicine, such as ibuprofen, as directed by your provider. Nonsteroidal anti-inflammatory medicines (NSAIDs) may cause stomach bleeding and other problems. These risks increase with age. Read the label and take as directed. Unless recommended by your healthcare provider, do not take for more than 10 days.  Follow your provider s instructions for doing exercises to help you recover.        While you are recovering from your injury you will need to change your sport or activity to one that does not make your condition worse. For example, you may need to swim instead of running or bicycling. If you are bicycling, you may need to lower your bicycle seat.    A bursa that is only mildly inflamed and has just started to hurt may improve within a few weeks. A bursa that is significantly inflamed and has been painful for a long time may take up to a few months to improve. You need to stop doing the activities that cause pain until your bursa has healed.    Follow your healthcare provider's instructions. Ask your provider:    How and when you will hear your test results  How long it will take to recover  What activities you should avoid and when you can return to your normal activities  How to take care of yourself at home  What symptoms or problems you should watch for and what to do if you have them  Make sure you know when you should come back for a checkup.    HOW CAN I HELP PREVENT TROCHANTERIC BURSITIS?    Trochanteric bursitis is best prevented by warming up properly and  stretching the muscles on the outer side of your upper thigh.    Developed by [employer].  Published by [employer].  Copyright  2014 [employer] and/or one of its subsidiaries. All rights reserved.    You can do the first 3 stretches to begin stretching the muscles that run along the outside of your hip. You can do the strengthening exercises when the sharp pain lessens.    STRETCHING EXERCISES    Gluteal stretch: Lie on your back with both knees bent. Rest the ankle on your injured side over the knee of your other leg. Grasp the thigh of the leg on the uninjured side and pull toward your chest. You will feel a stretch along the buttocks on the injured side and possibly along the outside of your hip. Hold the stretch for 15 to 30 seconds. Repeat 3 times.    Iliotibial band stretch, standing: Cross your uninjured leg in front of the other leg and bend down and reach toward the inside of your back foot. Do not bend your knees. Hold this position for 15 to 30 seconds. Return to the starting position. Repeat 3 times.  Iliotibial band stretch, side-leaning: Stand sideways near a wall with your injured side closest to the wall. Place a hand on the wall for support. Cross the leg farther from the wall over the other leg. Keep the foot closest to the wall flat on the floor. Lean your hips into the wall. Hold the stretch for 15 to 30 seconds. Repeat 3 times.    STRENGTHENING EXERCISES    Straight leg raise: Lie on your back with your legs straight out in front of you. Bend the knee on your uninjured side and place the foot flat on the floor. Tighten the thigh muscle on your injured side and lift your leg about 8 inches off the floor. Keep your leg straight and your thigh muscle tight. Slowly lower your leg back down to the floor. Do 2 sets of 15.    Prone hip extension: Lie on your stomach with your legs straight out behind you. Fold your arms under your head and rest your head on your arms. Draw your belly button  in towards your spine and tighten your abdominal muscles. Tighten the buttocks and thigh muscles of the leg on your injured side and lift the leg off the floor about 8 inches. Keep your leg straight. Hold for 5 seconds. Then lower your leg and relax. Do 2 sets of 15.    Side-lying leg lift: Lie on your uninjured side. Tighten the front thigh muscles on your injured leg and lift that leg 8 to 10 inches (20 to 25 centimeters) away from the other leg. Keep the leg straight and lower it slowly. Do 2 sets of 15.    Wall squat with a ball: Stand with your back, shoulders, and head against a wall. Look straight ahead. Keep your shoulders relaxed and your feet 3 feet (90 centimeters) from the wall and shoulder's width apart. Place a soccer or basketball-sized ball behind your back. Keeping your back against the wall, slowly squat down to a 45-degree angle. Your thighs will not yet be parallel to the floor. Hold this position for 10 seconds and then slowly slide back up the wall. Repeat 10 times. Build up to 2 sets of 15.    Clam exercise: Lie on your uninjured side with your hips and knees bent and feet together. Slowly raise your top leg toward the ceiling while keeping your heels touching each other. Hold for 2 seconds and lower slowly. Do 2 sets of 15 repetitions.    Side plank: Lie on your side with your legs, hips, and shoulders in a straight line. Prop yourself up onto your forearm with your elbow directly under your shoulder. Lift your hips off the floor and balance on your forearm and the outside of your foot. Try to hold this position for 15 seconds and then slowly lower your hip to the ground. Switch sides and repeat. Work up to holding for 1 minute. This exercise can be made easier by starting with your knees and hips flexed toward your chest.    The plank: Lie on your stomach resting on our forearms. With your legs straight, lift your hips off the floor until they are in line with your shoulders. Support  yourself on your forearms and toes. Hold this position for 15 seconds. (If this is too difficult, you can modify it by placing your knees on the floor.) Repeat 3 times. Work up to increasing your hold time to 30 to 60 seconds.    Developed by [employer].  Published by [employer].  Copyright  2014 [employer] and/or one of its subsidiaries. All rights reserved.           Rotator Cuff Injury     WHAT IS A ROTATOR CUFF INJURY?    A rotator cuff injury is irritation of or damage to the group of tendons and muscles that hold your shoulder joint together. Tendons are strong bands of tissue that attach muscle to bone. You use the muscles and tendons in your shoulder joint to move your shoulder and raise your arm over your head.        WHAT IS THE CAUSE?    A rotator cuff injury can be caused by:    Overuse of your shoulder in a sport or work activity that involves repetitive overhead movement of your shoulder, like swimming, baseball (mainly pitching), football, tennis, painting, plastering, or housework.  A sudden activity that twists your shoulder or tears your tendon, such as using your arm to break a fall, falling onto your arm, or lifting a heavy object  You may be at higher risk for a rotator cuff injury if you have poor head and shoulder posture, especially if you are older.    WHAT ARE THE SYMPTOMS?    Symptoms may include:    Pain and weakness in your arm and shoulder  Loss of shoulder movement, especially when you try to raise your arm overhead    HOW IS IT DIAGNOSED?    Your healthcare provider will ask about your symptoms, activities, and medical history and examine you. You may have X-rays or other scans or procedures, such as:    An ultrasound, which uses sound waves to show pictures of your shoulder joint  An MRI, which uses a strong magnetic field and radio waves to show detailed pictures of your shoulder joint  An arthrogram, which is an X-ray or MRI taken after a dye is injected into your joint  to outline its shape  Arthroscopy, which is a type of surgery done with a small scope inserted into your joint so your provider can look directly at your joint    HOW IS IT TREATED?    You will need to change or stop doing the activities that cause pain until your injury has healed. For example, avoid strenuous activity or any overhead motion that causes pain. Also, try to make sure that you are practicing good posture and are not slouching forward.    Your healthcare provider may recommend stretching and strengthening exercises to help you heal.    If you have a bad tear, you may need to have it repaired with surgery. After surgery, your treatment plan will include physical therapy to strengthen your shoulder as it heals.    The pain often gets better within a few weeks with self-care, but some injuries may take several months or longer to heal. It s important to follow all of your healthcare provider s instructions.    HOW CAN I TAKE CARE OF MYSELF?    To keep swelling down and help relieve pain:    Put an ice pack, gel pack, or package of frozen vegetables wrapped in a cloth on the area every 3 to 4 hours for up to 20 minutes at a time.  Take nonprescription pain medicine, such as acetaminophen, ibuprofen, or naproxen. Nonsteroidal anti-inflammatory medicines (NSAIDs), such as ibuprofen and naproxen, may cause stomach bleeding and other problems. These risks increase with age. Read the label and take as directed. Unless recommended by your healthcare provider, you should not take this medicine for more than 10 days.  Moist heat may help relieve pain, relax your muscles, and make it easier to move your arm and shoulder. Put moist heat on the injured area for 10 to 15 minutes before you do warm-up and stretching exercises. Moist heat includes heat patches or moist heating pads that you can buy at most drugstores, a warm wet washcloth, or a hot shower. To prevent burns to your skin, follow directions on the package  and do not lie on any type of hot pad. Don t use heat if you have swelling.    Follow your healthcare provider's instructions, including any exercises recommended by your provider. Ask your provider:    How and when you will hear your test results  How long it will take to recover  What activities you should avoid, including how much you can lift, and when you can return to your normal activities  How to take care of yourself at home  What symptoms or problems you should watch for and what to do if you have them  Make sure you know when you should come back for a checkup.    HOW CAN I HELP PREVENT A ROTATOR CUFF INJURY?    Warm-up exercises and stretching before activities can help prevent injuries. For example, do exercises that strengthen your shoulder muscles. If your arm or shoulder hurts after exercise, putting ice on it may help keep it from getting injured.    Follow safety rules and use any protective equipment recommended for your work or sport.    Avoid activities that cause pain. For example, avoid lifting heavy objects over your head.    Developed by [employer].  Published by [employer].  Copyright  2014 [employer] and/or one of its subsidiaries. All rights reserved.    Rotator Cuff Exercises    Isometric shoulder external rotation:  a doorway with your elbow bent 90 degrees and the back of the wrist on your injured side pressed against the door frame. Try to press your hand outward into the door frame. Hold for 5 seconds. Do 2 sets of 15.    Isometric shoulder internal rotation:  a doorway with your elbow bent 90 degrees and the front of the wrist on your injured side pressed against the door frame. Try to press your palm into the door frame. Hold for 5 seconds. Do 2 sets of 15.  Wand exercise, flexion: Stand upright and hold a stick in both hands, palms down. Stretch your arms by lifting them over your head, keeping your arms straight. Hold for 5 seconds and return to the  starting position. Repeat 10 times.    Wand exercise, extension: Stand upright and hold a stick in both hands behind your back. Move the stick away from your back. Hold this position for 5 seconds. Relax and return to the starting position. Repeat 10 times.  Wand exercise, external rotation: Lie on your back and hold a stick in both hands, palms up. Your upper arms should be resting on the floor with your elbows at your sides and bent 90 degrees. Use your uninjured arm to push your injured arm out away from your body. Keep the elbow of your injured arm at your side while it is being pushed. Hold the stretch for 5 seconds. Repeat 10 times.    Wand exercise, shoulder abduction and adduction: Stand and hold a stick with both hands, palms facing away from your body. Rest the stick against the front of your thighs. Use your uninjured arm to push your injured arm out to the side and up as high as possible. Keep your arms straight. Hold for 5 seconds. Repeat 10 times.    Resisted shoulder external rotation: Stand sideways next to a door with your injured arm farther from the door. Tie a knot in the end of the tubing and shut the knot in the door at waist level (or use cable weight machine at gym). Hold the other end of the tubing with the hand of your injured arm. Rest the hand of your injured arm across your stomach. Keeping your elbow in at your side, rotate your arm outward and away from your waist. Slowly return your arm to the starting position. Make sure you keep your elbow bent 90 degrees and your forearm parallel to the floor. Repeat 10 times. Build up to 2 sets of 15.    Resisted shoulder internal rotation: Stand sideways next to a door with your injured arm closest to the door. Tie a knot in the end of the tubing and shut the knot in the door at waist level (or use cable weight machine at gym). Hold the other end of the tubing with the hand of your injured arm. Bend the elbow of your injured arm 90 degrees.  "Keeping your elbow in at your side, rotate your forearm across your body and then slowly back to the starting position. Make sure you keep your forearm parallel to the floor. Do 2 sets of 8 to 12.    Scaption: Stand with your arms at your sides and with your elbows straight. Slowly raise your arms to eye level. As you raise your arms, spread them apart so that they are only slightly in front of your body (at about a 30-degree angle to the front of your body). Point your thumbs toward the ceiling. Hold for 2 seconds and lower your arms slowly. Do 2 sets of 15. Progress to holding a soup can or light weight when you are doing the exercise and increase the weight as the exercise gets easier.    Side-lying external rotation: Lie on your uninjured side with your injured arm at your side and your elbow bent 90 degrees. Keeping your elbow against your side, raise your forearm toward the ceiling and hold for 2 seconds. Slowly lower your arm. Do 2 sets of 15. You can start doing this exercise holding a soup can or light weight and gradually increase the weight as long as there is no pain.    Horizontal abduction: Lie on your stomach on a table or the edge of a bed with the arm on your injured side hanging down over the edge. Raise your arm out to the side, with your thumb pointed toward the ceiling, until your arm is parallel to the floor. Hold for 2 seconds and then lower it slowly. Start this exercise with no weight. As you get stronger, add a light weight or hold a soup can. Do 2 sets of 15.    Push-up with a plus: Begin on the floor on your hands and knees. Keep your hands a shoulder width apart and lift your feet off the floor. Arch your back as high as possible and round your shoulders (this is the \"plus\" part or the exercise). Bend your elbows and lower your body to the floor. Return to the starting position and arch your back again. Do 2 sets of 15.            "

## 2022-11-16 NOTE — LETTER
11/16/2022         RE: Earnestine Mcpherson  [address] [facility] [address]        Dear Colleague,    Thank you for referring your patient, Earnestine Mcpherson, to the [facility] [place]. Please see a copy of my visit note below.    CHIEF COMPLAINT:  Pain of the Left Hip and Pain of the Left Shoulder     HISTORY OF PRESENT ILLNESS  Ms. Mcpherson is a pleasant 57 year old right hand dominant female who presents to clinic today with left shoulder pain.  Earnestine explains that her shoulder pain started after she received flu shot and COVID booster to left shoulder.     Location: left shoulder  Quality:  aching  Duration: 2-3 months   Severity: 4/10 at worst  Timing:constant  Modifying factors:  resting and non-use makes it better, movement and use makes it worse  Associated signs & symptoms: pain that worsens with motions above shoulder height and with shoulder abduction  Previous similar pain: No  Treatments to date: ibuprofen as needed    Patient also has complaints of left hip pain ongoing 6 months with no known injury. Has seen PT and is working on HEP. Pain is intermittent, primarily with activity. Lateral sided hip pain.    Additional history: as documented    Review of Systems:    Have you recently had a a fever, chills, weight loss? No    Do you have any vision problems? No    Do you have any chest pain or edema? No    Do you have any shortness of breath or wheezing?  No    Do you have stomach problems? No    Do you have any numbness or focal weakness? No    Do you have diabetes? No    Do you have problems with bleeding or clotting? No    Do you have an rashes or other skin lesions? No    MEDICAL HISTORY  Patient Active Problem List   Diagnosis     Hypothyroidism with goiter     Family history of colon cancer     Non-toxic multinodular goiter     Hip pain, left       Current Outpatient Medications   Medication Sig Dispense Refill     acetaminophen (TYLENOL) 500 MG tablet  "Take 2 tablets (1,000 mg) by mouth every 6 hours as needed for pain       ferrous sulfate (FEROSUL) 325 (65 Fe) MG tablet        ibuprofen (ADVIL/MOTRIN) 200 MG tablet Take 3 tablets (600 mg) by mouth every 6 hours as needed for moderate pain       levothyroxine (SYNTHROID/LEVOTHROID) 88 MCG tablet Take 1 tablet (88 mcg) by mouth daily 90 tablet 3     vitamin C (ASCORBIC ACID) 100 MG tablet          Allergies   Allergen Reactions     Penicillins      Breathing problems       Family History   Problem Relation Age of Onset     Hypertension Mother      Arthritis Mother      Glaucoma Mother      Macular Degeneration Mother      Diabetes Father 80        thin habitus     Hypothyroidism Sister      Cancer Sister         sublingual cancer (salivary?), surgery, no chemo     Hypothyroidism Sister      Colon Polyps Brother      Diabetes Brother 40        thin habitus     Glaucoma Maternal Grandmother      Colon Cancer Paternal Grandfather 65     Crohn's Disease Daughter 21     Liver Cancer Paternal Aunt      Colon Cancer Other 50        paternal side       Additional medical/Social/Surgical histories reviewed in [facility] and updated as appropriate.       PHYSICAL EXAM  /70   Ht 1.702 m (5' 7\")   Wt 68.9 kg (152 lb)   BMI 23.81 kg/m      General  - normal appearance, in no obvious distress  Musculoskeletal - Left shoulder  - inspection: normal bone and joint alignment, no obvious deformity, no scapular winging, no AC step-off  - palpation: mildly tender RC insertion, normal clavicle, non-tender AC  - ROM:  painful terminal flexion and ER at end range, mild restriction.  - strength: 5/5  strength, 5/5 in all shoulder planes except 4+/5 supraspinatus strength  - special tests:  (-) Speed's  (+) Neer  (+) Hawkin's  (+) Yulia's  (-) Luttrell's  (-) apprehension  (-) subscap lift-off  Neuro  - no sensory or motor deficit, grossly normal coordination, normal muscle tone  Musculoskeletal - left hip  - stance: normal gait " without limp  - inspection: no swelling or effusion, normal bone and joint alignment, no obvious deformity  - palpation: tender over the greater trochanter laterally. No anterior hip tenderness. No pain of buttock region.  - ROM: pain with active abduction, normal and painless flexion, extension, IR, ER  - strength: 5/5 in all planes  - special tests:  (-) SHABANA  (-) FADIR  Neuro  - no sensory or motor deficit, grossly normal coordination, normal muscle tone      IMAGING :XR left hip 2V and left shoulder 4V. Final results and radiologist's interpretation, available in the [facility] health record. Images were reviewed with the patient/family members in the office today. My personal interpretation of the performed imaging is no acute osseous abnormality or significant degenerative changes of joint.     ASSESSMENT & PLAN  Ms. Mcpherson is a 57 year old year old female who presents to clinic today with chronic left hip and more acute left shoulder pain.    Xrays have adequately ruled  Out possibility of osteoarthritis as cause.  Suspected gluteal tendinopathy/bursitis and left shoulder rotator cuff tendinopathy.    Diagnosis:  1. Acute pain of left shoulder  2. Greater trochanteric pain syndrome left hip    Start PT for left shoulder and HEP was provided  Hip with only mild response to PT thus far, offered corticosteroid injection but Earnestine notes pain is not significant enough to proceed  Recommend to continue stretching/strengthening hip and may consider CSI at a later date  If shoulder persists will consider MRI imaging  Reassured lack of any DJD on radiographs today  Follow up PRN    45 minutes on date of the encounter doing chart review, history and examination, independent imaging review, documentation, and additional activities noted above.      It was a pleasure seeing Earnestine today.    Jordon Escudero DO, [facility]  Primary Care Sports Medicine      Again, thank you for allowing me to participate in the care of your patient.         Sincerely,        Jordon Escudero, DO

## 2022-11-28 ENCOUNTER — OFFICE VISIT (OUTPATIENT)
Dept: FAMILY MEDICINE | Facility: CLINIC | Age: 57
End: 2022-11-28
Payer: COMMERCIAL

## 2022-11-28 VITALS
TEMPERATURE: 97.6 F | WEIGHT: 157 LBS | RESPIRATION RATE: 15 BRPM | DIASTOLIC BLOOD PRESSURE: 83 MMHG | HEART RATE: 77 BPM | OXYGEN SATURATION: 100 % | SYSTOLIC BLOOD PRESSURE: 120 MMHG | BODY MASS INDEX: 24.59 KG/M2

## 2022-11-28 DIAGNOSIS — M75.102 ROTATOR CUFF SYNDROME, LEFT: Primary | ICD-10-CM

## 2022-11-28 NOTE — PATIENT INSTRUCTIONS
ASSESSMENT/PLAN:    LEFT shoulder pain that appears to be due to rotator cuff tendinopathy.    - Reviewed x-rays  - Discussed the reasoning for the above diagnosis  - Recommend physical therapy. Referral written.  - If no improvement after at least 4 sessions, let us know. Next step may be further imaging.     --Homero Kraft MD

## 2022-11-28 NOTE — NURSING NOTE
57 year old  Chief Complaint   Patient presents with     Musculoskeletal Problem     X 2 months, left shoulder, worse with raising arm or rotation,        Blood pressure 120/83, pulse 77, temperature 97.6  F (36.4  C), temperature source Skin, resp. rate 15, weight 71.2 kg (157 lb), SpO2 100 %, not currently breastfeeding. Body mass index is 24.59 kg/m .  Patient Active Problem List   Diagnosis     Hypothyroidism with goiter     Family history of colon cancer     Non-toxic multinodular goiter     Hip pain, left       Wt Readings from Last 2 Encounters:   11/28/22 71.2 kg (157 lb)   11/16/22 68.9 kg (152 lb)     BP Readings from Last 3 Encounters:   11/28/22 120/83   11/16/22 102/70   11/03/22 108/68         Current Outpatient Medications   Medication     acetaminophen (TYLENOL) 500 MG tablet     ferrous sulfate (FEROSUL) 325 (65 Fe) MG tablet     ibuprofen (ADVIL/MOTRIN) 200 MG tablet     levothyroxine (SYNTHROID/LEVOTHROID) 88 MCG tablet     vitamin C (ASCORBIC ACID) 100 MG tablet     diclofenac (VOLTAREN) 75 MG EC tablet     No current facility-administered medications for this visit.       Social History     Tobacco Use     Smoking status: Never     Smokeless tobacco: Never   Vaping Use     Vaping Use: Never used   Substance Use Topics     Alcohol use: Yes     Comment: 7 drinks a week     Drug use: Never       Health Maintenance Due   Topic Date Due     ADVANCE CARE PLANNING  Never done     HEPATITIS B IMMUNIZATION (1 of 3 - 3-dose series) Never done     HIV SCREENING  Never done     HEPATITIS C SCREENING  Never done     LIPID  Never done     YEARLY PREVENTIVE VISIT  09/18/2020     COVID-19 Vaccine (5 - Booster for Moderna series) 07/22/2022     HPV TEST  09/19/2022     PAP  09/19/2022     DTAP/TDAP/TD IMMUNIZATION (3 - Td or Tdap) 11/13/2022       No results found for: PAP      November 28, 2022 3:36 PM

## 2022-11-28 NOTE — PROGRESS NOTES
Medical assistant intake:  Earnestine Mcpherson is a 57 year old female who presents to [facility] today for Musculoskeletal Problem (X 2 months, left shoulder, worse with raising arm or rotation, )        ASSESSMENT/PLAN:    1. LEFT shoulder pain that appears to be due to rotator cuff tendinopathy.    - Reviewed x-rays  - Discussed the reasoning for the above diagnosis  - Recommend physical therapy. Referral written.  - If no improvement after at least 4 sessions, let us know. Next step may be further imaging.     --Homero Kraft MD      SUBJECTIVE:   This is my first time meeting Earnestine.  She is a 57-year-old who is here with left shoulder pain for the past 2 months.  The pain occurs with elevation.  It is hard to put on shirts.  It is bothersome at night.  She has trouble sleeping on that side.  Rates the pain is approximately 3/10.    She was seen at the primary care sports medicine clinic on the [facility] and diagnosed with rotator cuff tendinopathy.  She is here today for second opinion.  She has not had physical therapy.  Did have x-rays taken previously.    Review Of Systems:  With left hip pain.  Has otherwise been in usual state of health, e.g.   Cardiovascular: negative  Respiratory: No shortness of breath, dyspnea on exertion, cough, or hemoptysis  Gastrointestinal: negative  Genitourinary: negative    Problem list per EMR:  Patient Active Problem List   Diagnosis     Hypothyroidism with goiter     Family history of colon cancer     Non-toxic multinodular goiter     Hip pain, left       Current Outpatient Medications   Medication Sig Dispense Refill     acetaminophen (TYLENOL) 500 MG tablet Take 2 tablets (1,000 mg) by mouth every 6 hours as needed for pain       ferrous sulfate (FEROSUL) 325 (65 Fe) MG tablet        ibuprofen (ADVIL/MOTRIN) 200 MG tablet Take 3 tablets (600 mg) by mouth every 6 hours as needed for moderate pain       levothyroxine (SYNTHROID/LEVOTHROID) 88 MCG tablet  Take 1 tablet (88 mcg) by mouth daily 90 tablet 3     vitamin C (ASCORBIC ACID) 100 MG tablet        diclofenac (VOLTAREN) 75 MG EC tablet Take 1 tablet (75 mg) by mouth 2 times daily (Patient not taking: Reported on 11/28/2022) 20 tablet 0       Allergies   Allergen Reactions     Penicillins      Breathing problems        Social:   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       OBJECTIVE    Vitals: /83 (BP Location: Right arm, Patient Position: Sitting, Cuff Size: Adult Regular)   Pulse 77   Temp 97.6  F (36.4  C) (Skin)   Resp 15   Wt 71.2 kg (157 lb)   SpO2 100%   BMI 24.59 kg/m    BMI= Body mass index is 24.59 kg/m .  Well-appearing and in no distress.  Rises from a seated position without difficulty.  Her left shoulder has no redness warmth or deformity.  Area of discomfort is over the proximal aspect of the lateral deltoid.  Range of motion with AB duction to approximately 165 degrees but there is notable shoulder shrugging when she is moving herself in through the arc of abduction.  Internal rotation is to approximately T8 and external range of motion is to approximately 80 degrees.  Rotator cuff strength testing is with some limitations due to pain when testing the supraspinatus and infraspinatus.  I would grade both of these at approximately 4+ to 5-/5.  Subscapularis appears intact.  Biceps tendon appears intact.  Reeves impingement test is positive.  Neck had full range of motion without any radiculopathy.    X-rays of the shoulder taken on November 16 were reviewed and overall normal.    SEE TOP OF NOTE FOR ASSESSMENT AND PLAN    --Homero Kraft MD  [facility], Department of Family Medicine and Community Health

## 2022-11-30 ENCOUNTER — THERAPY VISIT (OUTPATIENT)
Dept: PHYSICAL THERAPY | Facility: CLINIC | Age: 57
End: 2022-11-30
Payer: COMMERCIAL

## 2022-11-30 DIAGNOSIS — M25.512 SHOULDER PAIN, LEFT: ICD-10-CM

## 2022-11-30 PROCEDURE — 97110 THERAPEUTIC EXERCISES: CPT | Mod: GP | Performed by: PHYSICAL THERAPIST

## 2022-11-30 PROCEDURE — 97161 PT EVAL LOW COMPLEX 20 MIN: CPT | Mod: GP | Performed by: PHYSICAL THERAPIST

## 2022-11-30 NOTE — PROGRESS NOTES
Physical Therapy Initial Evaluation  Subjective:  The history is provided by the patient. No  was used.   Patient Health History  Earnestine Mcpherson being seen for Left shoukder pain.     Problem began: 9/1/2022.   Problem occurred: Unknown   Pain is reported as 3/10 and 4/10 on pain scale.  General health as reported by patient is good.     Red flags:  None as reported by patient.  Medical allergies: none.                                Therapist Generated HPI Evaluation  Problem details: Insidious onset of shoulder pain for a few months. No arthritis present noted with x-ray.  Has been diagnosed with a rotator cuff injury, tendinopathy.  Pain with raising shoulder to the side and feels weak.  In strength training has had to reduce weights. Does this 2 times a week.  Pain with taking off bra.  Pain with closing the door of the car. Unable to lie on the shoulder at night.  X-ray was normal.  Right handed. No pain at work.  Recently had thyroid surgery..         Type of problem:  Left shoulder.    This is a new condition.  Condition occurred with:  Unknown cause.  Where condition occurred: at home.  Patient reports pain:  Lateral.  Pain is described as aching and sharp and is intermittent.    Since onset symptoms are unchanged.  Associated symptoms:  Loss of motion/stiffness and loss of strength. Symptoms are exacerbated by certain positions and lifting  and relieved by rest.  Special tests included:  X-ray.    Restrictions due to condition include:  Working in normal job without restrictions.                          Objective:  System                   Shoulder Evaluation:  ROM:  AROM:    Flexion:  Left:  165, with pain pain from 90 degrees on    Right:  170    Abduction:  Left: 165   Right:  170      External Rotation:  Left:  70    Right:  75            Extension/Internal Rotation:  Left:  T3    Right:  T3    PROM:    Flexion:  Left:  170 with end range pain                External Rotation:   Left:  90                        Strength:        Abduction:  Left: 4/5  Weak/painful  Pain:    Right: 5/5   Strong/pain free    Pain:  Adduction:  Left: 5/5   Strong/pain free   Pain:    Right: 5/5   Strong/pain free    Pain:  Internal Rotation:  Left:5/5   Strong/pain free    Pain:    Right: 5/5   Strong/pain free    Pain:  External Rotation:   Left:5/5   Strong/pain free    Pain:   Right:5/5   Strong/pain free    Pain:            Stability Testing:  normal      Special Tests:    Left shoulder positive for the following special tests:  Impingement    Palpation:    Left shoulder tenderness present at:  Supraspinatus    Mobility Tests:      Glenohumeral posterior left:  Hypomobile                                               General     ROS    Assessment/Plan:    Patient is a 57 year old female with left side shoulder complaints.    Patient has the following significant findings with corresponding treatment plan.                Diagnosis 1:  Left shoulder pain  Pain -  manual therapy, self management and education  Decreased ROM/flexibility - manual therapy, therapeutic exercise and home program  Decreased joint mobility - manual therapy, therapeutic exercise and home program  Decreased strength - therapeutic exercise, therapeutic activities and home program  Decreased function - therapeutic activities and home program    Therapy Evaluation Codes:   1) History comprised of:   Personal factors that impact the plan of care:      None.    Comorbidity factors that impact the plan of care are:      None.     Medications impacting care: None.  2) Examination of Body Systems comprised of:   Body structures and functions that impact the plan of care:      Shoulder.   Activity limitations that impact the plan of care are:      Bathing, Dressing, Lifting and Sports.  3) Clinical presentation characteristics are:   Stable/Uncomplicated.  4) Decision-Making    Low complexity using standardized patient assessment instrument  and/or measureable assessment of functional outcome.  Cumulative Therapy Evaluation is: Low complexity.    Previous and current functional limitations:  (See Goal Flow Sheet for this information)    Short term and Long term goals: (See Goal Flow Sheet for this information)     Communication ability:  Patient appears to be able to clearly communicate and understand verbal and written communication and follow directions correctly.  Treatment Explanation - The following has been discussed with the patient:   RX ordered/plan of care  Anticipated outcomes  Possible risks and side effects  This patient would benefit from PT intervention to resume normal activities.   Rehab potential is excellent.    Frequency:  2 X a month, once daily  Duration:  for 2 months  Discharge Plan:  Achieve all LTG.  Independent in home treatment program.  Reach maximal therapeutic benefit.    Please refer to the daily flowsheet for treatment today, total treatment time and time spent performing 1:1 timed codes.

## 2022-12-27 ASSESSMENT — ENCOUNTER SYMPTOMS
BREAST MASS: 0
MYALGIAS: 1

## 2022-12-28 ENCOUNTER — OFFICE VISIT (OUTPATIENT)
Dept: FAMILY MEDICINE | Facility: CLINIC | Age: 57
End: 2022-12-28
Payer: COMMERCIAL

## 2022-12-28 VITALS
HEIGHT: 67 IN | SYSTOLIC BLOOD PRESSURE: 120 MMHG | HEART RATE: 70 BPM | DIASTOLIC BLOOD PRESSURE: 79 MMHG | RESPIRATION RATE: 15 BRPM | WEIGHT: 156 LBS | BODY MASS INDEX: 24.48 KG/M2 | OXYGEN SATURATION: 97 % | TEMPERATURE: 97.7 F

## 2022-12-28 DIAGNOSIS — Z12.4 SCREENING FOR CERVICAL CANCER: ICD-10-CM

## 2022-12-28 DIAGNOSIS — Z13.220 SCREENING FOR LIPID DISORDERS: ICD-10-CM

## 2022-12-28 DIAGNOSIS — Z12.31 VISIT FOR SCREENING MAMMOGRAM: ICD-10-CM

## 2022-12-28 DIAGNOSIS — Z00.00 ROUTINE GENERAL MEDICAL EXAMINATION AT A HEALTH CARE FACILITY: Primary | ICD-10-CM

## 2022-12-28 DIAGNOSIS — Z23 NEED FOR TDAP VACCINATION: ICD-10-CM

## 2022-12-28 LAB
CHOLEST SERPL-MCNC: 203 MG/DL
ERYTHROCYTE [DISTWIDTH] IN BLOOD BY AUTOMATED COUNT: 12.3 % (ref 10–15)
FERRITIN SERPL-MCNC: 51 NG/ML (ref 11–328)
HCT VFR BLD AUTO: 38.6 % (ref 35–47)
HDLC SERPL-MCNC: 81 MG/DL
HGB BLD-MCNC: 12.9 G/DL (ref 11.7–15.7)
LDLC SERPL CALC-MCNC: 108 MG/DL
MCH RBC QN AUTO: 31.3 PG (ref 26.5–33)
MCHC RBC AUTO-ENTMCNC: 33.4 G/DL (ref 31.5–36.5)
MCV RBC AUTO: 94 FL (ref 78–100)
NONHDLC SERPL-MCNC: 122 MG/DL
PLATELET # BLD AUTO: 259 10E3/UL (ref 150–450)
RBC # BLD AUTO: 4.12 10E6/UL (ref 3.8–5.2)
TRIGL SERPL-MCNC: 72 MG/DL
WBC # BLD AUTO: 6.2 10E3/UL (ref 4–11)

## 2022-12-28 PROCEDURE — G0123 SCREEN CERV/VAG THIN LAYER: HCPCS | Performed by: INTERNAL MEDICINE

## 2022-12-28 PROCEDURE — 80061 LIPID PANEL: CPT | Performed by: INTERNAL MEDICINE

## 2022-12-28 PROCEDURE — 82728 ASSAY OF FERRITIN: CPT | Performed by: INTERNAL MEDICINE

## 2022-12-28 RX ORDER — LEVOTHYROXINE SODIUM 100 UG/1
100 TABLET ORAL DAILY
COMMUNITY
Start: 2022-12-12 | End: 2024-01-24

## 2022-12-28 NOTE — PROGRESS NOTES
"Earnestine Mcpherson is a 56 yo woman with hx of hypothyroidism with goiter. She is here for a preventive exam.  She is not fasting. She is  up to date on eye exams and dental visits.     HCM  Advanced Directive: none on file  COVID vaccine series: Due for bivalent booster  Other vaccinations: Flu, Shingrix UTD. Due for TDAP booster  Pap due now  Mammogram: Last completed 4/12/2021  Colonoscopy: Last completed 7/15/2021, due 2026. Family history of colon polyps and colon cancer.   DEXA last completed 10/2019, most negative and valid T-score of -1.7 at the lumbar spine, right femoral neck and right total hip. Due for repeat scan.   HIV and Hep C screening completed through blood donation    Diet: Balanced  Wt Readings from Last 4 Encounters:   12/28/22 70.8 kg (156 lb)   11/28/22 71.2 kg (157 lb)   11/16/22 68.9 kg (152 lb)   11/03/22 68.9 kg (152 lb)     Body mass index is 24.63 kg/m .     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 Benign 5 cm cystic nodule.\" She had her post-op visit with general surgery on 11/3 and was doing well. 2 weeks ago, she followed with Dr. Renata Kamara in endocrinology and her levothyroxine was increased from 88 mcg daily to 100 mcg daily. She is due for a TSH check in 3 months. She was also instructed to get yearly follow up ultrasounds for her NIFTP diagnosis. Today, she reports a feeling of fullness and a little bit of pressure in her throat. She is unsure if this is remaining swelling from her surgery. Denies throat or neck pain.       History of Low Hemoglobin  Earnestine reports that she was rejected for donating blood due to a low hemoglobin level in the past. She donates blood about 4x/year. She takes ferrous sulfate 325 (65 Fe) mg daily.      Left Shoulder Pain  Earnestine has followed " "with Dr. Kraft for left shoulder pain. She was referred to PT for this and has been following up with them. She notes that she has left rotator cuff degradation without any triggering injury. She has not been heating or icing her shoulder, but has been trying to rest it outside of PT. She states that her left arm is currently \"kind of useless,\" and notes that she cannot use it to close a car door.       PMH, PSH, FH, medications, allergies and immunizations are reviewed/ updated this visit.      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  no  STD hx?  none  Vasomotor sx:  no hot flashes, no vaginal bleeding  Contraception: menopausal  G 3   P 3   A 0  Self Breast exam:  yes    Current Outpatient Medications   Medication Sig Dispense Refill     acetaminophen (TYLENOL) 500 MG tablet Take 2 tablets (1,000 mg) by mouth every 6 hours as needed for pain       ferrous sulfate (FEROSUL) 325 (65 Fe) MG tablet        levothyroxine (SYNTHROID/LEVOTHROID) 100 MCG tablet Take 100 mcg by mouth daily       vitamin C (ASCORBIC ACID) 100 MG tablet        Allergies   Allergen Reactions     Penicillins      Breathing problems     ROS  CONSTITUTIONAL:NEGATIVE for fever, chills, change in weight  INTEGUMENTARY/SKIN: NEGATIVE for worrisome rashes, moles or lesions  EYES: NEGATIVE for vision changes or irritation  ENT/MOUTH: NEGATIVE for ear, mouth and throat problems  RESP:NEGATIVE for significant cough or SOB  BREAST: NEGATIVE for masses, tenderness or discharge  CV: NEGATIVE for chest pain, palpitations, FORBES, orthopnea, PND  or peripheral edema  GI: NEGATIVE for nausea, abdominal pain, heartburn, or change in bowel " "habits  :NEGATIVE for frequency, dysuria, or hematuria  MUSCULOSKELETAL:NEGATIVE for significant arthralgias, + myalgia in left shoulder, rotator cuff degradation  NEURO: NEGATIVE for weakness, dizziness or paresthesias  ENDOCRINE: NEGATIVE for polyuria/dipsia,  temperature intolerance, skin/hair changes, + hx of goiter, s/p surgery, followed by endocrinologist  HEME/ALLERGY/IMMUNE: NEGATIVE for bleeding problems  PSYCHIATRIC: NEGATIVE for changes in mood or affect    EXAM  /79 (BP Location: Right arm, Patient Position: Sitting, Cuff Size: Adult Regular)   Pulse 70   Temp 97.7  F (36.5  C) (Skin)   Resp 15   Ht 1.695 m (5' 6.73\")   Wt 70.8 kg (156 lb)   SpO2 97%   BMI 24.63 kg/m      GENERAL APPEARANCE: Alert, pleasant, NAD  EYES: PERRL, EOMI, conjunctiva clear  HENT: TM normal bilaterally. Nose and mouth masked  NECK: no adenopathy, well healed surgical incision at base of neck  RESP: lungs clear to auscultation bilaterally  BREAST: normal without masses, no tenderness or nipple discharge and no palpable  axillary masses or adenopathy  CV: regular rate and rhythm, normal S1 S2, no murmur, no carotid bruits  ABDOMEN: soft, nontender, without HSM or masses. Bowel sounds normal  : External genitalia without lesions, cervix normal, pap easily obtained, no abnormal discharge, bimanual exam without adnexal masses or tenderness, uterus non enlarged  MS: Tenderness over the upper left lateral deltoid.   Tenderness over the left lateral hip (bursa)  SKIN: no suspicious lesions or rashes  NEURO: Normal strength and tone, sensory exam grossly normal, DTR normoreflexive in upper and lower extremities  PSYCH: mentation appears normal. and affect normal/bright.  EXT: no peripheral edema, pedal pulses palpable    Assessment:  (Z00.00) Routine general medical examination at a health care facility  (primary encounter diagnosis)  Comment: Healthy 57 year old woman.   Plan: CBC with Platelets, Ferritin           " Today we discussed ways to maintain a healthy lifestyle with sensible eating, regular exercise and self cares. We dicussed calcium and Vitamin D intake, vaccinations and preventive health screens.      (Z12.4) Screening for cervical cancer  Comment: Routine screening.   Plan: PAP screen with HPV - recommended age 30 - 65         years  Addendum: normal pap and neg HPV , repeat in 5 yr          (Z13.220) Screening for lipid disorders  Comment: Not fasting. The 10-year ASCVD risk score (Dennis DK, et al., 2019) is: 1.8%  Recent Labs   Lab Test 12/28/22  1428   CHOL 203*   HDL 81   *   TRIG 72   Plan: Lipid Profile        Does not need lipid lowering medication    (Z23) Need for Tdap vaccination  Comment: Routine vaccination.   Plan: TDAP VACCINE (Adacel, Boostrix)  [[id number]]        Administered today.     (Z12.31) Visit for screening mammogram  Comment: Routine screening. Normal clinical exam  Plan: MA Screen Bilateral w/José Luis         Trish Aguilar MD  Internal Medicine/Pediatrics      I, Cheri Lyons, am serving as a scribe to document services personally performed by Dr. Trish Aguilar, based on data collection and the provider's statements to me. Dr. Aguilar has reviewed, edited, and approved the above note.       Answers for HPI/ROS submitted by the patient on 12/27/2022  Frequency of exercise:: 2-3 days/week  Getting at least 3 servings of Calcium per day:: Yes  Diet:: Regular (no restrictions)  Taking medications regularly:: Yes  Medication side effects:: Not applicable  Bi-annual eye exam:: Yes  Dental care twice a year:: Yes  Sleep apnea or symptoms of sleep apnea:: None  myalgias: Yes  pelvic pain: No  vaginal bleeding: No  vaginal discharge: No  tenderness: No  breast mass: No  breast discharge: No  Additional concerns today:: Yes  Duration of exercise:: 15-30 minutes

## 2022-12-28 NOTE — NURSING NOTE
"57 year old  Chief Complaint   Patient presents with     Physical     Due for pap       Blood pressure 120/79, pulse 70, temperature 97.7  F (36.5  C), temperature source Skin, resp. rate 15, height 1.695 m (5' 6.73\"), weight 70.8 kg (156 lb), SpO2 97 %, not currently breastfeeding. Body mass index is 24.63 kg/m .  Patient Active Problem List   Diagnosis     Hypothyroidism with goiter     Family history of colon cancer     Non-toxic multinodular goiter     Hip pain, left     Shoulder pain, left       Wt Readings from Last 2 Encounters:   12/28/22 70.8 kg (156 lb)   11/28/22 71.2 kg (157 lb)     BP Readings from Last 3 Encounters:   12/28/22 120/79   11/28/22 120/83   11/16/22 102/70         Current Outpatient Medications   Medication     acetaminophen (TYLENOL) 500 MG tablet     ferrous sulfate (FEROSUL) 325 (65 Fe) MG tablet     levothyroxine (SYNTHROID/LEVOTHROID) 100 MCG tablet     vitamin C (ASCORBIC ACID) 100 MG tablet     levothyroxine (SYNTHROID/LEVOTHROID) 88 MCG tablet     No current facility-administered medications for this visit.       Social History     Tobacco Use     Smoking status: Never     Smokeless tobacco: Never   Vaping Use     Vaping Use: Never used   Substance Use Topics     Alcohol use: Yes     Comment: 7 drinks a week     Drug use: Never       Health Maintenance Due   Topic Date Due     ADVANCE CARE PLANNING  Never done     HEPATITIS B IMMUNIZATION (1 of 3 - 3-dose series) Never done     HIV SCREENING  Never done     HEPATITIS C SCREENING  Never done     LIPID  Never done     COVID-19 Vaccine (5 - Booster for Moderna series) 07/22/2022     HPV TEST  09/19/2022     PAP  09/19/2022     DTAP/TDAP/TD IMMUNIZATION (4 - Td or Tdap) 11/13/2022       No results found for: PAP      December 28, 2022 1:33 PM    "

## 2022-12-28 NOTE — NURSING NOTE
Prior to immunization administration, verified patients identity using patient s name and date of birth. Please see Immunization Activity for additional information.     Screening Questionnaire for Adult Immunization    Are you sick today?   No   Do you have allergies to medications, food, a vaccine component or latex?   No   Have you ever had a serious reaction after receiving a vaccination?   No   Do you have a long-term health problem with heart, lung, kidney, or metabolic disease (e.g., diabetes), asthma, a blood disorder, no spleen, complement component deficiency, a cochlear implant, or a spinal fluid leak?  Are you on long-term aspirin therapy?   No   Do you have cancer, leukemia, HIV/AIDS, or any other immune system problem?   No   Do you have a parent, brother, or sister with an immune system problem?   No   In the past 3 months, have you taken medications that affect  your immune system, such as prednisone, other steroids, or anticancer drugs; drugs for the treatment of rheumatoid arthritis, Crohn s disease, or psoriasis; or have you had radiation treatments?   No   Have you had a seizure, or a brain or other nervous system problem?   No   During the past year, have you received a transfusion of blood or blood    products, or been given immune (gamma) globulin or antiviral drug?   No   For women: Are you pregnant or is there a chance you could become       pregnant during the next month?   No   Have you received any vaccinations in the past 4 weeks?   No     Immunization questionnaire answers were all negative.        Per orders of Dr. Aguilar, injection of Tdap given by Yu Tanner MA. Patient instructed to remain in clinic for 15 minutes afterwards, and to report any adverse reaction to me immediately.       Screening performed by Yu Tanner MA on 12/28/2022 at 2:49 PM.

## 2022-12-29 ENCOUNTER — THERAPY VISIT (OUTPATIENT)
Dept: PHYSICAL THERAPY | Facility: CLINIC | Age: 57
End: 2022-12-29
Attending: FAMILY MEDICINE
Payer: COMMERCIAL

## 2022-12-29 DIAGNOSIS — M25.512 SHOULDER PAIN, LEFT: Primary | ICD-10-CM

## 2022-12-29 DIAGNOSIS — M75.102 ROTATOR CUFF SYNDROME, LEFT: ICD-10-CM

## 2022-12-29 PROCEDURE — 97110 THERAPEUTIC EXERCISES: CPT | Mod: GP | Performed by: PHYSICAL THERAPIST

## 2022-12-29 PROCEDURE — 97530 THERAPEUTIC ACTIVITIES: CPT | Mod: GP | Performed by: PHYSICAL THERAPIST

## 2023-01-02 LAB
BKR LAB AP GYN ADEQUACY: NORMAL
BKR LAB AP GYN INTERPRETATION: NORMAL
BKR LAB AP HPV REFLEX: NORMAL
BKR LAB AP LMP: NORMAL
BKR LAB AP PREVIOUS ABNORMAL: NORMAL
PATH REPORT.COMMENTS IMP SPEC: NORMAL
PATH REPORT.COMMENTS IMP SPEC: NORMAL
PATH REPORT.RELEVANT HX SPEC: NORMAL

## 2023-01-03 LAB
HUMAN PAPILLOMA VIRUS 16 DNA: NEGATIVE
HUMAN PAPILLOMA VIRUS 18 DNA: NEGATIVE
HUMAN PAPILLOMA VIRUS FINAL DIAGNOSIS: NORMAL
HUMAN PAPILLOMA VIRUS OTHER HR: NEGATIVE

## 2023-01-16 ENCOUNTER — THERAPY VISIT (OUTPATIENT)
Dept: PHYSICAL THERAPY | Facility: CLINIC | Age: 58
End: 2023-01-16
Payer: COMMERCIAL

## 2023-01-16 DIAGNOSIS — M25.512 SHOULDER PAIN, LEFT: Primary | ICD-10-CM

## 2023-01-16 PROCEDURE — 97110 THERAPEUTIC EXERCISES: CPT | Mod: 59 | Performed by: PHYSICAL THERAPIST

## 2023-01-16 PROCEDURE — 97530 THERAPEUTIC ACTIVITIES: CPT | Mod: GP | Performed by: PHYSICAL THERAPIST

## 2023-02-06 ENCOUNTER — THERAPY VISIT (OUTPATIENT)
Dept: PHYSICAL THERAPY | Facility: CLINIC | Age: 58
End: 2023-02-06
Payer: COMMERCIAL

## 2023-02-06 DIAGNOSIS — M25.512 LEFT SHOULDER PAIN, UNSPECIFIED CHRONICITY: Primary | ICD-10-CM

## 2023-02-06 PROCEDURE — 97110 THERAPEUTIC EXERCISES: CPT | Mod: GP | Performed by: PHYSICAL THERAPIST

## 2023-03-09 ENCOUNTER — LAB (OUTPATIENT)
Dept: LAB | Facility: CLINIC | Age: 58
End: 2023-03-09
Payer: COMMERCIAL

## 2023-03-09 DIAGNOSIS — E03.9 HYPOTHYROIDISM WITH GOITER: ICD-10-CM

## 2023-03-09 DIAGNOSIS — E04.9 HYPOTHYROIDISM WITH GOITER: ICD-10-CM

## 2023-03-09 LAB
T4 FREE SERPL-MCNC: 1.1 NG/DL (ref 0.9–1.7)
TSH SERPL DL<=0.005 MIU/L-ACNC: 10.2 UIU/ML (ref 0.3–4.2)

## 2023-03-09 PROCEDURE — 84439 ASSAY OF FREE THYROXINE: CPT

## 2023-03-09 PROCEDURE — 84443 ASSAY THYROID STIM HORMONE: CPT

## 2023-03-31 NOTE — PROGRESS NOTES
Subjective:  HPI  Physical Exam                    Objective:  System    Physical Exam    General     ROS    Assessment/Plan:    DISCHARGE REPORT    Progress reporting period is from 11/30/2022 to 2/6/2023.       SUBJECTIVE  Subjective changes noted by patient:  .  Subjective: Did some nordic skiing.  Some pain with quick jerking movement.  Has still not gone back to the weight class and not sure that she wants to.    Current pain level is NA  .     Previous pain level was  NA  .   Changes in function:  Yes (See Goal flowsheet attached for changes in current functional level)  Adverse reaction to treatment or activity: None    OBJECTIVE  Changes noted in objective findings:  Yes,   Objective: Still has mild pain at mid range through 90 degrees flexion and abduction. Able to do all exercises with out pain.     ASSESSMENT/PLAN  Updated problem list and treatment plan: Diagnosis 1:  Shoulder pain  Pain -  manual therapy, self management, education and home program  Decreased ROM/flexibility - manual therapy, therapeutic exercise and home program  Decreased joint mobility - manual therapy, therapeutic exercise and home program  Decreased strength - therapeutic exercise, therapeutic activities and home program  Decreased function - therapeutic activities and home program  STG/LTGs have been met or progress has been made towards goals:  Yes (See Goal flow sheet completed today.)  Assessment of Progress: The patient's condition is improving.  Self Management Plans:  Patient has been instructed in a home treatment program.  I have re-evaluated this patient and find that the nature, scope, duration and intensity of the therapy is appropriate for the medical condition of the patient.  Earnestine continues to require the following intervention to meet STG and LTG's:  Patient needs to continue to work on the home exercise program.      Recommendations:  This patient is ready to be discharged from therapy and continue their home  treatment program.    Please refer to the daily flowsheet for treatment today, total treatment time and time spent performing 1:1 timed codes.

## 2023-05-22 ASSESSMENT — PATIENT HEALTH QUESTIONNAIRE - PHQ9
SUM OF ALL RESPONSES TO PHQ QUESTIONS 1-9: 0
SUM OF ALL RESPONSES TO PHQ QUESTIONS 1-9: 0
10. IF YOU CHECKED OFF ANY PROBLEMS, HOW DIFFICULT HAVE THESE PROBLEMS MADE IT FOR YOU TO DO YOUR WORK, TAKE CARE OF THINGS AT HOME, OR GET ALONG WITH OTHER PEOPLE: NOT DIFFICULT AT ALL

## 2023-05-23 ENCOUNTER — OFFICE VISIT (OUTPATIENT)
Dept: FAMILY MEDICINE | Facility: CLINIC | Age: 58
End: 2023-05-23
Payer: COMMERCIAL

## 2023-05-23 VITALS
DIASTOLIC BLOOD PRESSURE: 67 MMHG | WEIGHT: 148 LBS | HEART RATE: 71 BPM | TEMPERATURE: 98.2 F | BODY MASS INDEX: 23.37 KG/M2 | OXYGEN SATURATION: 98 % | RESPIRATION RATE: 18 BRPM | SYSTOLIC BLOOD PRESSURE: 100 MMHG

## 2023-05-23 DIAGNOSIS — N39.0 URINARY TRACT INFECTION WITH HEMATURIA, SITE UNSPECIFIED: ICD-10-CM

## 2023-05-23 DIAGNOSIS — R31.9 URINARY TRACT INFECTION WITH HEMATURIA, SITE UNSPECIFIED: ICD-10-CM

## 2023-05-23 DIAGNOSIS — R39.9 UTI SYMPTOMS: Primary | ICD-10-CM

## 2023-05-23 LAB
ALBUMIN UR-MCNC: 30 MG/DL
APPEARANCE UR: ABNORMAL
BILIRUB UR QL STRIP: NEGATIVE
COLOR UR AUTO: YELLOW
GLUCOSE UR STRIP-MCNC: NEGATIVE MG/DL
HGB UR QL STRIP: ABNORMAL
KETONES UR STRIP-MCNC: NEGATIVE MG/DL
LEUKOCYTE ESTERASE UR QL STRIP: ABNORMAL
NITRATE UR QL: NEGATIVE
PH UR STRIP: 5.5 [PH] (ref 5–7)
SP GR UR STRIP: >=1.03 (ref 1–1.03)
UROBILINOGEN UR STRIP-ACNC: 0.2 E.U./DL

## 2023-05-23 PROCEDURE — 87086 URINE CULTURE/COLONY COUNT: CPT | Performed by: FAMILY MEDICINE

## 2023-05-23 PROCEDURE — 87186 SC STD MICRODIL/AGAR DIL: CPT | Performed by: FAMILY MEDICINE

## 2023-05-23 RX ORDER — NITROFURANTOIN 25; 75 MG/1; MG/1
100 CAPSULE ORAL 2 TIMES DAILY
Qty: 10 CAPSULE | Refills: 0 | Status: SHIPPED | OUTPATIENT
Start: 2023-05-23 | End: 2023-05-28

## 2023-05-23 NOTE — PROGRESS NOTES
Assessment & Plan   Problem List Items Addressed This Visit    None  Visit Diagnoses     UTI symptoms    -  Primary    Relevant Orders    Urinalysis Macroscopic (Completed)    Urinary tract infection with hematuria, site unspecified        Relevant Medications    nitroFURantoin macrocrystal-monohydrate (MACROBID) 100 MG capsule         U/A consistent with likely UTI. Sending for cultures and sensitivities. Starting ABX as noted above.     24 minutes spent on the date of the encounter doing chart review, history and exam, documentation and further activities as noted.    MD JEANNE Maynard [facility]    Demetris Colby is a 57 year old, presenting for the following health issues:  Rule out Urinary Tract Infection (Blood in urine, frequency, burning with voiding. Ongoing for x3 days. )    HPI   Suspicion for UTI  - 3 days of burning and increased urinary frequency  - no previous history of UTIs  - listed allergy to penicillin  - denies fever, chills, nausea, fatigue, flank pain      Review of Systems   Constitutional, HEENT, cardiovascular, pulmonary, gi and gu systems are negative, except as otherwise noted.      Objective    /67 (BP Location: Left arm, Patient Position: Sitting, Cuff Size: Adult Large)   Pulse 71   Temp 98.2  F (36.8  C) (Temporal)   Resp 18   Wt 67.1 kg (148 lb)   SpO2 98%   BMI 23.37 kg/m    Body mass index is 23.37 kg/m .  Physical Exam   GENERAL: healthy, alert and no distress  NECK: no adenopathy, no asymmetry, masses, or scars and thyroid normal to palpation  RESP: lungs clear to auscultation - no rales, rhonchi or wheezes  CV: regular rate and rhythm, normal S1 S2, no S3 or S4, no murmur, click or rub, no peripheral edema and peripheral pulses strong  ABDOMEN: soft, nontender, no hepatosplenomegaly, no masses and bowel sounds normal  MS: no gross musculoskeletal defects noted, no edema

## 2023-05-25 LAB
BACTERIA UR CULT: ABNORMAL
BACTERIA UR CULT: ABNORMAL

## 2023-07-11 ENCOUNTER — ANCILLARY PROCEDURE (OUTPATIENT)
Dept: ULTRASOUND IMAGING | Facility: CLINIC | Age: 58
End: 2023-07-11
Attending: INTERNAL MEDICINE
Payer: COMMERCIAL

## 2023-07-11 DIAGNOSIS — E04.1 THYROID NODULE: ICD-10-CM

## 2023-07-11 DIAGNOSIS — E03.9 HYPOTHYROIDISM: ICD-10-CM

## 2023-07-11 PROCEDURE — 76536 US EXAM OF HEAD AND NECK: CPT | Performed by: RADIOLOGY

## 2023-07-30 ENCOUNTER — HEALTH MAINTENANCE LETTER (OUTPATIENT)
Age: 58
End: 2023-07-30

## 2023-12-06 ENCOUNTER — ANCILLARY PROCEDURE (OUTPATIENT)
Dept: MAMMOGRAPHY | Facility: CLINIC | Age: 58
End: 2023-12-06
Attending: INTERNAL MEDICINE
Payer: COMMERCIAL

## 2023-12-06 DIAGNOSIS — Z12.31 VISIT FOR SCREENING MAMMOGRAM: ICD-10-CM

## 2023-12-06 PROCEDURE — 77067 SCR MAMMO BI INCL CAD: CPT | Mod: TC | Performed by: RADIOLOGY

## 2024-01-01 ENCOUNTER — OFFICE VISIT (OUTPATIENT)
Dept: URGENT CARE | Facility: URGENT CARE | Age: 59
End: 2024-01-01
Payer: COMMERCIAL

## 2024-01-01 VITALS
WEIGHT: 150 LBS | TEMPERATURE: 97.8 F | OXYGEN SATURATION: 98 % | HEIGHT: 67 IN | RESPIRATION RATE: 16 BRPM | DIASTOLIC BLOOD PRESSURE: 64 MMHG | SYSTOLIC BLOOD PRESSURE: 110 MMHG | HEART RATE: 83 BPM | BODY MASS INDEX: 23.54 KG/M2

## 2024-01-01 DIAGNOSIS — R05.1 ACUTE COUGH: ICD-10-CM

## 2024-01-01 DIAGNOSIS — R07.0 THROAT PAIN: Primary | ICD-10-CM

## 2024-01-01 DIAGNOSIS — M79.10 MYALGIA: ICD-10-CM

## 2024-01-01 DIAGNOSIS — R09.81 STUFFY NOSE: ICD-10-CM

## 2024-01-01 LAB
DEPRECATED S PYO AG THROAT QL EIA: NEGATIVE
FLUAV AG SPEC QL IA: NEGATIVE
FLUBV AG SPEC QL IA: NEGATIVE
GROUP A STREP BY PCR: NOT DETECTED

## 2024-01-01 PROCEDURE — 99214 OFFICE O/P EST MOD 30 MIN: CPT | Performed by: STUDENT IN AN ORGANIZED HEALTH CARE EDUCATION/TRAINING PROGRAM

## 2024-01-01 PROCEDURE — 87651 STREP A DNA AMP PROBE: CPT | Performed by: STUDENT IN AN ORGANIZED HEALTH CARE EDUCATION/TRAINING PROGRAM

## 2024-01-01 PROCEDURE — 87804 INFLUENZA ASSAY W/OPTIC: CPT | Performed by: STUDENT IN AN ORGANIZED HEALTH CARE EDUCATION/TRAINING PROGRAM

## 2024-01-01 RX ORDER — BENZONATATE 100 MG/1
100 CAPSULE ORAL 3 TIMES DAILY PRN
Qty: 30 CAPSULE | Refills: 0 | Status: SHIPPED | OUTPATIENT
Start: 2024-01-01 | End: 2024-01-04

## 2024-01-01 RX ORDER — FLUTICASONE PROPIONATE 50 MCG
1 SPRAY, SUSPENSION (ML) NASAL DAILY
Qty: 9.9 ML | Refills: 0 | Status: SHIPPED | OUTPATIENT
Start: 2024-01-01 | End: 2024-01-25

## 2024-01-01 RX ORDER — ACETAMINOPHEN 500 MG
500-1000 TABLET ORAL EVERY 6 HOURS PRN
Qty: 30 TABLET | Refills: 0 | Status: SHIPPED | OUTPATIENT
Start: 2024-01-01 | End: 2024-01-25

## 2024-01-01 RX ORDER — BENZOCAINE AND MENTHOL, UNSPECIFIED FORM 15; 2.3 MG/1; MG/1
1 LOZENGE ORAL
Qty: 16 LOZENGE | Refills: 0 | Status: SHIPPED | OUTPATIENT
Start: 2024-01-01 | End: 2024-01-25

## 2024-01-01 NOTE — PROGRESS NOTES
"chief complaint: URI symptoms     HPI:  Earnestine Mcpherson is a 58 year old female who presents today complaining of 4 days history of cough, runny nose / stuffy nose, sore throat and subjective fever, Has been taking over the counter mucinex, no complete resolution.    History obtained from the patient.    Problem List:  2022-11: Shoulder pain, left  2022-04: Hip pain, left  2021-08: Non-toxic multinodular goiter  2019-12: Hip pain, right  2019-09: Hypothyroidism with goiter  2019-09: Family history of colon cancer      Past Medical History:   Diagnosis Date    Thyroid disease        Social History     Tobacco Use    Smoking status: Never    Smokeless tobacco: Never   Substance Use Topics    Alcohol use: Yes     Comment: 7 drinks a week       Review of systems  ROS negative except for pertinent positives listed in HPI      Vitals:    01/01/24 0924   BP: 110/64   Pulse: 83   Resp: 16   Temp: 97.8  F (36.6  C)   TempSrc: Temporal   SpO2: 98%   Weight: 68 kg (150 lb)   Height: 1.702 m (5' 7\")       Physical Exam  Constitutional: healthy, alert, and no distress  Head: Normocephalic.   Neck: Neck supple. No adenopathy. T  ENT: Mild b/l turbinate hypertrophy on exam.  ENT exam normal, no neck nodes or sinus tenderness  Cardiovascular:  RRR. No murmurs, clicks gallops or rub  Respiratory:unlabored respiratory effort  Gastrointestinal: Abdomen soft, non-tender. BS normal. No masses, organomegaly  Musculoskeletal: extremities normal- no gross deformities noted, gait normal  Psychiatric: mentation appears normal and affect normal/bright      Assessment & Plan     Earnestine was seen today for urgent care and uri.    Diagnoses and all orders for this visit:  Acute cough  Myalgia  Throat pain  Differential diagnosis for cough, fever and throat pain includes but not limited to postnasal drip, viral respiratory infection, streptococcal pharyngitis/tonsillitis, bronchiolitis, asthma exacerbation, e.t.c  Of note most likely cause is " viral respiratory of infection.  Less likely strep throat given negative strep antigen test, cultures are still pending but discussed that if it is positive patient will need treatment if negative no need for further actions at this point or else patient notices worsening symptoms.  Discussed safety precautions return to the ER.  -Conservative measures such as warm water or tea with honey  -Using a steamer, okay to use vicks menthol in the steamer  -As needed ibuprofen or Tylenol for fever  -Push fluids and rest  -Patient aware that her results will be delivered through MyChart, discussed isolation precautions if positive.  -     Influenza A/B antigen  -     Streptococcus A Rapid Screen w/Reflex to PCR - Clinic Collect  -     Group A Streptococcus PCR Throat Swab  -     benzonatate (TESSALON) 100 MG capsule; Take 1 capsule (100 mg) by mouth 3 times daily as needed for cough  -     acetaminophen (TYLENOL) 500 MG tablet; Take 1-2 tablets (500-1,000 mg) by mouth every 6 hours as needed for mild pain  -     Benzocaine-Menthol (CEPACOL) 15-2.3 MG LOZG; Take 1 lozenge by mouth every 2 hours as needed (throat pain)      Stuffy nose  Likely 2/2 to mild to mod turbinate hypertrophy   -     fluticasone (FLONASE) 50 MCG/ACT nasal spray; Spray 1 spray into both nostrils daily      Clinical Decision Making:    At the end of the encounter, I discussed results, diagnosis, medications. Discussed red flags for immediate return to clinic/ER, as well as indications for follow up if no improvement. Patient understood

## 2024-01-01 NOTE — PATIENT INSTRUCTIONS
-Conservative measures such as warm water or tea with honey  -Using a steamer, okay to use vicks menthol in the steamer  -As needed ibuprofen or Tylenol for fever  - Cepacol for sorethroat  -Push fluids and rest  -Patient aware that her results will be delivered through MyChart, discussed isolation precautions if positive.

## 2024-01-04 ENCOUNTER — OFFICE VISIT (OUTPATIENT)
Dept: FAMILY MEDICINE | Facility: CLINIC | Age: 59
End: 2024-01-04
Payer: COMMERCIAL

## 2024-01-04 VITALS
SYSTOLIC BLOOD PRESSURE: 88 MMHG | WEIGHT: 151.5 LBS | OXYGEN SATURATION: 99 % | BODY MASS INDEX: 23.73 KG/M2 | DIASTOLIC BLOOD PRESSURE: 53 MMHG | HEART RATE: 81 BPM | TEMPERATURE: 98.3 F | RESPIRATION RATE: 16 BRPM

## 2024-01-04 DIAGNOSIS — J02.9 PHARYNGITIS, UNSPECIFIED ETIOLOGY: Primary | ICD-10-CM

## 2024-01-04 DIAGNOSIS — H66.002 NON-RECURRENT ACUTE SUPPURATIVE OTITIS MEDIA OF LEFT EAR WITHOUT SPONTANEOUS RUPTURE OF TYMPANIC MEMBRANE: ICD-10-CM

## 2024-01-04 RX ORDER — PREDNISONE 20 MG/1
TABLET ORAL
Qty: 3 TABLET | Refills: 0 | Status: SHIPPED | OUTPATIENT
Start: 2024-01-04 | End: 2024-01-25

## 2024-01-04 RX ORDER — AZITHROMYCIN 250 MG/1
TABLET, FILM COATED ORAL
Qty: 6 TABLET | Refills: 0 | Status: SHIPPED | OUTPATIENT
Start: 2024-01-04 | End: 2024-01-09

## 2024-01-04 NOTE — NURSING NOTE
"58 year old  Chief Complaint   Patient presents with    Ear Problem     Per MyChart msg - day 6 of severe ear pain. urgent care on 1/1 and was negative for strep and influenza and had a negative home COVID test. The doc said my ear drum was bulging but not infected. I'm taking Tylenol cold and flu, flousnase and musinex. The worst symptom is shooting nerve pain behind my ear and pain in my throat when I swallow.       Blood pressure (!) 88/53, pulse 81, temperature 98.3  F (36.8  C), temperature source Oral, resp. rate 16, weight 68.7 kg (151 lb 8 oz), SpO2 99%, not currently breastfeeding. Body mass index is 23.73 kg/m .  Patient Active Problem List   Diagnosis    Hypothyroidism with goiter    Family history of colon cancer    Non-toxic multinodular goiter    Hip pain, left    Shoulder pain, left       Wt Readings from Last 2 Encounters:   01/04/24 68.7 kg (151 lb 8 oz)   01/01/24 68 kg (150 lb)     BP Readings from Last 3 Encounters:   01/04/24 (!) 88/53   01/01/24 110/64   05/23/23 100/67         Current Outpatient Medications   Medication    acetaminophen (TYLENOL) 500 MG tablet    Benzocaine-Menthol (CEPACOL) 15-2.3 MG LOZG    ferrous sulfate (FEROSUL) 325 (65 Fe) MG tablet    fluticasone (FLONASE) 50 MCG/ACT nasal spray    levothyroxine (SYNTHROID/LEVOTHROID) 100 MCG tablet    vitamin C (ASCORBIC ACID) 100 MG tablet    acetaminophen (TYLENOL) 500 MG tablet    benzonatate (TESSALON) 100 MG capsule     No current facility-administered medications for this visit.       Social History     Tobacco Use    Smoking status: Never    Smokeless tobacco: Never   Vaping Use    Vaping Use: Never used   Substance Use Topics    Alcohol use: Yes     Comment: 7 drinks a week    Drug use: Never       Health Maintenance Due   Topic Date Due    ADVANCE CARE PLANNING  Never done    HEPATITIS B IMMUNIZATION (1 of 3 - 3-dose series) Never done    YEARLY PREVENTIVE VISIT  12/28/2023       No results found for: \"PAP\"      January 4, " 2024 2:57 PM

## 2024-01-04 NOTE — PROGRESS NOTES
Earnestine Mcpherson is a 58 year old female with hx of hypothyroidism, left shoulder and left hip pain. She is here for the following issues:       Pharyngitis?Otalgia left  [facility] visit 1/1/24 for 4 d hx of URI symptoms  Spent [place] with daughter who also had similar sx  Had cough, rhinorrhea, stuffy nose, pharyngitis  COVID, flu and strep tests negative  Benzonatate and flonase recommended  Pain over anterior forehead.   Lost voice  Pharyngitis, difficult to eat. Slowly improving,   Shooting pain, stabbing just beneath left ear, every 15 seconds  Taking tylenol cold and flu, mucinex severe congestion and cough  In bed under covers    Patient Active Problem List   Diagnosis    Hypothyroidism with goiter    Family history of colon cancer    Non-toxic multinodular goiter    Hip pain, left    Shoulder pain, left       Current Outpatient Medications   Medication Sig Dispense Refill    acetaminophen (TYLENOL) 500 MG tablet Take 1-2 tablets (500-1,000 mg) by mouth every 6 hours as needed for mild pain 30 tablet 0    acetaminophen (TYLENOL) 500 MG tablet Take 2 tablets (1,000 mg) by mouth every 6 hours as needed for pain      Benzocaine-Menthol (CEPACOL) 15-2.3 MG LOZG Take 1 lozenge by mouth every 2 hours as needed (throat pain) 16 lozenge 0    benzonatate (TESSALON) 100 MG capsule Take 1 capsule (100 mg) by mouth 3 times daily as needed for cough 30 capsule 0    ferrous sulfate (FEROSUL) 325 (65 Fe) MG tablet       fluticasone (FLONASE) 50 MCG/ACT nasal spray Spray 1 spray into both nostrils daily 9.9 mL 0    levothyroxine (SYNTHROID/LEVOTHROID) 100 MCG tablet Take 100 mcg by mouth daily      vitamin C (ASCORBIC ACID) 100 MG tablet          Allergies   Allergen Reactions    Penicillins      Breathing problems        EXAM  BP (!) 88/53 (BP Location: Left arm, Patient Position: Sitting, Cuff Size: Adult Regular)   Pulse 81   Temp 98.3  F (36.8  C) (Oral)   Resp 16   Wt 68.7 kg (151 lb 8 oz)   SpO2 99%   BMI 23.73  kg/m    Gen: appears fatigued, hoarse voice  HEENT:  Conjunctiva nl, R TM normal, L TM with superior thickening, erythema, OP with mild posterior erythema, no exudate  COR: S1,S2, no murmur  Lungs: CTA bilaterally, no rhonchi, wheezes or rales      Assessment:  (J02.9) Pharyngitis, unspecified etiology  (primary encounter diagnosis)  Comment: sore throat in setting of viral illness, likely eustachian tube dysfunction  Plan: predniSONE (DELTASONE) 20 MG tablet        Continue with tylenol cold and sinus medication, stop Mucinex as this overlaps with tylenol ingredients. Fluids, rest. Prednisone x 3 days. Hold NSAIDs while taking prednisone.    (H66.002) Non-recurrent acute suppurative otitis media of left ear without spontaneous rupture of tympanic membrane  Comment: left OM, early, stabbing pain on left side, likely eustachian tube related. Cough but no rhonchi or wheezing, CXR not indicated at this time, will not change managment  Plan: azithromycin (ZITHROMAX) 250 MG tablet,         predniSONE (DELTASONE) 20 MG tablet        Fluids, rest, Zpack, prednisone burst.    Trish Aguilar MD  Internal Medicine/Pediatrics

## 2024-01-17 ASSESSMENT — ENCOUNTER SYMPTOMS
FEVER: 0
DIZZINESS: 0
SORE THROAT: 0
HEADACHES: 0
ABDOMINAL PAIN: 0
NAUSEA: 0
ARTHRALGIAS: 0
WEAKNESS: 0
HEARTBURN: 0
DIARRHEA: 0
NERVOUS/ANXIOUS: 0
COUGH: 1
HEMATOCHEZIA: 0
MYALGIAS: 0
EYE PAIN: 0
FREQUENCY: 0
HEMATURIA: 0
SHORTNESS OF BREATH: 0
JOINT SWELLING: 0
CHILLS: 0
BREAST MASS: 0
PARESTHESIAS: 0
CONSTIPATION: 0
DYSURIA: 0
PALPITATIONS: 0

## 2024-01-23 NOTE — PROGRESS NOTES
Earnestine Mcpherson is a 58 year old female with hx of hypothyroidism, s/p left thyroidectomy, noninvasive follicular pattern with papillary like features, osteopenia. She  is here for a general check up.  She is no fasting. She is up to date on eye exams ( glasses, drooping lids) and dental visits.    HCM  Advanced directive: none on file  COVID vaccine up to date  Other vaccines  flu and others up to date  Colon cancer screening due 2031  Pap completed 2022, normal with neg HPV due 2027  Mammogram completed 12/2023  DEXA done 2019, with mild osteopenia, lowest T -1.7 lumbar spine    Diet: wide variety  Wt Readings from Last 4 Encounters:   01/24/24 69.9 kg (154 lb)   01/04/24 68.7 kg (151 lb 8 oz)   01/01/24 68 kg (150 lb)   05/23/23 67.1 kg (148 lb)     Body mass index is 24.12 kg/m .      Weight gain  Increase of 10 lbs  Cooks at home  Exercise - swimming 2x per week, weights twice a week  A little weight gain since exercising    Status Post Left Thyroid Lobectomy   Followed by endocrinology, Dr. 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      Social  Moved to [place] from [place]  Works at [employer]    with 3 Children, ages 29, 27 and 23  2 daughters, 1 son     HABITS:  Tob: none  ETOH: 1-2/day   Calcium: Dairy in diet, calcium/D supplement   Caffeine: 3 coffees/day  Exercise: 4x/week; walking for 30-60 minutes     OB/GYN  HISTORY:  LMP: 2014  Hx abnormal pap?  no  STD hx?  none  Vasomotor sx:  no hot flashes, no vaginal bleeding  Contraception: menopausal  G 3   P 3   A 0  Self Breast exam:  yes      Current Outpatient Medications   Medication Sig Dispense Refill    acetaminophen (TYLENOL) 500 MG tablet Take 1-2 tablets (500-1,000 mg) by mouth every 6 hours as needed for mild pain 30 tablet 0    Benzocaine-Menthol (CEPACOL) 15-2.3 MG LOZG Take 1 lozenge by mouth every 2 hours as needed (throat pain) 16 lozenge 0    ferrous sulfate (FEROSUL) 325 (65 Fe) MG tablet       fluticasone (FLONASE) 50 MCG/ACT nasal spray Spray 1 spray into both nostrils daily 9.9 mL 0    levothyroxine (SYNTHROID/LEVOTHROID) 100 MCG tablet Take 100 mcg by mouth daily      predniSONE (DELTASONE) 20 MG tablet Take one daily x 3 days 3 tablet 0    vitamin C (ASCORBIC ACID) 100 MG tablet        Allergies   Allergen Reactions    Penicillins      Breathing problems         ROS  CONSTITUTIONAL:NEGATIVE for fever, chills, + 10 lb weight gain  INTEGUMENTARY/SKIN: NEGATIVE for worrisome rashes, moles or lesions  EYES: NEGATIVE for vision changes or irritation  ENT/MOUTH: NEGATIVE for ear, mouth and throat problems + hearing concerns  RESP:NEGATIVE for significant cough or SOB  CV: NEGATIVE for chest pain, palpitations, FORBES, orthopnea, PND  or peripheral edema  GI: NEGATIVE for nausea, abdominal pain, heartburn, or change in bowel habits  :NEGATIVE for frequency, dysuria, or hematuria  MUSCULOSKELETAL:NEGATIVE for significant arthralgias or myalgia  NEURO: NEGATIVE for weakness, dizziness or paresthesias  ENDOCRINE: NEGATIVE for polyuria/dipsia,  temperature intolerance, skin/hair changes, partial thyroidectomy, 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 R>L  HENT: TM normal bilaterally. Nose and mouth without lesions  NECK: no adenopathy, mild prominence of right thyroid lobe, nontender, absent L lobe  RESP: lungs clear to auscultation bilaterally  BREAST: deferred  CV: regular rate and rhythm, normal S1 S2, no murmur, no carotid bruits  ABDOMEN: soft, nontender, without HSM or masses. Bowel sounds normal  : deferred  MS: extremities normal- no gross deformities noted, no tender, hot or swollen joints.    SKIN: no suspicious lesions or rashes  NEURO: Normal strength and tone, sensory exam grossly normal, DTR normoreflexive in upper and lower extremities  PSYCH: mentation appears normal. and affect normal/bright.  EXT: no peripheral edema, pedal pulses palpable    Assessment:  (Z00.00) Routine general medical examination at a health care facility  (primary encounter diagnosis)  Comment: 58 year old woman in good health  Plan: Today we discussed ways to maintain a healthy lifestyle with sensible eating, regular exercise and self cares. We dicussed calcium and Vitamin D intake, vaccinations and preventive health screens.        (Z13.220) Screening for lipid disorders  Comment: The 10-year ASCVD risk score (Dennis DK, et al., 2019) is: 2.1%  Recent Labs   Lab Test 01/24/24  0930 12/28/22  1428   CHOL 215* 203*   HDL 61 81   * 108*   TRIG 73 72   Plan: Lipid Profile        No need for medications    (E28.39) Estrogen deficiency  (M85.88) Osteopenia of lumbar spine  Comment: due for DEXA, hx of osteopenia  Plan: Dexa hip/pelvis/spine*        Reviewed Calcium/D intake    (E03.9,  E04.9) Hypothyroidism with goiter  Comment: due for routine TSH, will need yearly " thyroid US, asking to switch care from outside endocrinologist to the [facility], currently taking levothyroxine 125mcg daily  TSH   Date Value Ref Range Status   01/24/2024 2.89 0.30 - 4.20 uIU/mL Final   04/20/2022 2.83 0.40 - 4.00 mU/L Final   06/08/2020 2.93 0.40 - 4.00 mU/L Final   Plan: TSH with free T4 reflex, Adult Endocrinology          Referral, TSH with free T4 reflex        Indicated that TSH goal is closer to 1 given hx of follicular/favor papillary biopsy  Recommend she take levothyroxine 125mcg Mon-Sat then take 1.5tab on Sunday  Recheck in 6-8 wks. She indicates she has enough medication at home.     (H02.403) Droopy eyelid, bilateral  Comment: wishes to see ophthalmologist closer to home for complete eye exam. Also has some lid ptosis  Plan: Adult Eye  Referral        Referral entered [facility]    (H91.93) Bilateral change in hearing  Comment: she would like referral to audiologist  Plan: Adult Audiology  Referral        entered    (Z13.9) Screening for condition  Comment: she is interested in screening for diabetes. Father and brother with thin body habitus with type II DM  Plan: Hemoglobin A1c  Lab Results   Component Value Date    A1C 5.4 01/24/2024    A1C 5.3 08/13/2021    A1C 5.1 09/18/2019       Trish Aguilar MD  Internal Medicine/Pediatrics        Answers submitted by the patient for this visit:  Annual Preventive Visit (Submitted on 1/17/2024)  Chief Complaint: Annual Exam:  Frequency of exercise:: 2-3 days/week  Getting at least 3 servings of Calcium per day:: Yes  Diet:: Regular (no restrictions)  Taking medications regularly:: Yes  Medication side effects:: Not applicable  Bi-annual eye exam:: NO  Dental care twice a year:: Yes  Sleep apnea or symptoms of sleep apnea:: None  abdominal pain: No  Blood in stool: No  Blood in urine: No  chest pain: No  chills: No  congestion: Yes  constipation: No  cough: Yes  diarrhea: No  dizziness: No  ear  pain: No  eye pain: No  nervous/anxious: No  fever: No  frequency: No  genital sores: No  headaches: No  hearing loss: No  heartburn: No  arthralgias: No  joint swelling: No  peripheral edema: No  mood changes: No  myalgias: No  nausea: No  dysuria: No  palpitations: No  Skin sensation changes: No  sore throat: No  urgency: No  rash: No  shortness of breath: No  visual disturbance: No  weakness: No  pelvic pain: No  vaginal bleeding: No  vaginal discharge: No  tenderness: No  breast mass: No  breast discharge: No  Additional concerns today:: No  Exercise outside of work (Submitted on 1/17/2024)  Chief Complaint: Annual Exam:  Duration of exercise:: 30-45 minutes

## 2024-01-23 NOTE — PATIENT INSTRUCTIONS
Bobbi Laurent  Nutritionist at [facility]  [address], [address]  [place] [address]  Directions  [phone]    Preventive Health Recommendations  Female Ages 50 - 64    Yearly exam: See your health care provider every year in order to  Review health changes.   Discuss preventive care.    Review your medicines if your doctor has prescribed any.    Get a Pap test every three years (unless you have an abnormal result and your provider advises testing more often).  If you get Pap tests with HPV test, you only need to test every 5 years, unless you have an abnormal result.   You do not need a Pap test if your uterus was removed (hysterectomy) and you have not had cancer.  You should be tested each year for STDs (sexually transmitted diseases) if you're at risk.   Have a mammogram every 1 to 2 years.  Have a colonoscopy at age 45, or have a yearly FIT test (stool test). These exams screen for colon cancer.    Have a cholesterol test every 5 years, or more often if advised.  Have a diabetes test (fasting glucose) every three years. If you are at risk for diabetes, you should have this test more often.   If you are at risk for osteoporosis (brittle bone disease), think about having a bone density scan (DEXA).    Shots: Get a flu shot each year. Get a tetanus shot every 10 years.    Nutrition:   Eat at least 5 servings of fruits and vegetables each day.  Eat whole-grain bread, whole-wheat pasta and brown rice instead of white grains and rice.  Get adequate Calcium and Vitamin D.     Lifestyle  Exercise at least 150 minutes a week (30 minutes a day, 5 days a week). This will help you control your weight and prevent disease.  Limit alcohol to one drink per day.  No smoking.   Wear sunscreen to prevent skin cancer.   See your dentist every six months for an exam and cleaning.  See your eye doctor every 1 to 2 years.

## 2024-01-24 ENCOUNTER — OFFICE VISIT (OUTPATIENT)
Dept: FAMILY MEDICINE | Facility: CLINIC | Age: 59
End: 2024-01-24
Payer: COMMERCIAL

## 2024-01-24 VITALS
HEIGHT: 67 IN | SYSTOLIC BLOOD PRESSURE: 107 MMHG | OXYGEN SATURATION: 98 % | DIASTOLIC BLOOD PRESSURE: 72 MMHG | WEIGHT: 154 LBS | BODY MASS INDEX: 24.17 KG/M2 | RESPIRATION RATE: 15 BRPM | HEART RATE: 74 BPM | TEMPERATURE: 97.4 F

## 2024-01-24 DIAGNOSIS — Z13.220 SCREENING FOR LIPID DISORDERS: ICD-10-CM

## 2024-01-24 DIAGNOSIS — H91.93 BILATERAL CHANGE IN HEARING: ICD-10-CM

## 2024-01-24 DIAGNOSIS — H02.403 DROOPY EYELID, BILATERAL: ICD-10-CM

## 2024-01-24 DIAGNOSIS — Z00.00 ROUTINE GENERAL MEDICAL EXAMINATION AT A HEALTH CARE FACILITY: Primary | ICD-10-CM

## 2024-01-24 DIAGNOSIS — E28.39 ESTROGEN DEFICIENCY: ICD-10-CM

## 2024-01-24 DIAGNOSIS — E03.9 HYPOTHYROIDISM WITH GOITER: ICD-10-CM

## 2024-01-24 DIAGNOSIS — E04.9 HYPOTHYROIDISM WITH GOITER: ICD-10-CM

## 2024-01-24 DIAGNOSIS — M85.88 OSTEOPENIA OF LUMBAR SPINE: ICD-10-CM

## 2024-01-24 DIAGNOSIS — Z13.9 SCREENING FOR CONDITION: ICD-10-CM

## 2024-01-24 LAB
CHOLEST SERPL-MCNC: 215 MG/DL
FASTING STATUS PATIENT QL REPORTED: NO
HBA1C MFR BLD: 5.4 % (ref 0–5.6)
HDLC SERPL-MCNC: 61 MG/DL
LDLC SERPL CALC-MCNC: 139 MG/DL
NONHDLC SERPL-MCNC: 154 MG/DL
TRIGL SERPL-MCNC: 73 MG/DL
TSH SERPL DL<=0.005 MIU/L-ACNC: 2.89 UIU/ML (ref 0.3–4.2)

## 2024-01-24 PROCEDURE — 80061 LIPID PANEL: CPT | Performed by: INTERNAL MEDICINE

## 2024-01-24 PROCEDURE — 84443 ASSAY THYROID STIM HORMONE: CPT | Performed by: INTERNAL MEDICINE

## 2024-01-24 RX ORDER — LEVOTHYROXINE SODIUM 100 UG/1
TABLET ORAL
Start: 2024-01-24 | End: 2024-01-27

## 2024-01-24 RX ORDER — IBUPROFEN 200 MG
CAPSULE ORAL DAILY
COMMUNITY

## 2024-01-24 NOTE — NURSING NOTE
"58 year old  Chief Complaint   Patient presents with    Physical       Blood pressure 107/72, pulse 74, temperature 97.4  F (36.3  C), temperature source Skin, resp. rate 15, height 1.702 m (5' 7\"), weight 69.9 kg (154 lb), SpO2 98%, not currently breastfeeding. Body mass index is 24.12 kg/m .  Patient Active Problem List   Diagnosis    Hypothyroidism with goiter    Family history of colon cancer    Non-toxic multinodular goiter    Hip pain, left    Shoulder pain, left       Wt Readings from Last 2 Encounters:   01/24/24 69.9 kg (154 lb)   01/04/24 68.7 kg (151 lb 8 oz)     BP Readings from Last 3 Encounters:   01/24/24 107/72   01/04/24 (!) 88/53   01/01/24 110/64         Current Outpatient Medications   Medication    calcium carbonate 500 mg, elemental, (OSCAL 500) 1250 (500 Ca) MG TABS tablet    ferrous sulfate (FEROSUL) 325 (65 Fe) MG tablet    levothyroxine (SYNTHROID/LEVOTHROID) 100 MCG tablet    vitamin C (ASCORBIC ACID) 100 MG tablet    acetaminophen (TYLENOL) 500 MG tablet    Benzocaine-Menthol (CEPACOL) 15-2.3 MG LOZG    fluticasone (FLONASE) 50 MCG/ACT nasal spray    predniSONE (DELTASONE) 20 MG tablet     No current facility-administered medications for this visit.       Social History     Tobacco Use    Smoking status: Never    Smokeless tobacco: Never   Vaping Use    Vaping Use: Never used   Substance Use Topics    Alcohol use: Yes     Comment: 7 drinks a week    Drug use: Never       Health Maintenance Due   Topic Date Due    ADVANCE CARE PLANNING  Never done    HEPATITIS B IMMUNIZATION (1 of 3 - 3-dose series) Never done       No results found for: \"PAP\"      January 24, 2024 8:38 AM    "

## 2024-01-26 ENCOUNTER — TELEPHONE (OUTPATIENT)
Dept: ENDOCRINOLOGY | Facility: CLINIC | Age: 59
End: 2024-01-26
Payer: COMMERCIAL

## 2024-01-26 PROBLEM — H91.93 BILATERAL CHANGE IN HEARING: Status: ACTIVE | Noted: 2024-01-26

## 2024-01-26 PROBLEM — M85.88 OSTEOPENIA OF LUMBAR SPINE: Status: ACTIVE | Noted: 2024-01-26

## 2024-01-26 PROBLEM — H02.403 DROOPY EYELID, BILATERAL: Status: ACTIVE | Noted: 2024-01-26

## 2024-01-26 NOTE — TELEPHONE ENCOUNTER
Patient call:     Appointment type: Return Endocrine   Provider: Na (if pt does not want to see her) than Any that see for Hypothyroidism & goiter.  [place] [facility] [place] phone number: [phone]   Additional appointment(s) needed: N/A  Additional notes: VM full, MyC x1   Endocrine triage   She should be scheduled as a follow up, not as a new patient. This is an existing Na patient. If she doesn't want to see Dr Monzon she should still be scheduled as a follow up with another provider, make sure she understands she can't change to another provider again after this.      E-consult may be an option for answer to specific question by the referring provider. E-consults generally have a response within 3 days.   US guided FNAB of thyroid nodules is available through direct scheduling with [email or web address]. This does not require endocrine consult to schedule .   MD Renata Caldera on 1/26/2024 at 10:05 AM

## 2024-01-27 RX ORDER — LEVOTHYROXINE SODIUM 125 UG/1
TABLET ORAL
Start: 2024-01-27 | End: 2024-06-26

## 2024-01-29 ENCOUNTER — TELEPHONE (OUTPATIENT)
Dept: ENDOCRINOLOGY | Facility: CLINIC | Age: 59
End: 2024-01-29
Payer: COMMERCIAL

## 2024-01-29 NOTE — TELEPHONE ENCOUNTER
LVM and sent myc x1 about 8/7 appt being needed? Can be cancelled because of appt in march Cornell Merrill on 1/29/2024 at 3:44 PM

## 2024-02-08 ENCOUNTER — OFFICE VISIT (OUTPATIENT)
Dept: FAMILY MEDICINE | Facility: CLINIC | Age: 59
End: 2024-02-08
Payer: COMMERCIAL

## 2024-02-08 DIAGNOSIS — Z71.3 DIETARY COUNSELING AND SURVEILLANCE: Primary | ICD-10-CM

## 2024-02-08 DIAGNOSIS — E78.00 ELEVATED CHOLESTEROL: ICD-10-CM

## 2024-02-08 NOTE — PATIENT INSTRUCTIONS
Dietary changes to lower cholesterol  Reduce intake of the following sources of cholesterol and saturated fat (fat that comes from animals)  - Red meat and fatty meat that isn't trimmed  - Full fat dairy products such as whole milk, cream, ice cream, butter and cheese  - Baked goods made with saturated or trans fats such as donuts, cakes and cookies  - Saturated oils such as coconut, palm oil and palm kernel oil  - Solid fats such as shortening, margarine    Increase intake of whole grains and soluble fiber  - A variety of fruit and vegetables, aim for 5 servings or more per day  - Include a variety of whole grains such as whole grain bread, cereal, oatmeal, barley and brown rice  - Include low fat dairy products  - Consume poultry without skin  - Include fatty fish such as salmon, albacore tuna and sardines  - Unsalted nuts, seeds (chen seeds, flax seeds) and legumes are also great additions    2. Increase calcium to 4 caps per day (800 mg calcium, 1000 international unit(s) of vitamin D). Take 2 caps at twice daily.     3. Aim for daily physical movement if possible, minimum 150 minutes per week  4. Discussed pros/cons of calorie tracking. May bring awareness and highlight mindless eating patterns. However, do not recommend using movement to adjust calorie intake. Also, if tracking leads to guilt/judgement this may not be the most helpful tool.     Monitoring/Evaluation:  In 3 months following lab check (May 2024). Also can reach out on My Chart with questions.     Bobbi Laurent RD

## 2024-02-08 NOTE — PROGRESS NOTES
[place] Nutrition Assessment    Earnestine is a 58 year old female who presents for nutrition counseling related to elevated cholesterol and weight management. Turning 59 this year which is also motivating to make some changes. Has a family history of diabetes and is wanting to prevent this. Hx thyroid surgery one year ago, working to get thyroid meds to correct levels.     Recent diet recall:  Had previously though of self as a healthy eater, but in the past week has noticed areas to improve. Cutting out pork and beef. Limiting fat and full fat.  Wake up - 3 cups coffee with 1% milk  Breakfast 8:30: steel cut oats, 1/2 cup blueberries, banana  Lunch: big salad, 3 oz protein (grilled salmon), chopped veg, edamame, 1/4 1% cottage cheese  Snack: grapes, grapefruit  Dinner: harder routine. Air fryer tofu, brown rice, vegetable.   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 Lives with . Three adult kids out of the house.   Physical activity: [facility] (started in Sept x 3 months). Since the new year has had multiple colds, eager to feel better and get back to this routine.   Vitamins/Supplements: iron, vitamin c, calcium/vitamin D (400 mg)    Recent weights:   Wt Readings from Last 6 Encounters:   01/24/24 69.9 kg (154 lb)   01/04/24 68.7 kg (151 lb 8 oz)   01/01/24 68 kg (150 lb)   05/23/23 67.1 kg (148 lb)   12/28/22 70.8 kg (156 lb)   11/28/22 71.2 kg (157 lb)     Recent A1C values:   Hemoglobin A1C   Date Value Ref Range Status   01/24/2024 5.4 0.0 - 5.6 % Final     Comment:     Normal <5.7%   Prediabetes 5.7-6.4%    Diabetes 6.5% or higher     Note: Adopted from ADA consensus guidelines.   08/13/2021 5.3 0.0 - 5.6 % Final     Comment:     Normal <5.7%   Prediabetes 5.7-6.4%     Diabetes 6.5% or higher     Note: Adopted from ADA consensus guidelines.   09/18/2019 5.1 4.1 - 5.7 % Final     Recent lipid values:   Cholesterol   Date Value Ref Range Status   01/24/2024 215 (H) <200 mg/dL Final   12/28/2022 203 (H) <200 mg/dL Final     Direct Measure HDL   Date Value Ref Range Status   01/24/2024 61 >=50 mg/dL Final   12/28/2022 81 >=50 mg/dL Final     LDL Cholesterol Calculated   Date Value Ref Range Status   01/24/2024 139 (H) <=100 mg/dL Final   12/28/2022 108 (H) <=100 mg/dL Final     Triglycerides   Date Value Ref Range Status   01/24/2024 73 <150 mg/dL Final   12/28/2022 72 <150 mg/dL Final         Intervention(s):  Earnestine is a 58 year old female who presents for nutrition counseling related to elevated cholesterol and weight management. In the past week has made some adjustments including adding oatmeal for breakfast, eliminating beef, pork and butter. We talked through dinner which is most challenging, reviewed her favorite cookbook, able to identify small changes to continue preparing these recipes with heart health in mind (brown rice, vegetarian options, etc).   Dietary changes to lower cholesterol  Reduce intake of the following sources of cholesterol and saturated fat (fat that comes from animals)  - Red meat and fatty meat that isn't trimmed  - Full fat dairy products such as whole milk, cream, ice cream, butter and cheese  - Baked goods made with saturated or trans fats such as donuts, cakes and cookies  - Saturated oils such as coconut, palm oil and palm kernel oil  - Solid fats such as shortening, margarine    Increase intake of whole grains and soluble fiber  - A variety of fruit and vegetables, aim for 5 servings or more per day  - Include a variety of whole grains such as whole grain bread, cereal, oatmeal, barley and brown rice  - Include low fat dairy products  - Consume poultry without skin  - Include fatty fish such as salmon, albacore tuna and sardines  - Unsalted  nuts, seeds (chen seeds, flax seeds) and legumes are also great additions    2. Increase calcium to 4 caps per day (800 mg calcium, 1000 international unit(s) of vitamin D). Take 2 caps twice daily.     3. Aim for daily physical movement if possible, minimum 150 minutes per week  4. Discussed pros/cons of calorie tracking. May bring awareness and highlight mindless eating patterns. However, do not recommend using movement to adjust calorie intake. Also, if tracking leads to guilt/judgement this may not be the most helpful tool.     Monitoring/Evaluation:  In 3 months following lab check (May 2024)    Patient referred by Trish Aguilar MD   Total time spent with patient 55 minutes    Bobbi Laurent RD

## 2024-02-09 ENCOUNTER — TELEPHONE (OUTPATIENT)
Dept: ENDOCRINOLOGY | Facility: CLINIC | Age: 59
End: 2024-02-09
Payer: COMMERCIAL

## 2024-02-09 NOTE — TELEPHONE ENCOUNTER
Patient call:     Appointment type: return endocrine   Provider: Terrance   Return date: 3/15 appt   Speciality phone number: [phone]  Additional appointment(s) needed:   Additional notes: LVM and sent myc x1     Provider changed template on 3/15 from in person to virtual; will need to change appt to virtual or reschedule to next available in person     Cornell Merrill on 2/9/2024 at 10:11 AM

## 2024-03-06 ENCOUNTER — TELEPHONE (OUTPATIENT)
Dept: FAMILY MEDICINE | Facility: CLINIC | Age: 59
End: 2024-03-06

## 2024-03-06 DIAGNOSIS — E03.9 HYPOTHYROIDISM WITH GOITER: Primary | ICD-10-CM

## 2024-03-06 DIAGNOSIS — E04.9 HYPOTHYROIDISM WITH GOITER: Primary | ICD-10-CM

## 2024-03-06 NOTE — TELEPHONE ENCOUNTER
Who is calling? Patient  What do they need? Lipid panel  Is this an insurance referral? No  Does caller need a call back? No  Additional Comments: Pt requested if Jeff would add a lipid panel on to her thyroid labs she is coming in for.    Victoria

## 2024-03-07 DIAGNOSIS — Z13.220 LIPID SCREENING: Primary | ICD-10-CM

## 2024-03-08 ENCOUNTER — LAB (OUTPATIENT)
Dept: LAB | Facility: CLINIC | Age: 59
End: 2024-03-08
Payer: COMMERCIAL

## 2024-03-08 DIAGNOSIS — E04.9 HYPOTHYROIDISM WITH GOITER: ICD-10-CM

## 2024-03-08 DIAGNOSIS — E03.9 HYPOTHYROIDISM WITH GOITER: ICD-10-CM

## 2024-03-08 DIAGNOSIS — Z13.220 LIPID SCREENING: ICD-10-CM

## 2024-03-08 PROCEDURE — 84443 ASSAY THYROID STIM HORMONE: CPT

## 2024-03-09 LAB — TSH SERPL DL<=0.005 MIU/L-ACNC: 1.08 UIU/ML (ref 0.3–4.2)

## 2024-03-20 ENCOUNTER — OFFICE VISIT (OUTPATIENT)
Dept: OPHTHALMOLOGY | Facility: CLINIC | Age: 59
End: 2024-03-20
Payer: COMMERCIAL

## 2024-03-20 DIAGNOSIS — Z83.511 FAMILY HISTORY OF GLAUCOMA: ICD-10-CM

## 2024-03-20 DIAGNOSIS — H52.4 PRESBYOPIA: ICD-10-CM

## 2024-03-20 DIAGNOSIS — H02.403 DROOPY EYELID, BILATERAL: Primary | ICD-10-CM

## 2024-03-20 PROCEDURE — 92004 COMPRE OPH EXAM NEW PT 1/>: CPT | Performed by: OPTOMETRIST

## 2024-03-20 PROCEDURE — 92015 DETERMINE REFRACTIVE STATE: CPT | Performed by: OPTOMETRIST

## 2024-03-20 ASSESSMENT — VISUAL ACUITY
OS_CC: 20/20
CORRECTION_TYPE: GLASSES
OD_CC: 20/20
METHOD: SNELLEN - LINEAR

## 2024-03-20 ASSESSMENT — REFRACTION_WEARINGRX
OS_SPHERE: +0.75
OD_AXIS: 145
OS_AXIS: 021
OS_ADD: +2.50
SPECS_TYPE: PAL
OD_CYLINDER: +0.75
OD_ADD: +2.50
OS_CYLINDER: +0.25
OD_SPHERE: +0.50

## 2024-03-20 ASSESSMENT — CONF VISUAL FIELD
OD_SUPERIOR_TEMPORAL_RESTRICTION: 0
OD_INFERIOR_TEMPORAL_RESTRICTION: 0
OS_NORMAL: 1
OD_NORMAL: 1
OS_SUPERIOR_TEMPORAL_RESTRICTION: 0
OS_INFERIOR_NASAL_RESTRICTION: 0
METHOD: COUNTING FINGERS
OS_SUPERIOR_NASAL_RESTRICTION: 0
OD_SUPERIOR_NASAL_RESTRICTION: 0
OS_INFERIOR_TEMPORAL_RESTRICTION: 0
OD_INFERIOR_NASAL_RESTRICTION: 0

## 2024-03-20 ASSESSMENT — SLIT LAMP EXAM - LIDS
COMMENTS: NORMAL
COMMENTS: NORMAL

## 2024-03-20 ASSESSMENT — REFRACTION_MANIFEST
OS_CYLINDER: +0.50
OD_AXIS: 149
OD_CYLINDER: +0.50
OD_ADD: +2.50
OS_ADD: +2.50
OS_AXIS: 014
OD_SPHERE: +1.00
OS_SPHERE: +1.00

## 2024-03-20 ASSESSMENT — EXTERNAL EXAM - LEFT EYE: OS_EXAM: NORMAL

## 2024-03-20 ASSESSMENT — TONOMETRY
IOP_METHOD: ICARE
OS_IOP_MMHG: 17
OD_IOP_MMHG: 15

## 2024-03-20 ASSESSMENT — EXTERNAL EXAM - RIGHT EYE: OD_EXAM: NORMAL

## 2024-03-20 ASSESSMENT — CUP TO DISC RATIO
OS_RATIO: 0.25
OD_RATIO: 0.25

## 2024-03-20 NOTE — PROGRESS NOTES
Assessment/Plan  (H02.403) Droopy eyelid, bilateral  (primary encounter diagnosis)  Comment: More significant OD than OS per patient. No appreciable ptosis or abnormality in clinic today.   Plan: Discussed findings with patient. If this reappears, patient should take a picture and send to this provider via [employer]. Return to clinic with new double vision or eye pain.     (Z83.511) Family history of glaucoma  Plan: Monitor. No evidence of glaucomatous changes today.     (H52.4) Presbyopia  Plan: Discussed findings with patient. New spectacle prescription dispensed to patient. Patient is welcome to return to clinic with prolonged adaptation difficulties.     Complete documentation of historical and exam elements from today's encounter can  be found in the full encounter summary report (not reduplicated in this progress  note). I personally obtained the chief complaint(s) and history of present illness. I  confirmed and edited as necessary the review of systems, past medical/surgical  history, family history, social history, and examination findings as documented by  others; and I examined the patient myself. I personally reviewed the relevant tests,  images, and reports as documented above. I formulated and edited as necessary the  assessment and plan and discussed the findings and management plan with the  patient and family.    Momo Lezama, SERGIO

## 2024-03-20 NOTE — NURSING NOTE
Chief Complaints and History of Present Illnesses   Patient presents with    COMPREHENSIVE EYE EXAM     Chief Complaint(s) and History of Present Illness(es)       COMPREHENSIVE EYE EXAM              Laterality: both eyes    Course: stable    Associated symptoms: floaters.  Negative for glare, dryness, eye pain and flashes    Pain scale: 0/10              Comments    She states that 6 months ago she started noticing that her right upper lid has been drooping.  The lid appears puffy at times.  She states that her vision has seemed stable in both eyes since her last eye exam (2 years ago).  Patient denies having any eye discomfort.    Natasha Malhotra, COT 12:35 PM  March 20, 2024

## 2024-04-11 ENCOUNTER — ANCILLARY PROCEDURE (OUTPATIENT)
Dept: BONE DENSITY | Facility: CLINIC | Age: 59
End: 2024-04-11
Attending: INTERNAL MEDICINE
Payer: COMMERCIAL

## 2024-04-11 DIAGNOSIS — E28.39 ESTROGEN DEFICIENCY: ICD-10-CM

## 2024-04-11 PROCEDURE — 77080 DXA BONE DENSITY AXIAL: CPT | Performed by: INTERNAL MEDICINE

## 2024-04-29 NOTE — PROGRESS NOTES
AUDIOLOGY REPORT    SUBJECTIVE:  Earnestine Mcpherson is a 58 year old female who was seen on 5/8/24 in Audiology at the [facility] for audiologic evaluation, referred by Trish Aguilar MD.        Earnestine was previously evaluated at this clinic on 10/9/2019.  Results indicated normal hearing in the right ear, except at 1000 Hz, where there was a mild sensorineural hearing loss.  Hearing was normal in the left ear.      Patient is unsure if there have been hearing changes.  She has two older siblings who have recently gotten hearing aids.  The patient denies tinnitus, dizziness, ear pain, drainage from ears, aural fullness, history of ear surgery, or history of noise exposure.      OBJECTIVE:  Abuse Screening:  Do you feel unsafe at home or work/school? No  Do you feel threatened by someone? No  Does anyone try to keep you from having contact with others, or doing things outside of your home? No  Physical signs of abuse present? No    Fall Risk Screen:  1. Have you fallen two or more times in the past year? No  2. Have you fallen and had an injury in the past year? No    Otoscopic exam indicates ears are clear of cerumen bilaterally.     Pure Tone Thresholds assessed using conventional audiometry with good  reliability from 250-8000 Hz bilaterally using insert earphones and circumaural headphones.     RIGHT:  Normal/borderline normal hearing except at 1000 Hz, where there is a mild sensorineural hearing loss.     LEFT:   Normal/borderline normal hearing except at 8000 Hz, where there is a mild presumed sensorineural hearing loss.     Tympanogram:    RIGHT: normal eardrum mobility     LEFT:   normal eardrum mobility     Reflexes (reported by stimulus ear):   RIGHT: Ipsilateral is present at elevated levels (100 dB)  RIGHT: Contralateral is present at elevated levels (100 dB)  LEFT:   Ipsilateral is present at normal levels (95 dB)  LEFT:   Contralateral is present at  normal levels (95 dB)    Speech Reception Threshold:    RIGHT: 20 dB HL    LEFT:   20 dB HL  Word Recognition Score:     RIGHT: 100% at 60 dB HL using NU-6 recorded word list.    LEFT:   100% at 60 dB HL using NU-6 recorded word list.      ASSESSMENT:   Generally normal hearing except for mild sensorineural hearing loss at 1000 Hz in the right ear and at 8000 Hz in the left ear.     Compared to 10/9/19 results, hearing has remained essentially stable in both ears except for 10 dB decrease at 8000 Hz in the right ear.  Stable word recognition in both ears.       Today s results were discussed with the patient in detail.     PLAN:    Recheck hearing if changes are noted.   Use hearing protection in loud environments (yardwork, concerts, etc.).   Follow up with PCP.     The patient expressed understanding and agreement with this plan.    Sunni Leger, CCC-A, [facility]  Licensed Audiologist  [place] #[id number]    Cc Trish Aguilar MD

## 2024-05-08 ENCOUNTER — OFFICE VISIT (OUTPATIENT)
Dept: AUDIOLOGY | Facility: CLINIC | Age: 59
End: 2024-05-08
Payer: COMMERCIAL

## 2024-05-08 DIAGNOSIS — H90.3 SENSORINEURAL HEARING LOSS (SNHL) OF BOTH EARS: Primary | ICD-10-CM

## 2024-05-08 DIAGNOSIS — H91.93 BILATERAL CHANGE IN HEARING: ICD-10-CM

## 2024-05-08 PROCEDURE — 92550 TYMPANOMETRY & REFLEX THRESH: CPT | Performed by: AUDIOLOGIST-HEARING AID FITTER

## 2024-05-08 PROCEDURE — 92557 COMPREHENSIVE HEARING TEST: CPT | Performed by: AUDIOLOGIST-HEARING AID FITTER

## 2024-05-10 ENCOUNTER — LAB (OUTPATIENT)
Dept: LAB | Facility: CLINIC | Age: 59
End: 2024-05-10
Payer: COMMERCIAL

## 2024-05-10 DIAGNOSIS — Z13.220 LIPID SCREENING: ICD-10-CM

## 2024-05-10 LAB
CHOLEST SERPL-MCNC: 160 MG/DL
FASTING STATUS PATIENT QL REPORTED: YES
HDLC SERPL-MCNC: 69 MG/DL
LDLC SERPL CALC-MCNC: 79 MG/DL
NONHDLC SERPL-MCNC: 91 MG/DL
TRIGL SERPL-MCNC: 58 MG/DL

## 2024-05-10 PROCEDURE — 80061 LIPID PANEL: CPT

## 2024-05-29 ENCOUNTER — E-VISIT (OUTPATIENT)
Dept: URGENT CARE | Facility: CLINIC | Age: 59
End: 2024-05-29
Payer: COMMERCIAL

## 2024-05-29 DIAGNOSIS — B00.1 HERPES LABIALIS: Primary | ICD-10-CM

## 2024-05-29 PROCEDURE — 99421 OL DIG E/M SVC 5-10 MIN: CPT | Performed by: PHYSICIAN ASSISTANT

## 2024-05-29 RX ORDER — VALACYCLOVIR HYDROCHLORIDE 1 G/1
2000 TABLET, FILM COATED ORAL 2 TIMES DAILY
Qty: 12 TABLET | Refills: 0 | Status: SHIPPED | OUTPATIENT
Start: 2024-05-29 | End: 2024-06-04

## 2024-05-29 NOTE — PATIENT INSTRUCTIONS
Valacyclovir sent in    Cold Sores: Care Instructions  Overview  Cold sores are clusters of small blisters on the lip and skin around or inside the mouth. Often the first sign of a cold sore is a spot that tingles, burns, or itches. A blister usually forms within 24 hours. They are sometimes called fever blisters. The skin around the blisters can be red and inflamed. The blisters can break open, weep a clear fluid, and then scab over after a few days. Cold sores often heal in 7 to 10 days with no scar.  Cold sores are caused by the herpes simplex virus. The virus is spread by skin-to-skin contact. That means that if you have a cold sore and kiss another person, that person could get a cold sore too.  This is the same virus that causes some cases of genital herpes. So if you have a cold sore and have oral sex with someone, that person could get a sore in the genital area.  Cold sores will often go away on their own. But if they're painful, ask your doctor about a prescription antiviral medicine. It can relieve pain, help prevent outbreaks, and shorten the healing time.  Follow-up care is a key part of your treatment and safety. Be sure to make and go to all appointments, and call your doctor if you are having problems. It's also a good idea to know your test results and keep a list of the medicines you take.  How can you care for yourself at home?  Wash your hands often. And try not to touch your cold sores. This will help to avoid spreading the virus to your eyes or genital area or to other people. This is more likely to happen if this is your first cold sore outbreak.  To help relieve pain, try placing a cold, wet towel on the sore. This can also reduce swelling.  If you are just getting a cold sore, try using over-the-counter docosanol (Abreva) cream to reduce symptoms.  If your doctor prescribed antiviral medicine to relieve pain and shorten the healing time, be sure to follow the directions.  Take an  "over-the-counter pain medicine, such as acetaminophen (Tylenol), ibuprofen (Advil, Motrin), or naproxen (Aleve), as needed. Read and follow all instructions on the label. No one younger than 20 should take aspirin. It has been linked to Reye syndrome, a serious illness.  Do not take two or more pain medicines at the same time unless the doctor told you to. Many pain medicines have acetaminophen, which is Tylenol. Too much acetaminophen (Tylenol) can be harmful.  Avoid citrus fruit, tomatoes, and other foods that contain acid.  Use over-the-counter ointments, such as Anbesol or Orajel, to numb sore areas in the mouth or on the lips.  Do not kiss or have oral sex with anyone while you have a cold sore.  To prevent cold sores in the future  Avoid long exposure of your lips to sunlight. (Wear a hat to help shade your mouth.)  Using lip balm that contains sunscreen may help reduce outbreaks of cold sores.  Do not share towels, razors, silverware, toothbrushes, or other objects with a person who has a cold sore.  For frequent or painful cold sores, try taking an antiviral medicine daily to decrease outbreaks.  When should you call for help?   Call your doctor now or seek immediate medical care if:    Your symptoms are painful and you want to try antiviral medicine.     You have signs of infection, such as:  Increased pain, swelling, warmth, or redness.  Red streaks leading from a cold sore.  Pus draining from a cold sore.  A fever.     You have a cold sore and develop eye pain, eye discharge, or any changes in your vision.   Watch closely for changes in your health, and be sure to contact your doctor if:    The cold sore does not heal in 7 to 10 days.     You get cold sores often.   Where can you learn more?  Go to https://www.healthwise.net/patiented  Enter R850 in the search box to learn more about \"Cold Sores: Care Instructions.\"  Current as of: August 6, 2023               Content Version: 14.0    [id number] " Healthwise, [employer].   Care instructions adapted under license by your healthcare professional. If you have questions about a medical condition or this instruction, always ask your healthcare professional. Healthwise, [employer] disclaims any warranty or liability for your use of this information.

## 2024-06-26 ENCOUNTER — MYC REFILL (OUTPATIENT)
Dept: FAMILY MEDICINE | Facility: CLINIC | Age: 59
End: 2024-06-26

## 2024-06-26 DIAGNOSIS — E04.9 HYPOTHYROIDISM WITH GOITER: Primary | ICD-10-CM

## 2024-06-26 DIAGNOSIS — E03.9 HYPOTHYROIDISM WITH GOITER: Primary | ICD-10-CM

## 2024-06-27 RX ORDER — LEVOTHYROXINE SODIUM 125 UG/1
TABLET ORAL
Qty: 90 TABLET | Refills: 0 | Status: SHIPPED | OUTPATIENT
Start: 2024-06-27 | End: 2024-08-07

## 2024-06-27 NOTE — TELEPHONE ENCOUNTER
Pt has endocrinology appointment to establish care with Dr. Ann on 8/7/24. Will fill 90 tab supply for now so that pt has no gaps in coverage.    Cayden STUART, RN  06/27/24 11:40 AM

## 2024-06-28 ENCOUNTER — OFFICE VISIT (OUTPATIENT)
Dept: DERMATOLOGY | Facility: CLINIC | Age: 59
End: 2024-06-28
Payer: COMMERCIAL

## 2024-06-28 DIAGNOSIS — D18.01 CHERRY ANGIOMA: ICD-10-CM

## 2024-06-28 DIAGNOSIS — D22.9 MULTIPLE BENIGN NEVI: ICD-10-CM

## 2024-06-28 DIAGNOSIS — L72.0 MILIA: Primary | ICD-10-CM

## 2024-06-28 DIAGNOSIS — L81.4 LENTIGINES: ICD-10-CM

## 2024-06-28 DIAGNOSIS — L82.1 SEBORRHEIC KERATOSIS: ICD-10-CM

## 2024-06-28 PROCEDURE — 99203 OFFICE O/P NEW LOW 30 MIN: CPT | Performed by: DERMATOLOGY

## 2024-06-28 ASSESSMENT — PAIN SCALES - GENERAL: PAINLEVEL: NO PAIN (0)

## 2024-06-28 NOTE — NURSING NOTE
Chief Complaint   Patient presents with    Skin Check     Patient reports no specific lesions of concern. The patient would like a FBSE.      Kisha Oconnell LPN

## 2024-06-28 NOTE — LETTER
6/28/2024       RE: Earnestine Mcpherson  [address]  [place] [address]     Dear Colleague,    Thank you for referring your patient, Earnestine Mcpherson, to the [facility] [place] at [facility]. Please see a copy of my visit note below.    [facility] Dermatology Note  Encounter Date: Jun 28, 2024  Office Visit     Dermatology Problem List:  1. Benign skin exam 6/28/24  2. Family history of melanoma in paternal uncle  3. Milia, s/p extraction 6/28/24    ____________________________________________    Assessment & Plan:    # [facility], face  - reassured benign nature of lesions  - discussed treatment options including topical tretinoin or physical removal; explained that they can often recur following removal   - will remove most bothersome one, see procedure note below    # Lesions to monitor, left posterior thigh and left popliteal fossa; favor ISK or inflammatory  - reassured benign appearance of both lesions on exam  - counseled patient to continue monitoring lesions and contact office with any changes in color or size  - will photograph today     # Benign lesions - SKs, cherry angiomas, lentigenes.  - No treatment required    # Multiple benign nevi.   - Monitor for ABCDEs of melanoma   - Continue sun protection - recommend SPF 30 or higher with frequent application   - Return sooner if noticing changing or symptomatic lesions    Procedures Performed:   See below    Follow-up: 1 year(s) in-person for annual skin exam, or earlier for new or changing lesions    Staff and Medical Student:     Nicolas Young, visiting MS4  Patient seen and evaluated with attending physician    Staff Physician:  I was present with the medical student who participated in the service and in the documentation of the note. I have verified the history and personally performed the physical exam and medical decision making. I agree with  the assessment and plan of care as documented in the note.     The procedure(s) was(were) performed by myself.    - After verbal consent obtained, skin cleansed with alcohol prep pad and lesion nicked with #11 blade. Contents expressed. Patient tolerated procedure well.    Kimberly Roberts MD    Department of Dermatology  [facility]: Phone: [phone], Fax: [phone]  7/4/2024    ____________________________________________    CC: Skin Check (Patient reports no specific lesions of concern. The patient would like a FBSE. )    HPI:  Ms. Earnestine Mcpherson is a(n) 58 year old female who presents today as a new patient for a full body skin exam. She denies any personal history of skin cancer, but does endorse a family history of melanoma in her paternal uncle. She reports a history of blistering sunburns in her childhood, but does explain that she uses a daily SPF in her lotion. Regarding concerns today, she states that she has developed some small white bumps on her face during the last 2 years, and denies any treatment of the lesions at home. She would like to discuss potential treatment options for the lesions in addition to her full body skin exam.     Patient is otherwise feeling well, without additional skin concerns.    Labs:  None reviewed.    Physical Exam:  Vitals: There were no vitals taken for this visit.  SKIN: Full body skin exam excluding the genitals was performed including face, scalp, neck, ears, chest, back, bilateral arms, hands, bilateral legs, feet, and buttocks.   - There are dome shaped bright red papules on the trunk and extremities .   - Multiple regular brown pigmented macules and papules are identified on the trunk and extremities. .   - Scattered brown macules on sun exposed areas.  - Waxy stuck on papules and plaques on trunk and extremities.   - several small white papules located on the  forehead and upper eyelids   - two non-specific pink papule with surrounding scale of the left posterior thigh and left popliteal fossa  - No other lesions of concern on areas examined.     Medications:  Current Outpatient Medications   Medication Sig Dispense Refill     calcium carbonate 500 mg, elemental, (OSCAL 500) 1250 (500 Ca) MG TABS tablet Take by mouth daily       ferrous sulfate (FEROSUL) 325 (65 Fe) MG tablet        levothyroxine (SYNTHROID/LEVOTHROID) 125 MCG tablet Take one daily Mon - Sat then take 1.5 tablets on Sunday 90 tablet 0     vitamin C (ASCORBIC ACID) 100 MG tablet        valACYclovir (VALTREX) 1000 mg tablet Take 2 tablets (2,000 mg) by mouth 2 times daily for 6 days At first sign of outbreak. 12 tablet 0     No current facility-administered medications for this visit.      Past Medical History:   Patient Active Problem List   Diagnosis     Hypothyroidism with goiter     Family history of colon cancer     Non-toxic multinodular goiter     Hip pain, left     Shoulder pain, left     Osteopenia of lumbar spine     Bilateral change in hearing     Droopy eyelid, bilateral     Past Medical History:   Diagnosis Date     Thyroid disease         CC Referred Self, MD  No address on file on close of this encounter.      Again, thank you for allowing me to participate in the care of your patient.      Sincerely,    Kimberly Roberts MD

## 2024-06-28 NOTE — PROGRESS NOTES
[facility] Dermatology Note  Encounter Date: Jun 28, 2024  Office Visit     Dermatology Problem List:  1. Benign skin exam 6/28/24  2. Family history of melanoma in paternal uncle  3. [facility], s/p extraction 6/28/24    ____________________________________________    Assessment & Plan:    # Milia, face  - reassured benign nature of lesions  - discussed treatment options including topical tretinoin or physical removal; explained that they can often recur following removal   - will remove most bothersome one, see procedure note below    # Lesions to monitor, left posterior thigh and left popliteal fossa; favor ISK or inflammatory  - reassured benign appearance of both lesions on exam  - counseled patient to continue monitoring lesions and contact office with any changes in color or size  - will photograph today     # Benign lesions - SKs, cherry angiomas, lentigenes.  - No treatment required    # Multiple benign nevi.   - Monitor for ABCDEs of melanoma   - Continue sun protection - recommend SPF 30 or higher with frequent application   - Return sooner if noticing changing or symptomatic lesions    Procedures Performed:   See below    Follow-up: 1 year(s) in-person for annual skin exam, or earlier for new or changing lesions    Staff and Medical Student:     Nicolas Young, visiting MS4  Patient seen and evaluated with attending physician    Staff Physician:  I was present with the medical student who participated in the service and in the documentation of the note. I have verified the history and personally performed the physical exam and medical decision making. I agree with the assessment and plan of care as documented in the note.     The procedure(s) was(were) performed by myself.    - After verbal consent obtained, skin cleansed with alcohol prep pad and lesion nicked with #11 blade. Contents expressed. Patient tolerated procedure well.    Kimberly Roberts MD    Department of  Dermatology  [facility]: Phone: [phone], Fax: [phone]  7/4/2024    ____________________________________________    CC: Skin Check (Patient reports no specific lesions of concern. The patient would like a FBSE. )    HPI:  Ms. Earnestine Mcpherson is a(n) 58 year old female who presents today as a new patient for a full body skin exam. She denies any personal history of skin cancer, but does endorse a family history of melanoma in her paternal uncle. She reports a history of blistering sunburns in her childhood, but does explain that she uses a daily SPF in her lotion. Regarding concerns today, she states that she has developed some small white bumps on her face during the last 2 years, and denies any treatment of the lesions at home. She would like to discuss potential treatment options for the lesions in addition to her full body skin exam.     Patient is otherwise feeling well, without additional skin concerns.    Labs:  None reviewed.    Physical Exam:  Vitals: There were no vitals taken for this visit.  SKIN: Full body skin exam excluding the genitals was performed including face, scalp, neck, ears, chest, back, bilateral arms, hands, bilateral legs, feet, and buttocks.   - There are dome shaped bright red papules on the trunk and extremities .   - Multiple regular brown pigmented macules and papules are identified on the trunk and extremities. .   - Scattered brown macules on sun exposed areas.  - Waxy stuck on papules and plaques on trunk and extremities.   - several small white papules located on the forehead and upper eyelids   - two non-specific pink papule with surrounding scale of the left posterior thigh and left popliteal fossa  - No other lesions of concern on areas examined.     Medications:  Current Outpatient Medications   Medication Sig Dispense Refill    calcium carbonate 500 mg, elemental, (OSCAL 500) 1250 (500 Ca)  MG TABS tablet Take by mouth daily      ferrous sulfate (FEROSUL) 325 (65 Fe) MG tablet       levothyroxine (SYNTHROID/LEVOTHROID) 125 MCG tablet Take one daily Mon - Sat then take 1.5 tablets on Sunday 90 tablet 0    vitamin C (ASCORBIC ACID) 100 MG tablet       valACYclovir (VALTREX) 1000 mg tablet Take 2 tablets (2,000 mg) by mouth 2 times daily for 6 days At first sign of outbreak. 12 tablet 0     No current facility-administered medications for this visit.      Past Medical History:   Patient Active Problem List   Diagnosis    Hypothyroidism with goiter    Family history of colon cancer    Non-toxic multinodular goiter    Hip pain, left    Shoulder pain, left    Osteopenia of lumbar spine    Bilateral change in hearing    Droopy eyelid, bilateral     Past Medical History:   Diagnosis Date    Thyroid disease         CC Referred Self, MD  No address on file on close of this encounter.

## 2024-07-05 ENCOUNTER — TELEPHONE (OUTPATIENT)
Dept: DERMATOLOGY | Facility: CLINIC | Age: 59
End: 2024-07-05
Payer: COMMERCIAL

## 2024-07-05 NOTE — TELEPHONE ENCOUNTER
Left Voicemail (1st Attempt) and Sent Mychart (1st Attempt) for the patient to call back and schedule the following:    Appointment type: FOLLOW UP    Provider: bebe    Return date: 07/2025     Specialty phone number: [phone]    Additonal Notes: na

## 2024-07-05 NOTE — PATIENT INSTRUCTIONS

## 2024-07-09 ENCOUNTER — TELEPHONE (OUTPATIENT)
Dept: DERMATOLOGY | Facility: CLINIC | Age: 59
End: 2024-07-09
Payer: COMMERCIAL

## 2024-07-11 NOTE — CONFIDENTIAL NOTE
RECORDS RECEIVED FROM: internal    DATE RECEIVED: 8/7/24    NOTES (FOR ALL VISITS) STATUS DETAILS   OFFICE NOTES from referring provider internal  Trish Aguilar MD    OFFICE NOTES from other specialist internal  9.9.22 [facility]    Received - 8.22.22 endo if [facility]   ED NOTES     OPERATIVE REPORT  (thyroid, pituitary, adrenal, parathyroid) internal  10.19.22 [facility]    MEDICATION LIST internal     IMAGING      DEXASCAN internal  4/11/24   ULTRASOUND (HEAD/NECK) internal  7/11/24   LABS     DIABETES: HBGA1C, CREATININE, FASTING LIPIDS, MICROALBUMIN URINE, POTASSIUM, TSH, T4    THYROID: TSH, T4, CBC, THYRODLONULIN, TOTAL T3, FREE T4, CALCITONIN, CEA internal

## 2024-07-14 ASSESSMENT — ACTIVITIES OF DAILY LIVING (ADL)
HOW_WOULD_YOU_RATE_YOUR_CURRENT_LEVEL_OF_FUNCTION_DURING_YOUR_SPORTS_RELATED_ACTIVITIES_FROM_0_TO_100_WITH_100_BEING_YOUR_LEVEL_OF_FUNCTION_PRIOR_TO_YOUR_HIP_PROBLEM_AND_0_BEING_THE_INABILITY_TO_PERFORM_ANY_OF_YOUR_USUAL_DAILY_ACTIVITIES?: 90
RECREATIONAL ACTIVITIES: SLIGHT DIFFICULTY
GOING DOWN 1 FLIGHT OF STAIRS: NO DIFFICULTY AT ALL
WALKING_INITIALLY: NO DIFFICULTY AT ALL
SPORTS_SCORE(%): 0
LIGHT_TO_MODERATE_WORK: NO DIFFICULTY AT ALL
ROLLING_OVER_IN_BED: SLIGHT DIFFICULTY
TWISTING/PIVOTING ON INVOLVED LEG: MODERATE DIFFICULTY
DEEP_SQUATTING: NO DIFFICULTY AT ALL
GOING_UP_1_FLIGHT_OF_STAIRS: NO DIFFICULTY AT ALL
SITTING FOR 15 MINUTES: NO DIFFICULTY AT ALL
GETTING_INTO_AND_OUT_OF_A_BATHTUB: SLIGHT DIFFICULTY
RECREATIONAL_ACTIVITIES: SLIGHT DIFFICULTY
ABILITY_TO_PARTICIPATE_IN_YOUR_DESIRED_SPORT_AS_LONG_AS_YOU_WOULD_LIKE: SLIGHT DIFFICULTY
WALKING_15_MINUTES_OR_GREATER: NO DIFFICULTY AT ALL
ADL_HIGHEST_POTENTIAL_SCORE: 68
SPORTS_TOTAL_ITEM_SCORE: 0
GOING_DOWN_1_FLIGHT_OF_STAIRS: NO DIFFICULTY AT ALL
STANDING_FOR_15_MINUTES: NO DIFFICULTY AT ALL
HOS_ADL_ITEM_SCORE_TOTAL: 58
PUTTING ON SOCKS AND SHOES: NO DIFFICULTY AT ALL
LOW_IMPACT_ACTIVITIES_LIKE_FAST_WALKING: NO DIFFICULTY AT ALL
GETTING_INTO_AND_OUT_OF_AN_AVERAGE_CAR: SLIGHT DIFFICULTY
HOW_WOULD_YOU_RATE_YOUR_CURRENT_LEVEL_OF_FUNCTION?: NEARLY NORMAL
HOS_ADL_HIGHEST_POTENTIAL_SCORE: 64
STEPPING UP AND DOWN CURBS: NO DIFFICULTY AT ALL
STARTING_AND_STOPPING_QUICKLY: NO DIFFICULTY AT ALL
ADL_COUNT: 17
WALKING_DOWN_STEEP_HILLS: NO DIFFICULTY AT ALL
GETTING INTO AND OUT OF AN AVERAGE CAR: SLIGHT DIFFICULTY
SPORTS_COUNT: 9
RUNNING_ONE_MILE: SLIGHT DIFFICULTY
WALKING_UP_STEEP_HILLS: NO DIFFICULTY AT ALL
HOW_WOULD_YOU_RATE_YOUR_CURRENT_LEVEL_OF_FUNCTION_DURING_YOUR_USUAL_ACTIVITIES_OF_DAILY_LIVING_FROM_0_TO_100_WITH_100_BEING_YOUR_LEVEL_OF_FUNCTION_PRIOR_TO_YOUR_HIP_PROBLEM_AND_0_BEING_THE_INABILITY_TO_PERFORM_ANY_OF_YOUR_USUAL_DAILY_ACTIVITIES?: 90
GETTING_INTO_AND_OUT_OF_A_BATHTUB: SLIGHT DIFFICULTY
WALKING_INITIALLY: NO DIFFICULTY AT ALL
HOW_WOULD_YOU_RATE_YOUR_CURRENT_LEVEL_OF_FUNCTION_DURING_YOUR_USUAL_ACTIVITIES_OF_DAILY_LIVING_FROM_0_TO_100_WITH_100_BEING_YOUR_LEVEL_OF_FUNCTION_PRIOR_TO_YOUR_HIP_PROBLEM_AND_0_BEING_THE_INABILITY_TO_PERFORM_ANY_OF_YOUR_USUAL_DAILY_ACTIVITIES?: 90
WALKING_DOWN_STEEP_HILLS: NO DIFFICULTY AT ALL
SPORTS_HIGHEST_POTENTIAL_SCORE: 36
LIGHT_TO_MODERATE_WORK: NO DIFFICULTY AT ALL
PLEASE_INDICATE_YOR_PRIMARY_REASON_FOR_REFERRAL_TO_THERAPY:: HIP
HOS_ADL_SCORE(%): 90.63
ABILITY_TO_PERFORM_ACTIVITY_WITH_YOUR_NORMAL_TECHNIQUE: NO DIFFICULTY AT ALL
WALKING_FOR_APPROXIMATELY_10_MINUTES: NO DIFFICULTY AT ALL
STANDING FOR 15 MINUTES: NO DIFFICULTY AT ALL
GOING UP 1 FLIGHT OF STAIRS: NO DIFFICULTY AT ALL
CUTTING/LATERAL_MOVEMENTS: SLIGHT DIFFICULTY
DEEP SQUATTING: NO DIFFICULTY AT ALL
ADL_TOTAL_ITEM_SCORE: 0
PUTTING_ON_SOCKS_AND_SHOES: NO DIFFICULTY AT ALL
TWISTING/PIVOTING_ON_INVOLVED_LEG: MODERATE DIFFICULTY
WALKING_APPROXIMATELY_10_MINUTES: NO DIFFICULTY AT ALL
WALKING_15_MINUTES_OR_GREATER: NO DIFFICULTY AT ALL
SITTING_FOR_15_MINUTES: NO DIFFICULTY AT ALL
WALKING_UP_STEEP_HILLS: NO DIFFICULTY AT ALL
STEPPING_UP_AND_DOWN_CURBS: NO DIFFICULTY AT ALL
ROLLING OVER IN BED: SLIGHT DIFFICULTY
ADL_SCORE(%): 0
JUMPING: SLIGHT DIFFICULTY

## 2024-07-19 ENCOUNTER — THERAPY VISIT (OUTPATIENT)
Dept: PHYSICAL THERAPY | Facility: CLINIC | Age: 59
End: 2024-07-19
Payer: COMMERCIAL

## 2024-07-19 DIAGNOSIS — M25.552 HIP PAIN, LEFT: Primary | ICD-10-CM

## 2024-07-19 PROCEDURE — 97161 PT EVAL LOW COMPLEX 20 MIN: CPT | Mod: GP | Performed by: PHYSICAL THERAPIST

## 2024-07-19 PROCEDURE — 97110 THERAPEUTIC EXERCISES: CPT | Mod: GP | Performed by: PHYSICAL THERAPIST

## 2024-07-19 NOTE — PROGRESS NOTES
[facility]   Physical Therapy Treatment and Discharge Note      [facility]  Cl[facility] Number: [id number]    Therapy Diagnosis:   Encounter Diagnoses   Name Primary?    Pelvic floor dysfunction Yes    Weakness     Other lack of coordination        Physician: Kurtis Salvador,*    Visit Date: 6/3/2024    Physician Orders: PT Eval and Treat   Medical Diagnosis from Referral: urge urinary incontinence   Evaluation Date: 3/26/2024  Authorization Period Expiration: 12-31-24  Plan of Care Expiration: 6-18-24  Progress Note Due: 4-26-24  Visit # / Visits authorized: 8/ 20    FOTO: 3/3  Date last given: 5/20/2024    Time In: 1415  Time Out: 1500  Total Billable Time: 45 minutes    Precautions: Standard    Subjective     Pt reports: Grace reports that she is doing much better and is no longer having urinary symptom(s).        She was compliant with home exercise program.  Response to previous treatment: no issues reported   Functional change: no changes reported    Pain: 0/10  Location: NA    Constitutional Symptoms Review: The patient denies having any constitutional symptoms.       Objective      Objective Measures updated at progress report unless specified.       Treatment   Grace received the treatments listed below:    Pt verbally consents to intravaginal treatment today.  Signed consent form already on file.  Chaperone declined today.      Grace participated in neuromuscular re-education activities to develop Coordination, Control, and Down training for 20 minutes including:   [x] Kegel edu and practice reviewed verbally  [x] Posterior pelvic tilt 5 sec x 20 reps.  Verbal and visual cues needed for technique.    [x] Transverse abdominus muscle +Kegel 5 sec x 10 reps  [x] Transverse abdominus muscle +Kegel+BKFO/Marching x10 reps.   Cues needed for correct TA activation and coordination with kegel.        Grace participated in dynamic functional therapeutic  PHYSICAL THERAPY EVALUATION  Type of Visit: Evaluation       Fall Risk Screen:  Fall screen completed by: PT  Have you fallen 2 or more times in the past year?: No  Have you fallen and had an injury in the past year?: No  Is patient a fall risk?: No    Subjective       Presenting condition or subjective complaint: Left hip pain  Date of onset:      Relevant medical history: Menopause   Dates & types of surgery:  , ; Thyroidectomy     Prior diagnostic imaging/testing results: Bone scan     Prior therapy history for the same diagnosis, illness or injury: Yes  PT    Doing a swim track in [facility] for 59 th birthday.  Going to do 3 K a day.  X-rays in  reported as normal.  Mother had both hips relaxed    Prior Level of Function  Transfers:   Ambulation:   ADL:   IADL:     Living Environment  Social support: With a significant other or spouse   Type of home: House   Stairs to enter the home: Yes 4 Is there a railing: Yes     Ramp: No   Stairs inside the home: Yes       Help at home: None  Equipment owned:       Employment: Yes nonprofit   Hobbies/Interests: swimming, pickleball, sewing    Freestyle is fine but breaststroke is limited.  Belongs to [employer].  Going to yoga and swimming.  Playing pickelball once a week and hip is sore the morning after this.  Has osteopenia.    Patient goals for therapy: Maintain or regain some range of motion    Pain assessment:      Objective   HIP EVALUATION  PAIN: Pain Location: hip  Pain Quality: Sharp  Pain Frequency: intermittent  Pain is Exacerbated By: getting on bike and getting in the car  INTEGUMENTARY (edema, incisions):   POSTURE:   GAIT:   Weightbearing Status:   Assistive Device(s):   Gait Deviations:   BALANCE/PROPRIOCEPTION:   WEIGHTBEARING ALIGNMENT:   NON-WEIGHTBEARING ALIGNMENT:    ROM:   (Degrees) Left AROM Left PROM  Right AROM Right PROM   Hip Flexion  95 with end range pain  100   Hip Extension       Hip Abduction       Hip  Adduction       Hip Internal Rotation  5-10 degrees  20   Hip External Rotation  30  50   Knee Flexion       Knee Extension       Lumbar Side glide     Lumbar Flexion    Lumbar Extension    Pain:   End feel:     PELVIC/SI SCREEN:   STRENGTH: WNL  LE FLEXIBILITY:   SPECIAL TESTS:   FUNCTIONAL TESTS:   PALPATION:   + Tenderness At Location Left Right   Ischial Tuberosity     Greater Trochanter     IT Band     Hip Flexors     Piriformis     PSIS     ASIS     Adductors     Abductors     Iliac Crest     Glut Medius +    Bursa     Pubis       JOINT MOBILITY: hypomobility noted mostly with rotation and abduction and adduction, left hip only    Assessment & Plan   CLINICAL IMPRESSIONS  Medical Diagnosis: left hip pain    Treatment Diagnosis: left hip pain   Impression/Assessment: Patient is a 58 year old female with left hip complaints.  The following significant findings have been identified: Pain, Decreased ROM/flexibility, Decreased joint mobility, and Decreased activity tolerance. These impairments interfere with their ability to perform recreational activities, household mobility, and community mobility as compared to previous level of function.     Clinical Decision Making (Complexity):  Clinical Presentation: Stable/Uncomplicated  Clinical Presentation Rationale: based on medical and personal factors listed in PT evaluation  Clinical Decision Making (Complexity): Low complexity    PLAN OF CARE  Treatment Interventions:  Interventions: Neuromuscular Re-education, Therapeutic Activity, Therapeutic Exercise    Long Term Goals     PT Goal 1  Goal Identifier: HEP  Goal Description: patient to be indepent with HEP for hip ROM and strengthening exercises  Rationale: to maximize safety and independence with performance of ADLs and functional tasks  Goal Progress: patient instructed in program today and advised not to push into pain  Target Date: 07/19/24      Frequency of Treatment: one visit  Duration of Treatment: one  activities to improve functional performance for 25 minutes, including:  [x] Discharge planning   [x] Home exercise program reviewed in detail   [x] FOTO results reviewed in detail  [x] Sit to stand with kegel x 15 reps  [x] Wall push ups with kegel x 10 reps       Home Exercises Provided and Patient Education Provided     Education provided: See above  Discussed progression of plan of care with patient; educated pt in activity modification; reviewed HEP with pt. Pt demonstrated and verbalized understanding of all instruction and was provided with a handout of HEP (see Patient Instructions).    Written Home Exercises Provided: yes.  Exercises were reviewed and Grace was able to demonstrate them prior to the end of the session.  Grace demonstrated good  understanding of the education provided.     See EMR under Patient Instructions for exercises provided 3/28/2024.    Assessment     Grace has made excellent progress with participation in the POC towards reduction of urinary symptom(s) with ADLs.  She has met the majority of her goals and is independent with her home program.  At this time Grace no longer requires skilled intervention and is appropriate for discharge.       Discharge reason: Patient has met all of his/her goals and Patient has reached the maximum rehab potential for the present time    Discharge FOTO Score:       Goals:   Short Term Goals: 4 weeks   Patient to demonstrate proper use of urge delay strategies to help reduce urge urinary incontinence with activities of daily living. Met   Pt to voice understanding of the role that diet plays on urinary urgency.  Met   Pt to be compliant with a voiding schedule of every 3 hours to help decrease urge urinary incontinence with activities of daily living.  Met   Pt will report minimal to no pain with single digit pelvic assessment and display relaxation during this assessment in order to progress to dilator use. Met         Long Term Goals: 12  visit    Recommended Referrals to Other Professionals:   Education Assessment:   Learner/Method: Patient;Listening;Reading;Demonstration;Pictures/Video;No Barriers to Learning    Risks and benefits of evaluation/treatment have been explained.   Patient/Family/caregiver agrees with Plan of Care.     Evaluation Time:     PT Eval, Low Complexity Minutes (73207): 15       Signing Clinician: Pastora Alexander PT             weeks ,   Pt to be discharged with home plan for carry over after discharge.    Pt to report an 80% reduction of urinary incontinence symptoms with ADL participation thereby demonstrating improved pelvic floor muscle control and strength.   Pt to demonstrate an improved score in the FOTO Urinary Problem survey  to at least 77 to demonstrate improving pelvic floor muscle function and coordination needed for improved bladder control with ADLs.    Pt to be able to delay the urge to urinate at least 10 minutes with a strong urge to urinate in order to make it to the bathroom without leaking. Met   Pt to report no longer feeling the need to urinate just in case when shopping or participating in social activities to demonstrate improving pelvic floor and bladder control. Met         PLAN      Plan of care Certification: 3/26/2024 to 6-18-24.     Outpatient Physical Therapy 1-2 times weekly for 12 weeks to include the following interventions: therapeutic exercises, therapeutic activity, neuromuscular re-education, gait training, manual therapy, modalities PRN, patient/family education, and self care/home management, and dry needling.   Continue with established Plan of Care, working toward established PT goals.      At next visit: urge delay strategies     Lisa Gautam, PT

## 2024-07-26 ENCOUNTER — ANCILLARY PROCEDURE (OUTPATIENT)
Dept: ULTRASOUND IMAGING | Facility: CLINIC | Age: 59
End: 2024-07-26
Attending: INTERNAL MEDICINE
Payer: COMMERCIAL

## 2024-07-26 DIAGNOSIS — E03.9 HYPOTHYROIDISM WITH GOITER: ICD-10-CM

## 2024-07-26 DIAGNOSIS — E04.9 HYPOTHYROIDISM WITH GOITER: ICD-10-CM

## 2024-07-26 PROCEDURE — 76536 US EXAM OF HEAD AND NECK: CPT | Mod: GC | Performed by: STUDENT IN AN ORGANIZED HEALTH CARE EDUCATION/TRAINING PROGRAM

## 2024-08-07 ENCOUNTER — LAB (OUTPATIENT)
Dept: LAB | Facility: CLINIC | Age: 59
End: 2024-08-07
Payer: COMMERCIAL

## 2024-08-07 ENCOUNTER — PRE VISIT (OUTPATIENT)
Dept: ENDOCRINOLOGY | Facility: CLINIC | Age: 59
End: 2024-08-07

## 2024-08-07 ENCOUNTER — OFFICE VISIT (OUTPATIENT)
Dept: ENDOCRINOLOGY | Facility: CLINIC | Age: 59
End: 2024-08-07
Attending: INTERNAL MEDICINE
Payer: COMMERCIAL

## 2024-08-07 VITALS
WEIGHT: 155 LBS | HEART RATE: 71 BPM | BODY MASS INDEX: 24.33 KG/M2 | DIASTOLIC BLOOD PRESSURE: 73 MMHG | OXYGEN SATURATION: 98 % | HEIGHT: 67 IN | SYSTOLIC BLOOD PRESSURE: 111 MMHG

## 2024-08-07 DIAGNOSIS — M85.88 OSTEOPENIA OF LUMBAR SPINE: ICD-10-CM

## 2024-08-07 DIAGNOSIS — E04.9 HYPOTHYROIDISM WITH GOITER: ICD-10-CM

## 2024-08-07 DIAGNOSIS — E06.3 HYPOTHYROIDISM DUE TO HASHIMOTO'S THYROIDITIS: ICD-10-CM

## 2024-08-07 DIAGNOSIS — E04.1 THYROID NODULE: ICD-10-CM

## 2024-08-07 DIAGNOSIS — E03.9 HYPOTHYROIDISM WITH GOITER: ICD-10-CM

## 2024-08-07 DIAGNOSIS — D49.7 NONINVASIVE FOLLICULAR NEOPLASM OF THYROID WITH PAPILLARY-LIKE NUCLEAR FEATURES: Primary | ICD-10-CM

## 2024-08-07 LAB
ALBUMIN SERPL BCG-MCNC: 4.4 G/DL (ref 3.5–5.2)
BUN SERPL-MCNC: 15.2 MG/DL (ref 8–23)
CALCIUM SERPL-MCNC: 10 MG/DL (ref 8.8–10.4)
CREAT SERPL-MCNC: 0.72 MG/DL (ref 0.51–0.95)
EGFRCR SERPLBLD CKD-EPI 2021: >90 ML/MIN/1.73M2
PHOSPHATE SERPL-MCNC: 3.6 MG/DL (ref 2.5–4.5)
PTH-INTACT SERPL-MCNC: 28 PG/ML (ref 15–65)
TSH SERPL DL<=0.005 MIU/L-ACNC: 0.79 UIU/ML (ref 0.3–4.2)
VIT D+METAB SERPL-MCNC: 60 NG/ML (ref 20–50)

## 2024-08-07 PROCEDURE — 86376 MICROSOMAL ANTIBODY EACH: CPT | Performed by: INTERNAL MEDICINE

## 2024-08-07 PROCEDURE — 36415 COLL VENOUS BLD VENIPUNCTURE: CPT | Performed by: PATHOLOGY

## 2024-08-07 PROCEDURE — 82306 VITAMIN D 25 HYDROXY: CPT | Performed by: INTERNAL MEDICINE

## 2024-08-07 PROCEDURE — 84520 ASSAY OF UREA NITROGEN: CPT | Performed by: PATHOLOGY

## 2024-08-07 PROCEDURE — 84443 ASSAY THYROID STIM HORMONE: CPT | Performed by: PATHOLOGY

## 2024-08-07 PROCEDURE — 86364 TISS TRNSGLTMNASE EA IG CLAS: CPT | Mod: 59 | Performed by: INTERNAL MEDICINE

## 2024-08-07 PROCEDURE — 82040 ASSAY OF SERUM ALBUMIN: CPT | Performed by: PATHOLOGY

## 2024-08-07 PROCEDURE — 82310 ASSAY OF CALCIUM: CPT | Performed by: PATHOLOGY

## 2024-08-07 PROCEDURE — 84100 ASSAY OF PHOSPHORUS: CPT | Performed by: PATHOLOGY

## 2024-08-07 PROCEDURE — G2211 COMPLEX E/M VISIT ADD ON: HCPCS | Performed by: INTERNAL MEDICINE

## 2024-08-07 PROCEDURE — 82565 ASSAY OF CREATININE: CPT | Performed by: PATHOLOGY

## 2024-08-07 PROCEDURE — 99000 SPECIMEN HANDLING OFFICE-LAB: CPT | Performed by: PATHOLOGY

## 2024-08-07 PROCEDURE — 83970 ASSAY OF PARATHORMONE: CPT | Performed by: PATHOLOGY

## 2024-08-07 PROCEDURE — 99215 OFFICE O/P EST HI 40 MIN: CPT | Performed by: INTERNAL MEDICINE

## 2024-08-07 RX ORDER — LEVOTHYROXINE SODIUM 125 UG/1
TABLET ORAL
Qty: 90 TABLET | Refills: 3 | Status: SHIPPED | OUTPATIENT
Start: 2024-08-07

## 2024-08-07 ASSESSMENT — PAIN SCALES - GENERAL: PAINLEVEL: MILD PAIN (3)

## 2024-08-07 NOTE — PATIENT INSTRUCTIONS
"Levothyroxine should be taken on empty stomach and wait at least 30 minutes before eating. Don't take supplements or multivitamins containing iron and calcium within 4 hours of taking levothyroxine tablet.     Brief description of what you need to do:  Do the test on a day off, so you can stay home.  Wake up and flush the first urine.  Then collect all the urine for that day, evening and overnight if you wake to urinate. Plus the first urine of the next morning.    For women,  the lab should also give you a \"hat\" which is a device to help collect the urine and to pour it into the collection jug.   The urine must be kept cool, not frozen.  So, do not put it out on the porch.  Keep it in the fridge.    Weight bearing Exercises  Fall prevention  Adequate Calcium and vitamin D intake: for maintenance calcium 1200 mg daily from all sources and vitamin D 1000 IU daily.    Work up for secondary causes   Orders Placed This Encounter   Procedures    Thyroid peroxidase antibody    TSH with free T4 reflex    Vitamin D Deficiency (D3 Only)    Albumin level    Phosphorus    Parathyroid Hormone Intact    Calcium    Urea nitrogen    Creatinine    Tissue transglutaminase ilia IgA and IgG    Calcium timed urine    Creatinine timed urine          "

## 2024-08-07 NOTE — PROGRESS NOTES
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  No additional follow-up needed.    Thyroid nodule: I have personally reviewed thyroid ultrasound from 7/26/2024 and have compared it to the previous study.  I agree with the interpretation of 2 discrete nodules involving the right thyroid lobe.  The first nodule measures 1 x 0.9 x 0.7 cm.  This nodule is isoechoic with no high risk features.  The second nodule measures 0.5 x 0.4 x 0.3 cm which is also isoechoic nodule with no high risk features.  Both these nodules are low risk features and have been stable over the last 4 years.  To document 5-year stability obtain thyroid ultrasound in 2026 and we can stop follow-up regarding of these nodules after that.    Hypothyroidism  TSH well within the range.  Continue current management. Levothyroxine should be taken on empty stomach and wait at least 30 minutes before eating. Don't take supplements or multivitamins containing iron and calcium within 4 hours of taking levothyroxine tablet.     Osteopenia  I have personally reviewed DEXA scan from 4/11/2024.  I agree with the interpretation of lumbar spine T-score L1-L4 -2, left and right femoral neck T-score is -1.3 and -1.7 respectively and left and right total hip -1.5 and -1.7 respectively.  Overall, bone density in the osteopenia range.      Plan    Weight bearing Exercises  Fall prevention  Adequate Calcium and vitamin D intake: for maintenance calcium 1200 mg daily from all sources and vitamin D 1000 IU daily.    Work up for secondary causes   Anegla D high; reduce total intake by 20-25%.     Test and/or medications prescribed today:  Orders Placed This Encounter   Procedures    Thyroid peroxidase antibody    TSH with free T4 reflex    Vitamin D Deficiency (D3 Only)    Albumin  level    Phosphorus    Parathyroid Hormone Intact    Calcium    Urea nitrogen    Creatinine    Tissue transglutaminase ilia IgA and IgG    Calcium timed urine    Creatinine timed urine         Lis Ann MD  Staff Endocrinologist    Division of Endocrinology and Diabetes      Subjective:         HPI: Earnestine Mcpherson is a 58 year old female with history of thyroid nodule, hypothyroidism, osteopenia who is seen in consultation at Trish Aguilar's request for the same.    Questions regarding management of thyroid nodule, hypothyroidism and previously low risk thyroid cancer pathology.  Component      Latest Ref Rng 8/7/2024  11:58 AM   Creatinine      0.51 - 0.95 mg/dL 0.72    GFR Estimate      >60 mL/min/1.73m2 >90    TSH      0.30 - 4.20 uIU/mL 0.79    Vitamin D, Total (25-Hydroxy)      20 - 50 ng/mL 60 (H)    Albumin      3.5 - 5.2 g/dL 4.4    Phosphorus      2.5 - 4.5 mg/dL 3.6    Parathyroid Hormone Intact      15 - 65 pg/mL 28    Calcium      8.8 - 10.4 mg/dL 10.0    Urea Nitrogen      8.0 - 23.0 mg/dL 15.2         Allergies   Allergen Reactions    Penicillins      Breathing problems       Current Outpatient Medications   Medication Sig Dispense Refill    calcium carbonate 500 mg, elemental, (OSCAL 500) 1250 (500 Ca) MG TABS tablet Take by mouth daily      ferrous sulfate (FEROSUL) 325 (65 Fe) MG tablet       levothyroxine (SYNTHROID/LEVOTHROID) 125 MCG tablet Take one daily Mon - Sat then take 1.5 tablets on Sunday 90 tablet 3    vitamin C (ASCORBIC ACID) 100 MG tablet       valACYclovir (VALTREX) 1000 mg tablet Take 2 tablets (2,000 mg) by mouth 2 times daily for 6 days At first sign of outbreak. 12 tablet 0       Review of Systems     8 point review system (Constitutional, HENT, Eyes, Respiratory, Cardiovascular, Gastrointestinal, Genitourinary, Musculoskeletal,Neurological, Psychiatric/Behavioural, Endocrine) is negative or is as per HPI above.  Past medical history, past surgical history,  social and family history reviewed.    Past Medical History:   Diagnosis Date    Thyroid disease        Past Surgical History:   Procedure Laterality Date     SECTION       SECTION      COLONOSCOPY  2016    due every 5 yr    COLONOSCOPY N/A 7/15/2021    Procedure: COLONOSCOPY, WITH POLYPECTOMY;  Surgeon: Ramila Gay MD;  Location: [facility] OR    FINGER SURGERY Right 1966    right hand ring finger    THYROIDECTOMY Left 10/19/2022    Procedure: Thyroid Lobectomy - Left;  Surgeon: Adrienne Desai MD;  Location:  OR     Social Determinants of Health     Financial Resource Strain: Low Risk  (2024)    Financial Resource Strain     Within the past 12 months, have you or your family members you live with been unable to get utilities (heat, electricity) when it was really needed?: No   Food Insecurity: Low Risk  (2024)    Food Insecurity     Within the past 12 months, did you worry that your food would run out before you got money to buy more?: No     Within the past 12 months, did the food you bought just not last and you didn t have money to get more?: No   Transportation Needs: Low Risk  (2024)    Transportation Needs     Within the past 12 months, has lack of transportation kept you from medical appointments, getting your medicines, non-medical meetings or appointments, work, or from getting things that you need?: No   Interpersonal Safety: Low Risk  (2024)    Interpersonal Safety     Do you feel physically and emotionally safe where you currently live?: Yes     Within the past 12 months, have you been hit, slapped, kicked or otherwise physically hurt by someone?: No     Within the past 12 months, have you been humiliated or emotionally abused in other ways by your partner or ex-partner?: No   Housing Stability: Low Risk  (2024)    Housing Stability     Do you have housing? : Yes     Are you worried about losing your housing?: No       Objective:   /73    "Pulse 71   Ht 1.702 m (5' 7\")   Wt 70.3 kg (155 lb)   SpO2 98%   BMI 24.28 kg/m    Constitutional: Pleasant no acute cardiopulmonary distress.   EYES: anicteric, normal extra-ocular movements, no lid lag or retraction, is equal and reactive to light bilaterally.  HEENT: Mouth/Throat: Mucous membrane is moist. Oropharynx is clear.  Thyroid examination: Left thyroid surgically absent.  Right thyroid nodule is not palpable on exam today.  Cardiovascular: RRR, S1, S2 normal.   Pulmonary/Chest: CTAB. No wheezing or rales.   Abdominal: +BS. Non tender to palpation.  Stretch marks:   Neurological: Alert and oriented.  No tremor and reflexes are symmetrical bilaterally and within the normal limits. Muscle strength 5/5.   Extremities: No edema.  Psychological: appropriate mood and affect     In House Labs:   Lab Results   Component Value Date    A1C 5.4 01/24/2024    A1C 5.3 08/13/2021    A1C 5.1 09/18/2019       TSH   Date Value Ref Range Status   08/07/2024 0.79 0.30 - 4.20 uIU/mL Final   03/08/2024 1.08 0.30 - 4.20 uIU/mL Final   01/24/2024 2.89 0.30 - 4.20 uIU/mL Final   03/09/2023 10.20 (H) 0.30 - 4.20 uIU/mL Final   04/20/2022 2.83 0.40 - 4.00 mU/L Final   08/13/2021 2.62 0.40 - 4.00 mU/L Final   06/08/2020 2.93 0.40 - 4.00 mU/L Final   02/24/2020 6.16 (H) 0.40 - 4.00 mU/L Final   11/01/2019 5.81 (H) 0.40 - 4.00 mU/L Final   09/18/2019 7.43 (H) 0.40 - 4.00 mU/L Final     T4 Free   Date Value Ref Range Status   02/24/2020 0.94 0.76 - 1.46 ng/dL Final   11/01/2019 0.86 0.76 - 1.46 ng/dL Final   09/18/2019 0.66 (L) 0.76 - 1.46 ng/dL Final     Free T4   Date Value Ref Range Status   03/09/2023 1.10 0.90 - 1.70 ng/dL Final       Creatinine   Date Value Ref Range Status   08/07/2024 0.72 0.51 - 0.95 mg/dL Final   ]    Recent Labs   Lab Test 05/10/24  0903 01/24/24  0930   CHOL 160 215*   HDL 69 61   LDL 79 139*   TRIG 58 73   \"  Narrative & Impression           Nodule 1:  Lobe: Right  Location: Mid  Size: 1.0 x 0.9 x 0.7 " "cm  Composition: Solid or almost completely solid (2 points)  Echogenicity: Hyperechoic or isoechoic (1 point)  Shape: Wider than tall (0 points)  Margin: Ill-defined (0 points)  Echogenic Foci: None or large comet tail artifact (0 points)  Stability: No significant change in size  TIRADS: TR3 (3 points)      Nodule 2:  Lobe: Right  Location: Superior  Size: 0.5 x 0.4 x 0.3 cm  Composition: Solid or almost completely solid (2 points)  Echogenicity: Hyperechoic or isoechoic (1 point)  Shape: Wider than tall (0 points)  Margin: Ill-defined (0 points)  Echogenic Foci: None or large comet tail artifact (0 points)  Stability: No significant change in size  TIRADS: TR3 (3 points)                                                                       Impression:   \"  \"Results   Lumbar spine   T-score -2.0 (L1-4), BMD is 0.942 g/cm2     Left femoral neck  T-score -1.3, BMD is 0.852 g/cm2     Right femoral neck  T-score -1.7, BMD is 0.796 g/cm2     Left total hip  T-score -1.5, BMD is 0.820 g/cm2     Right total hip  T-score -1.7, BMD is 0.793 g/cm2        Interval change  Based on historical least significant change data, bone density compared to the prior study has changed as follows:  - no significant change at the lumbar spine, left total hip, right total hip      Slight differences in positioning of the hips compared with the prior exam may affect observed changes in bone density.      Fracture risk   The estimated 10-year risk for a major osteoporotic fracture is 8.5% and for a hip fracture is 0.9%. \"    This note has been dictated using voice recognition software.  As a result, there may be errors in the documentation that have gone undetected.  Please consider this when interpreting information in this documentation.    43 minutes spent by me on the date of the encounter doing chart review, history and exam, documentation and further activities per the note. The longitudinal plan of care for the " diagnosis(es)/condition(s) as documented were addressed during this visit. Due to the added complexity in care, I will continue to support Earnestine in the subsequent management and with ongoing continuity of care.

## 2024-08-07 NOTE — LETTER
8/7/2024       RE: Earnestine Mcpherson  [address]  [place] [address]     Dear Colleague,    Thank you for referring your patient, Earnestine Mcpherson, to the [facility] [place] at [facility]. Please see a copy of my visit note below.    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  No additional follow-up needed.    Thyroid nodule: I have personally reviewed thyroid ultrasound from 7/26/2024 and have compared it to the previous study.  I agree with the interpretation of 2 discrete nodules involving the right thyroid lobe.  The first nodule measures 1 x 0.9 x 0.7 cm.  This nodule is isoechoic with no high risk features.  The second nodule measures 0.5 x 0.4 x 0.3 cm which is also isoechoic nodule with no high risk features.  Both these nodules are low risk features and have been stable over the last 4 years.  To document 5-year stability obtain thyroid ultrasound in 2026 and we can stop follow-up regarding of these nodules after that.    Hypothyroidism  TSH well within the range.  Continue current management. Levothyroxine should be taken on empty stomach and wait at least 30 minutes before eating. Don't take supplements or multivitamins containing iron and calcium within 4 hours of taking levothyroxine tablet.     Osteopenia  I have personally reviewed DEXA scan from 4/11/2024.  I agree with the interpretation of lumbar spine T-score L1-L4 -2, left and right femoral neck T-score is -1.3 and -1.7 respectively and left and right total hip -1.5 and -1.7 respectively.  Overall, bone density in the osteopenia range.      Plan    Weight bearing Exercises  Fall prevention  Adequate Calcium and vitamin D intake: for maintenance  calcium 1200 mg daily from all sources and vitamin D 1000 IU daily.    Work up for secondary causes   Angela D high; reduce total intake by 20-25%.     Test and/or medications prescribed today:  Orders Placed This Encounter   Procedures     Thyroid peroxidase antibody     TSH with free T4 reflex     Vitamin D Deficiency (D3 Only)     Albumin level     Phosphorus     Parathyroid Hormone Intact     Calcium     Urea nitrogen     Creatinine     Tissue transglutaminase ilia IgA and IgG     Calcium timed urine     Creatinine timed urine         Lis Ann MD  Staff Endocrinologist    Division of Endocrinology and Diabetes      Subjective:         HPI: Earnestine Mcpherson is a 58 year old female with history of thyroid nodule, hypothyroidism, osteopenia who is seen in consultation at Trish Aguilar's request for the same.    Questions regarding management of thyroid nodule, hypothyroidism and previously low risk thyroid cancer pathology.  Component      Latest Ref Rng 8/7/2024  11:58 AM   Creatinine      0.51 - 0.95 mg/dL 0.72    GFR Estimate      >60 mL/min/1.73m2 >90    TSH      0.30 - 4.20 uIU/mL 0.79    Vitamin D, Total (25-Hydroxy)      20 - 50 ng/mL 60 (H)    Albumin      3.5 - 5.2 g/dL 4.4    Phosphorus      2.5 - 4.5 mg/dL 3.6    Parathyroid Hormone Intact      15 - 65 pg/mL 28    Calcium      8.8 - 10.4 mg/dL 10.0    Urea Nitrogen      8.0 - 23.0 mg/dL 15.2         Allergies   Allergen Reactions     Penicillins      Breathing problems       Current Outpatient Medications   Medication Sig Dispense Refill     calcium carbonate 500 mg, elemental, (OSCAL 500) 1250 (500 Ca) MG TABS tablet Take by mouth daily       ferrous sulfate (FEROSUL) 325 (65 Fe) MG tablet        levothyroxine (SYNTHROID/LEVOTHROID) 125 MCG tablet Take one daily Mon - Sat then take 1.5 tablets on Sunday 90 tablet 3     vitamin C (ASCORBIC ACID) 100 MG tablet        valACYclovir (VALTREX) 1000 mg tablet Take 2 tablets (2,000 mg) by  mouth 2 times daily for 6 days At first sign of outbreak. 12 tablet 0       Review of Systems     8 point review system (Constitutional, HENT, Eyes, Respiratory, Cardiovascular, Gastrointestinal, Genitourinary, Musculoskeletal,Neurological, Psychiatric/Behavioural, Endocrine) is negative or is as per HPI above.  Past medical history, past surgical history, social and family history reviewed.    Past Medical History:   Diagnosis Date     Thyroid disease        Past Surgical History:   Procedure Laterality Date      SECTION        SECTION       COLONOSCOPY  2016    due every 5 yr     COLONOSCOPY N/A 7/15/2021    Procedure: COLONOSCOPY, WITH POLYPECTOMY;  Surgeon: Ramila Gay MD;  Location: [facility] OR     FINGER SURGERY Right 1966    right hand ring finger     THYROIDECTOMY Left 10/19/2022    Procedure: Thyroid Lobectomy - Left;  Surgeon: Adrienne Desai MD;  Location:  OR     Social Determinants of Health     Financial Resource Strain: Low Risk  (2024)    Financial Resource Strain      Within the past 12 months, have you or your family members you live with been unable to get utilities (heat, electricity) when it was really needed?: No   Food Insecurity: Low Risk  (2024)    Food Insecurity      Within the past 12 months, did you worry that your food would run out before you got money to buy more?: No      Within the past 12 months, did the food you bought just not last and you didn t have money to get more?: No   Transportation Needs: Low Risk  (2024)    Transportation Needs      Within the past 12 months, has lack of transportation kept you from medical appointments, getting your medicines, non-medical meetings or appointments, work, or from getting things that you need?: No   Interpersonal Safety: Low Risk  (2024)    Interpersonal Safety      Do you feel physically and emotionally safe where you currently live?: Yes      Within the past 12 months, have you been  "hit, slapped, kicked or otherwise physically hurt by someone?: No      Within the past 12 months, have you been humiliated or emotionally abused in other ways by your partner or ex-partner?: No   Housing Stability: Low Risk  (1/17/2024)    Housing Stability      Do you have housing? : Yes      Are you worried about losing your housing?: No       Objective:   /73   Pulse 71   Ht 1.702 m (5' 7\")   Wt 70.3 kg (155 lb)   SpO2 98%   BMI 24.28 kg/m    Constitutional: Pleasant no acute cardiopulmonary distress.   EYES: anicteric, normal extra-ocular movements, no lid lag or retraction, is equal and reactive to light bilaterally.  HEENT: Mouth/Throat: Mucous membrane is moist. Oropharynx is clear.  Thyroid examination: Left thyroid surgically absent.  Right thyroid nodule is not palpable on exam today.  Cardiovascular: RRR, S1, S2 normal.   Pulmonary/Chest: CTAB. No wheezing or rales.   Abdominal: +BS. Non tender to palpation.  Stretch marks:   Neurological: Alert and oriented.  No tremor and reflexes are symmetrical bilaterally and within the normal limits. Muscle strength 5/5.   Extremities: No edema.  Psychological: appropriate mood and affect     In House Labs:   Lab Results   Component Value Date    A1C 5.4 01/24/2024    A1C 5.3 08/13/2021    A1C 5.1 09/18/2019       TSH   Date Value Ref Range Status   08/07/2024 0.79 0.30 - 4.20 uIU/mL Final   03/08/2024 1.08 0.30 - 4.20 uIU/mL Final   01/24/2024 2.89 0.30 - 4.20 uIU/mL Final   03/09/2023 10.20 (H) 0.30 - 4.20 uIU/mL Final   04/20/2022 2.83 0.40 - 4.00 mU/L Final   08/13/2021 2.62 0.40 - 4.00 mU/L Final   06/08/2020 2.93 0.40 - 4.00 mU/L Final   02/24/2020 6.16 (H) 0.40 - 4.00 mU/L Final   11/01/2019 5.81 (H) 0.40 - 4.00 mU/L Final   09/18/2019 7.43 (H) 0.40 - 4.00 mU/L Final     T4 Free   Date Value Ref Range Status   02/24/2020 0.94 0.76 - 1.46 ng/dL Final   11/01/2019 0.86 0.76 - 1.46 ng/dL Final   09/18/2019 0.66 (L) 0.76 - 1.46 ng/dL Final     Free T4 " "  Date Value Ref Range Status   03/09/2023 1.10 0.90 - 1.70 ng/dL Final       Creatinine   Date Value Ref Range Status   08/07/2024 0.72 0.51 - 0.95 mg/dL Final   ]    Recent Labs   Lab Test 05/10/24  0903 01/24/24  0930   CHOL 160 215*   HDL 69 61   LDL 79 139*   TRIG 58 73   \"  Narrative & Impression           Nodule 1:  Lobe: Right  Location: Mid  Size: 1.0 x 0.9 x 0.7 cm  Composition: Solid or almost completely solid (2 points)  Echogenicity: Hyperechoic or isoechoic (1 point)  Shape: Wider than tall (0 points)  Margin: Ill-defined (0 points)  Echogenic Foci: None or large comet tail artifact (0 points)  Stability: No significant change in size  TIRADS: TR3 (3 points)      Nodule 2:  Lobe: Right  Location: Superior  Size: 0.5 x 0.4 x 0.3 cm  Composition: Solid or almost completely solid (2 points)  Echogenicity: Hyperechoic or isoechoic (1 point)  Shape: Wider than tall (0 points)  Margin: Ill-defined (0 points)  Echogenic Foci: None or large comet tail artifact (0 points)  Stability: No significant change in size  TIRADS: TR3 (3 points)                                                                       Impression:   \"  \"Results   Lumbar spine   T-score -2.0 (L1-4), BMD is 0.942 g/cm2     Left femoral neck  T-score -1.3, BMD is 0.852 g/cm2     Right femoral neck  T-score -1.7, BMD is 0.796 g/cm2     Left total hip  T-score -1.5, BMD is 0.820 g/cm2     Right total hip  T-score -1.7, BMD is 0.793 g/cm2        Interval change  Based on historical least significant change data, bone density compared to the prior study has changed as follows:  - no significant change at the lumbar spine, left total hip, right total hip      Slight differences in positioning of the hips compared with the prior exam may affect observed changes in bone density.      Fracture risk   The estimated 10-year risk for a major osteoporotic fracture is 8.5% and for a hip fracture is 0.9%. \"    This note has been dictated using voice " recognition software.  As a result, there may be errors in the documentation that have gone undetected.  Please consider this when interpreting information in this documentation.    43 minutes spent by me on the date of the encounter doing chart review, history and exam, documentation and further activities per the note. The longitudinal plan of care for the diagnosis(es)/condition(s) as documented were addressed during this visit. Due to the added complexity in care, I will continue to support Earnestine in the subsequent management and with ongoing continuity of care.      Again, thank you for allowing me to participate in the care of your patient.      Sincerely,    Lis Ann MD

## 2024-08-08 LAB
THYROPEROXIDASE AB SERPL-ACNC: 337 IU/ML
TTG IGA SER-ACNC: 1.5 U/ML
TTG IGG SER-ACNC: <0.6 U/ML

## 2024-08-08 NOTE — RESULT ENCOUNTER NOTE
Hello -    #1 The TSH result is within the desired range.  This suggests that you are currently on appropriate dose of levothyroxine.  Please continue your current levothyroxine dose.  2.  The thyroid peroxidase antibody suggest that the underlying cause for hypothyroidism is Hashimoto's thyroiditis.  3.  The vitamin D level is above the desired range.  Please reduce your weekly vitamin D supplement by 25%(for example taking vitamin D 5 days a week instead of 7 days a week].  4.  The screening test which was done for celiac disease as part of workup for osteopenia is within the normal limits.  7 Parathyroid hormone, calcium, phosphorus, kidney function results are within the expected range.    Please let us know if you have any questions or concerns.     Regards,  Lis Ann MD

## 2024-10-29 ENCOUNTER — TRANSFERRED RECORDS (OUTPATIENT)
Dept: HEALTH INFORMATION MANAGEMENT | Facility: CLINIC | Age: 59
End: 2024-10-29
Payer: COMMERCIAL

## 2024-11-05 ENCOUNTER — TELEPHONE (OUTPATIENT)
Dept: PULMONOLOGY | Facility: CLINIC | Age: 59
End: 2024-11-05
Payer: COMMERCIAL

## 2024-11-05 NOTE — TELEPHONE ENCOUNTER
I contacted Earnestine regarding her referral for genetic counseling and carrier screening for cystic fibrosis.  Earnestine's adult niece has cystic fibrosis and Earnestine is interested in determining her carrier status to provide information for her children.  A virtual genetic counseling visit was scheduled at Earnestine's convenience and she was encouraged to call back with additional questions or concerns in the meantime.       Palmira Jiménez MS, [facility]  Genetic Counselor  [facility] [place] [facility]  [facility]

## 2024-11-20 ENCOUNTER — MYC REFILL (OUTPATIENT)
Dept: URGENT CARE | Facility: CLINIC | Age: 59
End: 2024-11-20

## 2024-11-20 ENCOUNTER — VIRTUAL VISIT (OUTPATIENT)
Dept: PULMONOLOGY | Facility: CLINIC | Age: 59
End: 2024-11-20
Attending: INTERNAL MEDICINE
Payer: COMMERCIAL

## 2024-11-20 DIAGNOSIS — Z71.83 ENCOUNTER FOR NONPROCREATIVE GENETIC COUNSELING: ICD-10-CM

## 2024-11-20 DIAGNOSIS — Z83.49 FAMILY HISTORY OF CYSTIC FIBROSIS: Primary | ICD-10-CM

## 2024-11-20 DIAGNOSIS — Z82.79 FAMILY HISTORY OF CONGENITAL OR GENETIC CONDITION: ICD-10-CM

## 2024-11-20 DIAGNOSIS — B00.1 HERPES LABIALIS: ICD-10-CM

## 2024-11-20 DIAGNOSIS — Z13.9 SCREENING FOR CONDITION: ICD-10-CM

## 2024-11-20 PROCEDURE — 96040 HC GENETIC COUNSELING, EACH 30 MINUTES: CPT | Mod: GT,95 | Performed by: GENETIC COUNSELOR, MS

## 2024-11-20 RX ORDER — VALACYCLOVIR HYDROCHLORIDE 1 G/1
2000 TABLET, FILM COATED ORAL 2 TIMES DAILY
Qty: 12 TABLET | Refills: 0 | Status: CANCELLED | OUTPATIENT
Start: 2024-11-20

## 2024-11-20 NOTE — NURSING NOTE
Earnestine Mcpherson complains of    Chief Complaint   Patient presents with    Consult     GC.       Patient would like the video invitation sent by: Other e-mail: [email or web address].      Patient is located in [place]? Yes     I have reviewed and updated the patient's medication list, allergies and preferred pharmacy.      Bria Meyers, EMT.

## 2024-11-20 NOTE — LETTER
11/20/2024      RE: Earnestine Mcpherson  [address]  [facility] [address]     Dear Colleague,    Thank you for the opportunity to participate in the care of your patient, Earnestine Mcpherson, at the [facility] at [facility]. Please see a copy of my visit note below.        Virtual Visit Details    Type of service:  Video Visit   Video Visit Start Time: 2:01pm  Video Visit End Time: 2:30pm    Originating Location (pt. Location): Home    Distant Location (provider location):  On-site  Platform used for Video Visit: [employer]      Presenting Information:  Earnestine Mcpherson is a 59-year-old woman with a family history of cystic fibrosis.  She was referred for genetic counseling and testing by her primary care provider, Dr. Trish Aguilar.  During today's visit I collected a family history, reviewed the genetics and inheritance of cystic fibrosis, and discussed the options for carrier screening.  At the conclusion of our visit Earnestine did wish to proceed with carrier screening for cystic fibrosis and informed consent was obtained.     Family History:  A detailed pedigree was obtained and scanned into the electronic medical record.  It is significant for the following:   Earnestine herself is healthy.   Earnestine has two daughters and a son, all of whom are healthy.    Earnestine does not yet have any grandchildren but her oldest daughter is 31-years-old and pregnant.   Earnestine has two sisters and two brothers, all of whom are generally healthy although one brother does have diabetes.   One of Earnestine's sister's daughters, age 31, has cystic fibrosis.  This woman had a lung transplant approximately 4 years ago.  Per Earnestine's report this woman has not been eligible for CFTR-modulator therapy.  Earnestine's mother is 87-years-old and has glaucoma.   Earnestine's father is 88-years-old and has diabetes.    Earnestine is of [place] and [place] ancestry  on her maternal side and [place]i ancestry on her paternal side.  Consanguinity was denied.     Discussion:  Today we reviewed that genes are long stretches of DNA that are responsible for how our bodies look and how our bodies work. We all have two copies of most genes; one inherited from the mother and one inherited from the father. When there is a change, called a mutation, in a gene it can cause it to not do its job correctly which can cause the signs and symptoms of a genetic condition. Cystic fibrosis is caused by mutations in a gene called CFTR.      Cystic fibrosis is inherited in an autosomal recessive pattern. This means that to be affected an individual must inherit a mutation on both copies of the CFTR gene (one from each parent). Individuals with just one mutation in the CFTR gene are said to be carriers. Carriers do not have cystic fibrosis but can have an affected child if their partner is also a carrier. When both parents are carriers, with each pregnancy there is a 25% chance for the child to be affected, a 50% chance for the child to be a carrier only, and a 25% chance for the child to be unaffected and not a carrier.      As noted above, Earnestine's niece has cystic fibrosis, making Earnestine's sister a carrier.  Earnestine herself therefore has a 50% chance to also be a carrier.  The most informative way to proceed with carrier screening would be to obtain a copy of Earnestine's niece's genetic testing report to ensure the familial mutations are included in any carrier screen ordered.  This was especially important given Earnestine's report that her niece is not currently eligible for CFTR-modulator therapy, making it likely that she has more rare or atypical CFTR mutations.  Earnestine indicated that obtaining a copy of the report would not be possible.  We reviewed that without this information there is a small chance for a false negative result.  Earnestine expressed understanding.    Prior to testing, Earnestine's  children each have a 1/4 (25%) chance to be a CF carrier based on the family history.  If their partner is also a carrier, with each pregnancy there would be a 25% chance for both parents to pass down a mutation and for the child to have cystic fibrosis.  Earnestine noted that her pregnant daughter's partner is of  ancestry.  Cystic fibrosis is significantly less common in this population with an estimated carrier frequency of roughly 1/100.  Therefore without any further testing of any relatives, the couple's chance to have a child with cystic fibrosis is quite low, approximately 1/1600  Earnestine's other children may have a higher chance to have a child with CF depending on the ancestry of their partner(s).  If Earnestine is found to be a carrier, carrier testing would be available to her children and/or their partners to further inform these risks.       We reviewed the options for carrier screening including for the CFTR gene only, or expanded carrier screening for a large panel of genes in addition to the CFTR gene.  We had a detailed discussion about the potential costs, benefits, and limitations of the options.  Our conversation included the reality that genetic testing is not perfect, and a normal result does not completely exclude the possibility of being a carrier.  There is also a possibility of identification of a milder CFTR variant associated with variable symptoms and/or a milder CFTR-related disorder.  This testing is designed to offer information for reproductive purposes, but can incidentally identify information that may be relevant to Earnestine's own health.  We also discussed that this screen will not offer information about other untested conditions.  There are also genetic conditions not inherited from a parent that are new in a child, as well as health conditions not genetic in nature.  After discussion Earnestine opted to proceed with carrier screening for the CFTR gene only.  Verbal informed  consent was obtained and documented.      Carrier screening will be sent to [facility].  Because we do not know the familial mutations and because there may be a higher chance for them to be more rare/atypical mutations based on Earnestine's niece not being eligible for a CFTR modulator, I recommended sending the more comprehensive CFTR analysis which [facility] has performed at [facility] lab.  [facility] will contact provide Earnestine directly with an estimate of insurance benefits and option for self-pay billing for $350.  Earnestine preferred a blood draw over at-home collection and orders and requisition paperwork have been placed in her chart.  A sample can be collected at any [facility] lab.      Results are expected in approximately 2-3 weeks and I will contact Earnestine by telephone when results return.  With Earnestine's permission results will be withheld from [facility] [place] for 7 days.  Additional recommendations including possible carrier screening for other relatives will be made at that time.      I appreciate the opportunity to be a part of Earnestine's care today.  She is encouraged to contact me with any additional questions or concerns.     Plan:  1.  CFTR carrier screening facilitated by [facility] to be performed by [facility] for additional sensitivity  2.  Orders have been placed for Earnestine to have a blood sample drawn at any [facility] lab   3.  [facility] will provide Earnestine with an estimation of insurance benefits and option for self-pay billing  4.  I will contact Earnestine when results return, 2-3 weeks after sample collection   5.  Additional recommendations will be made following return of results       Palmira Jiménez MS, [facility]  Genetic Counselor  [facility]  [facility]           Please do not hesitate to contact me if you have any questions/concerns.     Sincerely,       Palmira Jiménez GC

## 2024-11-20 NOTE — PROGRESS NOTES
Virtual Visit Details    Type of service:  Video Visit   Video Visit Start Time: 2:01pm  Video Visit End Time: 2:30pm    Originating Location (pt. Location): Home    Distant Location (provider location):  On-site  Platform used for Video Visit: [employer]      Presenting Information:  Earnestine Mcpherson is a 59-year-old woman with a family history of cystic fibrosis.  She was referred for genetic counseling and testing by her primary care provider, Dr. Trish Aguilar.  During today's visit I collected a family history, reviewed the genetics and inheritance of cystic fibrosis, and discussed the options for carrier screening.  At the conclusion of our visit Earnestine did wish to proceed with carrier screening for cystic fibrosis and informed consent was obtained.     Family History:  A detailed pedigree was obtained and scanned into the electronic medical record.  It is significant for the following:   Earnestine herself is healthy.   Earnestine has two daughters and a son, all of whom are healthy.    Earnestine does not yet have any grandchildren but her oldest daughter is 31-years-old and pregnant.   Earnestine has two sisters and two brothers, all of whom are generally healthy although one brother does have diabetes.   One of Earnestine's sister's daughters, age 31, has cystic fibrosis.  This woman had a lung transplant approximately 4 years ago.  Per Earnestine's report this woman has not been eligible for CFTR-modulator therapy.  Earnestine's mother is 87-years-old and has glaucoma.   Earnestine's father is 88-years-old and has diabetes.    Earnestine is of [place] and [place] ancestry on her maternal side and [place] ancestry on her paternal side.  Consanguinity was denied.     Discussion:  Today we reviewed that genes are long stretches of DNA that are responsible for how our bodies look and how our bodies work. We all have two copies of most genes; one inherited from the mother and one inherited from the father. When there is a change, called a  mutation, in a gene it can cause it to not do its job correctly which can cause the signs and symptoms of a genetic condition. Cystic fibrosis is caused by mutations in a gene called CFTR.      Cystic fibrosis is inherited in an autosomal recessive pattern. This means that to be affected an individual must inherit a mutation on both copies of the CFTR gene (one from each parent). Individuals with just one mutation in the CFTR gene are said to be carriers. Carriers do not have cystic fibrosis but can have an affected child if their partner is also a carrier. When both parents are carriers, with each pregnancy there is a 25% chance for the child to be affected, a 50% chance for the child to be a carrier only, and a 25% chance for the child to be unaffected and not a carrier.      As noted above, Earnestine's niece has cystic fibrosis, making Earnestine's sister a carrier.  Earnestine herself therefore has a 50% chance to also be a carrier.  The most informative way to proceed with carrier screening would be to obtain a copy of Earnestine's niece's genetic testing report to ensure the familial mutations are included in any carrier screen ordered.  This was especially important given Earnestine's report that her niece is not currently eligible for CFTR-modulator therapy, making it likely that she has more rare or atypical CFTR mutations.  Earnestine indicated that obtaining a copy of the report would not be possible.  We reviewed that without this information there is a small chance for a false negative result.  Earnestine expressed understanding.    Prior to testing, Earnestine's children each have a 1/4 (25%) chance to be a CF carrier based on the family history.  If their partner is also a carrier, with each pregnancy there would be a 25% chance for both parents to pass down a mutation and for the child to have cystic fibrosis.  Earnestine noted that her pregnant daughter's partner is of  ancestry.  Cystic fibrosis is significantly less  common in this population with an estimated carrier frequency of roughly 1/100.  Therefore without any further testing of any relatives, the couple's chance to have a child with cystic fibrosis is quite low, approximately 1/1600  Earnestine's other children may have a higher chance to have a child with CF depending on the ancestry of their partner(s).  If Earnestine is found to be a carrier, carrier testing would be available to her children and/or their partners to further inform these risks.       We reviewed the options for carrier screening including for the CFTR gene only, or expanded carrier screening for a large panel of genes in addition to the CFTR gene.  We had a detailed discussion about the potential costs, benefits, and limitations of the options.  Our conversation included the reality that genetic testing is not perfect, and a normal result does not completely exclude the possibility of being a carrier.  There is also a possibility of identification of a milder CFTR variant associated with variable symptoms and/or a milder CFTR-related disorder.  This testing is designed to offer information for reproductive purposes, but can incidentally identify information that may be relevant to Earnestine's own health.  We also discussed that this screen will not offer information about other untested conditions.  There are also genetic conditions not inherited from a parent that are new in a child, as well as health conditions not genetic in nature.  After discussion Earnestine opted to proceed with carrier screening for the CFTR gene only.  Verbal informed consent was obtained and documented.      Carrier screening will be sent to [facility].  Because we do not know the familial mutations and because there may be a higher chance for them to be more rare/atypical mutations based on Earnestine's niece not being eligible for a CFTR modulator, I recommended sending the more comprehensive CFTR analysis which [facility] has  performed at [facility] lab.  [facility] will contact provide Earnestine directly with an estimate of insurance benefits and option for self-pay billing for $350.  Earnestine preferred a blood draw over at-home collection and orders and requisition paperwork have been placed in her chart.  A sample can be collected at any [facility] lab.      Results are expected in approximately 2-3 weeks and I will contact Earnestine by telephone when results return.  With Earnestine's permission results will be withheld from [facility] for 7 days.  Additional recommendations including possible carrier screening for other relatives will be made at that time.      I appreciate the opportunity to be a part of Earnestine's care today.  She is encouraged to contact me with any additional questions or concerns.     Plan:  1.  CFTR carrier screening facilitated by [facility] to be performed by [facility] for additional sensitivity  2.  Orders have been placed for Earnestine to have a blood sample drawn at any [facility] lab   3.  [facility] will provide Earnestine with an estimation of insurance benefits and option for self-pay billing  4.  I will contact Earnestine when results return, 2-3 weeks after sample collection   5.  Additional recommendations will be made following return of results       Palmira Jiménez MS, [facility]  Genetic Counselor  [facility]  [facility]

## 2024-11-25 ENCOUNTER — LAB (OUTPATIENT)
Dept: LAB | Facility: CLINIC | Age: 59
End: 2024-11-25
Payer: COMMERCIAL

## 2024-11-25 DIAGNOSIS — Z83.49 FAMILY HISTORY OF CYSTIC FIBROSIS: ICD-10-CM

## 2024-11-25 DIAGNOSIS — Z13.9 SCREENING FOR CONDITION: ICD-10-CM

## 2024-11-25 LAB
Lab: NORMAL
PERFORMING LABORATORY: NORMAL
SPECIMEN STATUS: NORMAL
TEST NAME: NORMAL

## 2024-11-25 PROCEDURE — 36415 COLL VENOUS BLD VENIPUNCTURE: CPT

## 2024-11-26 ENCOUNTER — TRANSFERRED RECORDS (OUTPATIENT)
Dept: HEALTH INFORMATION MANAGEMENT | Facility: CLINIC | Age: 59
End: 2024-11-26
Payer: COMMERCIAL

## 2024-12-12 ENCOUNTER — TELEPHONE (OUTPATIENT)
Dept: PULMONOLOGY | Facility: CLINIC | Age: 59
End: 2024-12-12
Payer: COMMERCIAL

## 2024-12-12 LAB
SCANNED LAB RESULT: NORMAL
TEST NAME: NORMAL

## 2024-12-12 NOTE — TELEPHONE ENCOUNTER
December 12, 2024    I called Earnestine on behalf of my colleague, Palmira Jiménez, who is out of office this week. Earnestine received carrier screening for CFTR through [facility] (performed at [facility]), given her family history of cystic fibrosis. Her screening results were NEGATIVE, which significantly reduces the chance that Earnestine is a carrier of CF. Of note, Earnestine's poly T/TG alleles are [7T/10TG];[7T;13TG].    Given that we were unable to confirm the specific mutations present in Earnestine's affected relative, as well as the general limitations of carrier screening, the residual risk that Earnestine is a carrier of CF is non-zero. [facility] estimates that, for individuals of  ancestry, this residual risk is about 1 in 2,500. Earnestine was very relived to receive this news, especially in light of her daughter's current pregnancy. We did review that, while Earnestine's side of the family presented a higher risk for CF carrier status, we cannot exclude the possibility of her daughter's CF carrier status because her father could be a carrier (about 1 in 25 people of  ancestry are). Carrier screening remains available to Earnestine's relatives, as well as anyone planning or currently pregnant.     It was a pleasure speaking with Earnestine. I encouraged her to contact Palmira with any questions, and we will send her a copy of the results in the mail.    Hever Carter MS, [facility]  Genetic Counselor  [facility]  [facility] [place]  Phone: [phone]

## 2024-12-23 ENCOUNTER — MYC REFILL (OUTPATIENT)
Dept: URGENT CARE | Facility: CLINIC | Age: 59
End: 2024-12-23
Payer: COMMERCIAL

## 2024-12-23 DIAGNOSIS — B00.1 HERPES LABIALIS: ICD-10-CM

## 2024-12-23 RX ORDER — VALACYCLOVIR HYDROCHLORIDE 1 G/1
2000 TABLET, FILM COATED ORAL 2 TIMES DAILY
Qty: 12 TABLET | Refills: 1 | Status: SHIPPED | OUTPATIENT
Start: 2024-12-23

## 2025-01-21 ENCOUNTER — MYC MEDICAL ADVICE (OUTPATIENT)
Dept: FAMILY MEDICINE | Facility: CLINIC | Age: 60
End: 2025-01-21

## 2025-01-27 ENCOUNTER — OFFICE VISIT (OUTPATIENT)
Dept: FAMILY MEDICINE | Facility: CLINIC | Age: 60
End: 2025-01-27
Payer: COMMERCIAL

## 2025-01-27 VITALS
SYSTOLIC BLOOD PRESSURE: 103 MMHG | DIASTOLIC BLOOD PRESSURE: 68 MMHG | TEMPERATURE: 98.2 F | RESPIRATION RATE: 14 BRPM | WEIGHT: 160.25 LBS | OXYGEN SATURATION: 99 % | HEART RATE: 75 BPM | BODY MASS INDEX: 25.1 KG/M2

## 2025-01-27 DIAGNOSIS — Z13.0 SCREENING, IRON DEFICIENCY ANEMIA: ICD-10-CM

## 2025-01-27 DIAGNOSIS — E04.9 HYPOTHYROIDISM WITH GOITER: ICD-10-CM

## 2025-01-27 DIAGNOSIS — Z01.818 PREOP GENERAL PHYSICAL EXAM: Primary | ICD-10-CM

## 2025-01-27 DIAGNOSIS — M16.12 PRIMARY OSTEOARTHRITIS OF LEFT HIP: ICD-10-CM

## 2025-01-27 DIAGNOSIS — Z13.220 LIPID SCREENING: ICD-10-CM

## 2025-01-27 DIAGNOSIS — E03.9 HYPOTHYROIDISM WITH GOITER: ICD-10-CM

## 2025-01-27 PROBLEM — H91.93 BILATERAL CHANGE IN HEARING: Status: RESOLVED | Noted: 2024-01-26 | Resolved: 2025-01-27

## 2025-01-27 PROBLEM — H02.403 DROOPY EYELID, BILATERAL: Status: RESOLVED | Noted: 2024-01-26 | Resolved: 2025-01-27

## 2025-01-27 LAB
ANION GAP SERPL CALCULATED.3IONS-SCNC: 11 MMOL/L (ref 7–15)
BUN SERPL-MCNC: 15.5 MG/DL (ref 8–23)
CALCIUM SERPL-MCNC: 9 MG/DL (ref 8.8–10.4)
CHLORIDE SERPL-SCNC: 104 MMOL/L (ref 98–107)
CHOLEST SERPL-MCNC: 176 MG/DL
CREAT SERPL-MCNC: 0.74 MG/DL (ref 0.51–0.95)
EGFRCR SERPLBLD CKD-EPI 2021: >90 ML/MIN/1.73M2
ERYTHROCYTE [DISTWIDTH] IN BLOOD BY AUTOMATED COUNT: 11.7 % (ref 10–15)
FASTING STATUS PATIENT QL REPORTED: NO
FASTING STATUS PATIENT QL REPORTED: NO
FERRITIN SERPL-MCNC: 106 NG/ML (ref 11–328)
GLUCOSE SERPL-MCNC: 91 MG/DL (ref 70–99)
HCO3 SERPL-SCNC: 26 MMOL/L (ref 22–29)
HCT VFR BLD AUTO: 40.5 % (ref 35–47)
HDLC SERPL-MCNC: 60 MG/DL
HGB BLD-MCNC: 13.7 G/DL (ref 11.7–15.7)
LDLC SERPL CALC-MCNC: 97 MG/DL
MCH RBC QN AUTO: 31.3 PG (ref 26.5–33)
MCHC RBC AUTO-ENTMCNC: 33.8 G/DL (ref 31.5–36.5)
MCV RBC AUTO: 93 FL (ref 78–100)
NONHDLC SERPL-MCNC: 116 MG/DL
PLATELET # BLD AUTO: 240 10E3/UL (ref 150–450)
POTASSIUM SERPL-SCNC: 3.8 MMOL/L (ref 3.4–5.3)
RBC # BLD AUTO: 4.38 10E6/UL (ref 3.8–5.2)
SODIUM SERPL-SCNC: 141 MMOL/L (ref 135–145)
TRIGL SERPL-MCNC: 97 MG/DL
WBC # BLD AUTO: 6.1 10E3/UL (ref 4–11)

## 2025-01-27 PROCEDURE — 85041 AUTOMATED RBC COUNT: CPT | Performed by: INTERNAL MEDICINE

## 2025-01-27 PROCEDURE — 85018 HEMOGLOBIN: CPT | Performed by: INTERNAL MEDICINE

## 2025-01-27 PROCEDURE — 80061 LIPID PANEL: CPT | Performed by: INTERNAL MEDICINE

## 2025-01-27 PROCEDURE — 80048 BASIC METABOLIC PNL TOTAL CA: CPT | Performed by: INTERNAL MEDICINE

## 2025-01-27 PROCEDURE — 82728 ASSAY OF FERRITIN: CPT | Performed by: INTERNAL MEDICINE

## 2025-01-27 PROCEDURE — 82565 ASSAY OF CREATININE: CPT | Performed by: INTERNAL MEDICINE

## 2025-01-27 PROCEDURE — 82310 ASSAY OF CALCIUM: CPT | Performed by: INTERNAL MEDICINE

## 2025-01-27 NOTE — NURSING NOTE
"59 year old  Chief Complaint   Patient presents with    Pre-Op Exam       Blood pressure 103/68, pulse 75, temperature 98.2  F (36.8  C), temperature source Temporal, resp. rate 14, weight 72.7 kg (160 lb 4 oz), SpO2 99%, not currently breastfeeding. Body mass index is 25.1 kg/m .  Patient Active Problem List   Diagnosis    Hypothyroidism with goiter    Family history of colon cancer    Non-toxic multinodular goiter    Hip pain, left    Shoulder pain, left    Osteopenia of lumbar spine       Wt Readings from Last 2 Encounters:   01/27/25 72.7 kg (160 lb 4 oz)   08/07/24 70.3 kg (155 lb)     BP Readings from Last 3 Encounters:   01/27/25 103/68   08/07/24 111/73   01/24/24 107/72         Current Outpatient Medications   Medication Sig Dispense Refill    calcium carbonate 500 mg, elemental, (OSCAL 500) 1250 (500 Ca) MG TABS tablet Take by mouth daily      ferrous sulfate (FEROSUL) 325 (65 Fe) MG tablet       levothyroxine (SYNTHROID/LEVOTHROID) 125 MCG tablet Take one daily Mon - Sat then take 1.5 tablets on Sunday 90 tablet 3    valACYclovir (VALTREX) 1000 mg tablet Take 2 tablets (2,000 mg) by mouth 2 times daily. At first sign of outbreak. 12 tablet 1    vitamin C (ASCORBIC ACID) 100 MG tablet        No current facility-administered medications for this visit.       Social History     Tobacco Use    Smoking status: Never    Smokeless tobacco: Never   Vaping Use    Vaping status: Never Used   Substance Use Topics    Alcohol use: Yes     Comment: 7 drinks a week    Drug use: Never       Health Maintenance Due   Topic Date Due    GLUCOSE  Never done    Pneumococcal Vaccine: 50+ Years (1 of 1 - PCV) Never done    INFLUENZA VACCINE (1) 09/01/2024    YEARLY PREVENTIVE VISIT  01/24/2025       No results found for: \"PAP\"      January 27, 2025 3:33 PM    "

## 2025-01-27 NOTE — PROGRESS NOTES
Preoperative Evaluation  [facility] [address], [address]  [facility] [address]  Phone: [phone]  Fax: [phone]  Primary Provider: Trish Aguilar MD  Pre-op Performing Provider: Trish Aguilar MD  Jan 27, 2025 1/22/2025   Surgical Information   What procedure is being done? Total hip replacement, left side   Facility or Hospital where procedure/surgery will be performed: [facility], [place]   Who is doing the procedure / surgery? Dr. Nigel Oviedo   Date of surgery / procedure: Feb 24, 2025   Time of surgery / procedure: TBD   Where do you plan to recover after surgery? at home with family     Fax number for surgical facility: [phone]    Assessment & Plan     The proposed surgical procedure is considered INTERMEDIATE risk.  (Z01.818) Preop general physical exam  (primary encounter diagnosis)  (M16.12) Primary osteoarthritis of left hip  Comment: elective left hip replacement, has failed conservative therapy for osteoarthritis  Plan: CBC with platelets, Basic metabolic panel,         Ferritin        No contraindication to surgery, proceed as planned    (E03.9,  E04.9) Hypothyroidism with goiter  Comment: s/p left thyroid lobe removal, followed by endocrinology  TSH   Date Value Ref Range Status   08/07/2024 0.79 0.30 - 4.20 uIU/mL Final   04/20/2022 2.83 0.40 - 4.00 mU/L Final   06/08/2020 2.93 0.40 - 4.00 mU/L Final   Plan: continue levothyroxine, 125mcg daily Mon thru Sat then 1.5 tab on Sunday    (Z13.220) Lipid screening  Comment: not fasting, she is requesting lipid screening  Plan: Lipid Profile  No current statin use.          - No identified additional risk factors other than previously addressed    Preoperative Medication Instructions    How to Take Your Medication Before Surgery  Hold any vitamins, herbs, supplements, aspirin, ibuprofen, naproxen for 10 days prior to surgery.   Do not take any  medications on the morning of your surgery, OR take thyroid medicine with sip of water on morning of surgery  You may take acetaminophen (Tylenol) in the 10 days prior to surgery.  Do note exceed 3000mg in 24 hr    High fiber diet  Mirlax powder any day you take a narcotic    Recommendation  Approval given to proceed with proposed procedure, without further diagnostic evaluation.    Demetris Colby is a 59 year old, presenting for the following:  Pre-Op Exam    HPI related to upcoming procedure:   Earnestine Mcpherson is a 59 year old female with hx of advanced degenerative changes in her left hip, as noted by advanced changes on XRays. Clinically, she has had left sided hip pain x 2 yr. Has pain with extended walking and pivoting. She has tried PT. She is to undergo elective LEFT total hip arthroplasty.         1/22/2025   Pre-Op Questionnaire   Have you ever had a heart attack or stroke? No   Have you ever had surgery on your heart or blood vessels, such as a stent placement, a coronary artery bypass, or surgery on an artery in your head, neck, heart, or legs? No   Do you have chest pain with activity? No   Do you have a history of heart failure? No   Do you currently have a cold, bronchitis or symptoms of other infection? No   Do you have a cough, shortness of breath, or wheezing? No   Do you or anyone in your family have previous history of blood clots? No   Do you or does anyone in your family have a serious bleeding problem such as prolonged bleeding following surgeries or cuts? No   Have you ever had problems with anemia or been told to take iron pills? (!) YES ---takes iron due to low hemoglobin readings   Have you had any abnormal blood loss such as black, tarry or bloody stools, or abnormal vaginal bleeding? No   Have you ever had a blood transfusion? No   Are you willing to have a blood transfusion if it is medically needed before, during, or after your surgery? Yes   Have you or any of your relatives  ever had problems with anesthesia? No   Do you have sleep apnea, excessive snoring or daytime drowsiness? No   Do you have any artifical heart valves or other implanted medical devices like a pacemaker, defibrillator, or continuous glucose monitor? No   Do you have artificial joints? No   Are you allergic to latex? No     Health Care Directive  Patient does not have a Health Care Directive: NONE ON FILE---Patient states has Advance Directive and will bring in a copy to clinic.    Preoperative Review of    reviewed - no record of controlled substances prescribed.      Hypothyroidism  Hx of goiter, s/p left side lobe resection   Levothyroxine 125mcg daily Mon- Sat then 1.5 on Sun  Current constipation, diarrhea, palpitations, --->>none  TSH   Date Value Ref Range Status   2024 0.79 0.30 - 4.20 uIU/mL Final   2022 2.83 0.40 - 4.00 mU/L Final   2020 2.93 0.40 - 4.00 mU/L Final         Patient Active Problem List    Diagnosis Date Noted    Osteopenia of lumbar spine 2024     Priority: Medium    Shoulder pain, left 2022     Priority: Medium    Hip pain, left 2022     Priority: Medium    Non-toxic multinodular goiter 2021     Priority: Medium     Thyroid US 2021 with multiple benign nodules, follow up in 2022      Hypothyroidism with goiter 2019     Priority: Medium    Family history of colon cancer 2019     Priority: Medium      Past Medical History:   Diagnosis Date    Thyroid disease      Past Surgical History:   Procedure Laterality Date     SECTION       SECTION      COLONOSCOPY  2016    due every 5 yr    COLONOSCOPY N/A 7/15/2021    Procedure: COLONOSCOPY, WITH POLYPECTOMY;  Surgeon: Ramila Gay MD;  Location: [facility] OR    FINGER SURGERY Right 1966    right hand ring finger    THYROIDECTOMY Left 10/19/2022    Procedure: Thyroid Lobectomy - Left;  Surgeon: Adrienne Desai MD;  Location:  OR     Current Outpatient  Medications   Medication Sig Dispense Refill    calcium carbonate 500 mg, elemental, (OSCAL 500) 1250 (500 Ca) MG TABS tablet Take by mouth daily      ferrous sulfate (FEROSUL) 325 (65 Fe) MG tablet Take 325 mg by mouth daily (with breakfast).      levothyroxine (SYNTHROID/LEVOTHROID) 125 MCG tablet Take one daily Mon - Sat then take 1.5 tablets on Sunday 90 tablet 3    valACYclovir (VALTREX) 1000 mg tablet Take 2 tablets (2,000 mg) by mouth 2 times daily. At first sign of outbreak. 12 tablet 1    vitamin C (ASCORBIC ACID) 100 MG tablet          Allergies   Allergen Reactions    Penicillins Anaphylaxis     Breathing problems + turned blue        Social History     Tobacco Use    Smoking status: Never    Smokeless tobacco: Never   Substance Use Topics    Alcohol use: Yes     Comment: 7 drinks a week     Family History   Problem Relation Age of Onset    Hypertension Mother     Arthritis Mother     Glaucoma Mother     Diabetes Father 80        thin habitus    Glaucoma Maternal Grandmother     Colon Cancer Paternal Grandfather 65    Colon Polyps Brother     Diabetes Brother 40        thin habitus    Hypothyroidism Sister     Cancer Sister         sublingual cancer (salivary?), surgery, no chemo    Hypothyroidism Sister     Crohn's Disease Daughter 21    Liver Cancer Paternal Aunt     Cystic Fibrosis Niece     Colon Cancer Paternal Cousin 50        paternal side    Glaucoma Other     Melanoma Paternal Uncle     Macular Degeneration No family hx of      History   Drug Use Unknown             Review of Systems  Constitutional, neuro, ENT, endocrine, pulmonary, cardiac, gastrointestinal, genitourinary, musculoskeletal, integument and psychiatric systems are negative, except as otherwise noted.    Objective    /68 (BP Location: Left arm, Patient Position: Sitting, Cuff Size: Adult Large)   Pulse 75   Temp 98.2  F (36.8  C) (Temporal)   Resp 14   Wt 72.7 kg (160 lb 4 oz)   SpO2 99%   BMI 25.10 kg/m     Estimated  "body mass index is 25.1 kg/m  as calculated from the following:    Height as of 8/7/24: 1.702 m (5' 7\").    Weight as of this encounter: 72.7 kg (160 lb 4 oz).  Physical Exam  GENERAL: alert and no distress  EYES: Eyes grossly normal to inspection, PERRL and conjunctivae and sclerae normal  HENT: ear canals and TM's normal, nose and mouth without ulcers or lesions  NECK: no adenopathy, left side of thyroid absent, right side nontender  RESP: lungs clear to auscultation   CV: regular rate and rhythm, normal S1 S2, no murmur  ABDOMEN: soft, nontender, no hepatosplenomegaly, no masses and bowel sounds normal  MS: no gross musculoskeletal defects noted, no edema, did not examine left hip  SKIN: no suspicious lesions or rashes  NEURO: Normal strength and tone, mentation intact and speech normal  PSYCH: mentation appears normal, affect normal/bright  EXT: no edema, pulses full    Recent Labs   Lab Test 08/07/24  1158   CR 0.72        Diagnostics  Recent Results (from the past 24 hours)   CBC with platelets    Collection Time: 01/27/25  4:12 PM   Result Value Ref Range    WBC Count 6.1 4.0 - 11.0 10e3/uL    RBC Count 4.38 3.80 - 5.20 10e6/uL    Hemoglobin 13.7 11.7 - 15.7 g/dL    Hematocrit 40.5 35.0 - 47.0 %    MCV 93 78 - 100 fL    MCH 31.3 26.5 - 33.0 pg    MCHC 33.8 31.5 - 36.5 g/dL    RDW 11.7 10.0 - 15.0 %    Platelet Count 240 150 - 450 10e3/uL   Basic metabolic panel    Collection Time: 01/27/25  4:12 PM   Result Value Ref Range    Sodium 141 135 - 145 mmol/L    Potassium 3.8 3.4 - 5.3 mmol/L    Chloride 104 98 - 107 mmol/L    Carbon Dioxide (CO2) 26 22 - 29 mmol/L    Anion Gap 11 7 - 15 mmol/L    Urea Nitrogen 15.5 8.0 - 23.0 mg/dL    Creatinine 0.74 0.51 - 0.95 mg/dL    GFR Estimate >90 >60 mL/min/1.73m2    Calcium 9.0 8.8 - 10.4 mg/dL    Glucose 91 70 - 99 mg/dL    Patient Fasting > 8hrs? No    Ferritin    Collection Time: 01/27/25  4:12 PM   Result Value Ref Range    Ferritin 106 11 - 328 ng/mL   Lipid Profile "    Collection Time: 01/27/25  4:12 PM   Result Value Ref Range    Cholesterol 176 <200 mg/dL    Triglycerides 97 <150 mg/dL    Direct Measure HDL 60 >=50 mg/dL    LDL Cholesterol Calculated 97 <100 mg/dL    Non HDL Cholesterol 116 <130 mg/dL    Patient Fasting > 8hrs? No           No EKG required, no history of coronary heart disease, significant arrhythmia, peripheral arterial disease or other structural heart disease.    Revised Cardiac Risk Index (RCRI)  The patient has the following serious cardiovascular risks for perioperative complications:   - No serious cardiac risks = 0 points     RCRI Interpretation: 0 points: Class I (very low risk - 0.4% complication rate)         Signed Electronically by: Trish Aguilar MD  A copy of this evaluation report is provided to the requesting physician.

## 2025-01-27 NOTE — PATIENT INSTRUCTIONS
How to Take Your Medication Before Surgery  Preoperative Medication Instructions     How to Take Your Medication Before Surgery  Hold any vitamins, herbs, supplements, aspirin, ibuprofen, naproxen for 10 days prior to surgery.   Do not take any medications on the morning of your surgery, OR take thyroid medicine with sip of water on morning of surgery  You may take acetaminophen (Tylenol) in the 10 days prior to surgery.  Do note exceed 3000mg in 24 hr    High fiber diet  Mirlax powder any day you take a narcotic         Patient Education   Preparing for Your Surgery  For Adults  Getting started  In most cases, a nurse will call to review your health history and instructions. They will give you an arrival time based on your scheduled surgery time. Please be ready to share:  Your doctor's clinic name and phone number  Your medical, surgical, and anesthesia history  A list of allergies and sensitivities  A list of medicines, including herbal treatments and over-the-counter drugs  Whether the patient has a legal guardian (ask how to send us the papers in advance)  Note: You may not receive a call if you were seen at our PAC (Preoperative Assessment Center).  Please tell us if you're pregnant--or if there's any chance you might be pregnant. Some surgeries may injure a fetus (unborn baby), so they require a pregnancy test. Surgeries that are safe for a fetus don't always need a test, and you can choose whether to have one.   Preparing for surgery  Within 10 to 30 days of surgery: Have a pre-op exam (sometimes called an H&P, or History and Physical). This can be done at a clinic or pre-operative center.  If you're having a , you may not need this exam. Talk to your care team.  At your pre-op exam, talk to your care team about all medicines you take. (This includes CBD oil and any drugs, such as THC, marijuana, and other forms of cannabis.) If you need to stop any medicine before surgery, ask when to start taking  it again.  This is for your safety. Many medicines and drugs can make you bleed too much during surgery. Some change how well surgery (anesthesia) drugs work.  Call your insurance company to let them know you're having surgery. (If you don't have insurance, call [phone].)  Call your clinic if there's any change in your health. This includes a scrape or scratch near the surgery site, or any signs of a cold (sore throat, runny nose, cough, rash, fever).  Eating and drinking guidelines  For your safety: Unless your surgeon tells you otherwise, follow the guidelines below.  Eat and drink as normal until 8 hours before you arrive for surgery. After that, no food or milk. You can spit out gum when you arrive.  Drink clear liquids until 2 hours before you arrive. These are liquids you can see through, like water, Gatorade, and Propel Water. They also include plain black coffee and tea (no cream or milk).  No alcohol for 24 hours before you arrive. The night before surgery, stop any drinks that contain THC.  If your care team tells you to take medicine on the morning of surgery, it's okay to take it with a sip of water. No other medicines or drugs are allowed (including CBD oil)--follow your care team's instructions.  If you have questions the day of surgery, call your hospital or surgery center.   Preventing infection  Shower or bathe the night before and the morning of surgery. Follow the instructions your clinic gave you. (If no instructions, use regular soap.)  Don't shave or clip hair near your surgery site. We'll remove the hair if needed.  Don't smoke or vape the morning of surgery. No chewing tobacco for 6 hours before you arrive. A nicotine patch is okay. You may spit out nicotine gum when you arrive.  For some surgeries, the surgeon will tell you to fully quit smoking and nicotine.  We will make every effort to keep you safe from infection. We will:  Clean our hands often with soap and water (or an  alcohol-based hand rub).  Clean the skin at your surgery site with a special soap that kills germs.  Give you a special gown to keep you warm. (Cold raises the risk of infection.)  Wear hair covers, masks, gowns, and gloves during surgery.  Give antibiotic medicine, if prescribed. Not all surgeries need this medicine.  What to bring on the day of surgery  Photo ID and insurance card  Copy of your health care directive, if you have one  Glasses and hearing aids (bring cases)  You can't wear contacts during surgery  Inhaler and eye drops, if you use them (tell us about these when you arrive)  CPAP machine or breathing device, if you use them  A few personal items, if spending the night  If you have . . .  A pacemaker, ICD (cardiac defibrillator), or other implant: Bring the ID card.  An implanted stimulator: Bring the remote control.  A legal guardian: Bring a copy of the certified (court-stamped) guardianship papers.  Please remove any jewelry, including body piercings. Leave jewelry and other valuables at home.  If you're going home the day of surgery  You must have a responsible adult drive you home. They should stay with you overnight as well.  If you don't have someone to stay with you, and you aren't safe to go home alone, we may keep you overnight. Insurance often won't pay for this.  After surgery  If it's hard to control your pain or you need more pain medicine, please call your surgeon's office.  Questions?   If you have any questions for your care team, list them here:   ____________________________________________________________________________________________________________________________________________________________________________________________________________________________________________________________  For informational purposes only. Not to replace the advice of your health care provider. Copyright   2003, 2019 [facility]. All rights reserved. Clinically reviewed by Luis  MD Patricia. [place] [id number] - REV 08/24.

## 2025-02-06 ENCOUNTER — TRANSFERRED RECORDS (OUTPATIENT)
Dept: HEALTH INFORMATION MANAGEMENT | Facility: CLINIC | Age: 60
End: 2025-02-06
Payer: COMMERCIAL

## 2025-03-15 ENCOUNTER — HEALTH MAINTENANCE LETTER (OUTPATIENT)
Age: 60
End: 2025-03-15

## 2025-04-29 ENCOUNTER — TRANSFERRED RECORDS (OUTPATIENT)
Dept: HEALTH INFORMATION MANAGEMENT | Facility: CLINIC | Age: 60
End: 2025-04-29
Payer: COMMERCIAL

## 2025-07-17 ENCOUNTER — VIRTUAL VISIT (OUTPATIENT)
Dept: FAMILY MEDICINE | Facility: CLINIC | Age: 60
End: 2025-07-17
Payer: COMMERCIAL

## 2025-07-17 ENCOUNTER — LAB (OUTPATIENT)
Dept: LAB | Facility: CLINIC | Age: 60
End: 2025-07-17
Attending: FAMILY MEDICINE
Payer: COMMERCIAL

## 2025-07-17 DIAGNOSIS — R05.9 COUGH, UNSPECIFIED TYPE: Primary | ICD-10-CM

## 2025-07-17 DIAGNOSIS — R05.9 COUGH, UNSPECIFIED TYPE: ICD-10-CM

## 2025-07-17 LAB
DEPRECATED S PYO AG THROAT QL EIA: NEGATIVE
FLUAV RNA SPEC QL NAA+PROBE: NEGATIVE
FLUBV RNA RESP QL NAA+PROBE: NEGATIVE
RSV RNA SPEC NAA+PROBE: NEGATIVE
S PYO DNA THROAT QL NAA+PROBE: NOT DETECTED
SARS-COV-2 RNA RESP QL NAA+PROBE: NEGATIVE

## 2025-07-17 NOTE — PROGRESS NOTES
Earnestine is a 59 year old who is being evaluated via a billable video visit.    How would you like to obtain your AVS? MyChart  If the video visit is dropped, the invitation should be resent by: Text to cell phone: [phone]  Will anyone else be joining your video visit? No  {If patient encounters technical issues they should call [phone] :[id number]}    {PROVIDER CHARTING PREFERENCE:[id number]}    Subjective   Earnestine is a 59 year old, presenting for the following health issues:  Throat Problem      7/17/2025    11:23 AM   Additional Questions   Roomed by adeline     History of Present Illness       Reason for visit:  Sore throat, cough, headache x3 days  Symptom onset:  3-7 days ago  Symptoms include:  Sore throat, cough, headache  Symptom intensity:  Severe  Symptom progression:  Worsening  Had these symptoms before:  Yes  Has tried/received treatment for these symptoms:  Yes  Previous treatment was successful:  Yes  Prior treatment description:  Antibiotics   She is taking medications regularly.      Symptoms started with sore throat on July 15, followed by dry cough, headaches; Symptoms have been persistent since then, she felt hot but did not check her temperature, no nausea or vomiting, no exposure to illness.  Current treatment included rest fluid, cough drops as needed.      Review of Systems  Constitutional, HEENT, cardiovascular, pulmonary, gi and gu systems are negative, except as otherwise noted.      Objective           Vitals:  No vitals were obtained today due to virtual visit.    Physical Exam   GENERAL: alert and no distress  EYES: Eyes grossly normal to inspection.  No discharge or erythema, or obvious scleral/conjunctival abnormalities.  RESP: No audible wheeze, cough, or visible cyanosis.    SKIN: Visible skin clear. No significant rash, abnormal pigmentation or lesions.  NEURO: Cranial nerves grossly intact.  Mentation and speech appropriate for age.  PSYCH: Appropriate affect, tone, and pace of  words    No results found for any visits on 07/17/25.      Video-Visit Details    Type of service:  Video Visit   Originating Location (pt. Location): Home  {PROVIDER LOCATION On-site should be selected for visits conducted from your clinic location or adjoining [facility] hospital, academic office, or other nearby [facility] building. Off-site should be selected for all other provider locations, including home:[id number]}  Distant Location (provider location):  On-site  Platform used for Video Visit: Zoom (Telehealth)  Signed Electronically by: Shanelle Alonso MD  {Email feedback regarding this note to [email or web address]   :[id number]}

## 2025-08-22 DIAGNOSIS — E04.9 HYPOTHYROIDISM WITH GOITER: ICD-10-CM

## 2025-08-22 DIAGNOSIS — E03.9 HYPOTHYROIDISM WITH GOITER: ICD-10-CM

## 2025-08-25 ENCOUNTER — MYC MEDICAL ADVICE (OUTPATIENT)
Dept: ENDOCRINOLOGY | Facility: CLINIC | Age: 60
End: 2025-08-25
Payer: COMMERCIAL

## 2025-08-25 RX ORDER — LEVOTHYROXINE SODIUM 125 UG/1
TABLET ORAL
Qty: 96 TABLET | Refills: 0 | Status: SHIPPED | OUTPATIENT
Start: 2025-08-25

## 2025-09-03 ENCOUNTER — VIRTUAL VISIT (OUTPATIENT)
Dept: FAMILY MEDICINE | Facility: CLINIC | Age: 60
End: 2025-09-03
Payer: COMMERCIAL

## 2025-09-03 DIAGNOSIS — F41.9 ANXIETY: Primary | ICD-10-CM

## 2025-09-03 RX ORDER — ALPRAZOLAM 0.25 MG
TABLET ORAL
Qty: 6 TABLET | Refills: 0 | Status: SHIPPED | OUTPATIENT
Start: 2025-09-03

## 2025-09-03 ASSESSMENT — ANXIETY QUESTIONNAIRES
4. TROUBLE RELAXING: NOT AT ALL
7. FEELING AFRAID AS IF SOMETHING AWFUL MIGHT HAPPEN: NOT AT ALL
2. NOT BEING ABLE TO STOP OR CONTROL WORRYING: NOT AT ALL
5. BEING SO RESTLESS THAT IT IS HARD TO SIT STILL: NOT AT ALL
GAD7 TOTAL SCORE: 1
1. FEELING NERVOUS, ANXIOUS, OR ON EDGE: SEVERAL DAYS
7. FEELING AFRAID AS IF SOMETHING AWFUL MIGHT HAPPEN: NOT AT ALL
GAD7 TOTAL SCORE: 1
6. BECOMING EASILY ANNOYED OR IRRITABLE: NOT AT ALL
8. IF YOU CHECKED OFF ANY PROBLEMS, HOW DIFFICULT HAVE THESE MADE IT FOR YOU TO DO YOUR WORK, TAKE CARE OF THINGS AT HOME, OR GET ALONG WITH OTHER PEOPLE?: NOT DIFFICULT AT ALL
3. WORRYING TOO MUCH ABOUT DIFFERENT THINGS: NOT AT ALL
IF YOU CHECKED OFF ANY PROBLEMS ON THIS QUESTIONNAIRE, HOW DIFFICULT HAVE THESE PROBLEMS MADE IT FOR YOU TO DO YOUR WORK, TAKE CARE OF THINGS AT HOME, OR GET ALONG WITH OTHER PEOPLE: NOT DIFFICULT AT ALL
GAD7 TOTAL SCORE: 1

## 2025-09-03 ASSESSMENT — PATIENT HEALTH QUESTIONNAIRE - PHQ9
10. IF YOU CHECKED OFF ANY PROBLEMS, HOW DIFFICULT HAVE THESE PROBLEMS MADE IT FOR YOU TO DO YOUR WORK, TAKE CARE OF THINGS AT HOME, OR GET ALONG WITH OTHER PEOPLE: NOT DIFFICULT AT ALL
SUM OF ALL RESPONSES TO PHQ QUESTIONS 1-9: 0
SUM OF ALL RESPONSES TO PHQ QUESTIONS 1-9: 0

## (undated) DEVICE — PACK MAJOR HEAD AND NECK RIDGES

## (undated) DEVICE — SU MONOCRYL 4-0 P-3 18" UND Y494G

## (undated) DEVICE — LINEN HALF SHEET 5512

## (undated) DEVICE — GOWN IMPERVIOUS 2XL BLUE

## (undated) DEVICE — SPECIMEN CONTAINER 3OZ W/FORMALIN 59901

## (undated) DEVICE — FCP BIOPSY RADIAL JAW 4 JUMBO 3.2MM CHANNEL M00513370

## (undated) DEVICE — GLOVE PROTEXIS BLUE W/NEU-THERA 6.5  2D73EB65

## (undated) DEVICE — DRSG STERI STRIP 1/2X4" R1547

## (undated) DEVICE — ESU GROUND PAD ADULT W/CORD E7507

## (undated) DEVICE — ESU CORD BIPOLAR GREEN 10-4000

## (undated) DEVICE — MANIFOLD NEPTUNE 4 PORT 700-20

## (undated) DEVICE — KIT ENDO TURNOVER/PROCEDURE CARRY-ON 101822

## (undated) DEVICE — SUCTION MANIFOLD NEPTUNE 2 SYS 1 PORT 702-025-000

## (undated) DEVICE — SU VICRYL 3-0 SH 27" UND J416H

## (undated) DEVICE — SU VICRYL 2-0 TIE 12X18" J905T

## (undated) DEVICE — ENDO TRAP POLYP E-TRAP 00711099

## (undated) DEVICE — SPONGE KITTNER 30-101

## (undated) DEVICE — GLOVE PROTEXIS POWDER FREE 6.5 ORTHOPEDIC 2D73ET65

## (undated) DEVICE — SOL WATER IRRIG 500ML BOTTLE 2F7113

## (undated) DEVICE — ESU LIGASURE DISSECTOR EXACT LF2019

## (undated) DEVICE — SU VICRYL 4-0 TIE 12X18" DYED J103T

## (undated) DEVICE — TUBING SUCTION 12"X1/4" N612

## (undated) DEVICE — PREP CHLORAPREP W/ORANGE TINT 10.5ML 260715

## (undated) DEVICE — ADH SKIN CLOSURE PREMIERPRO EXOFIN 1.0ML 3470

## (undated) DEVICE — ESU HOLSTER PLASTIC DISP E2400

## (undated) DEVICE — SUCTION CANISTER MEDIVAC LINER 3000ML W/LID 65651-530

## (undated) DEVICE — SURGICEL FIBRILLAR HEMOSTAT 1"X2" 1961

## (undated) DEVICE — SU SILK 3-0 SH 30" K832H

## (undated) DEVICE — LINEN FULL SHEET 5511

## (undated) DEVICE — LINEN ORTHO ACL PACK 5447

## (undated) DEVICE — SOL NACL 0.9% IRRIG 1000ML BOTTLE 2F7124

## (undated) DEVICE — BAG CLEAR TRASH 1.3M 39X33" P4040C

## (undated) DEVICE — SU VICRYL 3-0 TIE 12X18" J904T

## (undated) DEVICE — PROBE NEUROSIGN MONOPOLAR DISP 2MM 3602-00-TE

## (undated) RX ORDER — FENTANYL CITRATE 50 UG/ML
INJECTION, SOLUTION INTRAMUSCULAR; INTRAVENOUS
Status: DISPENSED
Start: 2022-10-19

## (undated) RX ORDER — DEXMEDETOMIDINE HYDROCHLORIDE 4 UG/ML
INJECTION, SOLUTION INTRAVENOUS
Status: DISPENSED
Start: 2022-10-19

## (undated) RX ORDER — KETOROLAC TROMETHAMINE 30 MG/ML
INJECTION, SOLUTION INTRAMUSCULAR; INTRAVENOUS
Status: DISPENSED
Start: 2022-10-19

## (undated) RX ORDER — DIPHENHYDRAMINE HYDROCHLORIDE 50 MG/ML
INJECTION INTRAMUSCULAR; INTRAVENOUS
Status: DISPENSED
Start: 2021-07-15

## (undated) RX ORDER — PROPOFOL 10 MG/ML
INJECTION, EMULSION INTRAVENOUS
Status: DISPENSED
Start: 2022-10-19

## (undated) RX ORDER — LIDOCAINE HYDROCHLORIDE 10 MG/ML
INJECTION, SOLUTION EPIDURAL; INFILTRATION; INTRACAUDAL; PERINEURAL
Status: DISPENSED
Start: 2022-10-19

## (undated) RX ORDER — DEXAMETHASONE SODIUM PHOSPHATE 4 MG/ML
INJECTION, SOLUTION INTRA-ARTICULAR; INTRALESIONAL; INTRAMUSCULAR; INTRAVENOUS; SOFT TISSUE
Status: DISPENSED
Start: 2022-10-19

## (undated) RX ORDER — EPHEDRINE SULFATE 50 MG/ML
INJECTION, SOLUTION INTRAMUSCULAR; INTRAVENOUS; SUBCUTANEOUS
Status: DISPENSED
Start: 2022-10-19

## (undated) RX ORDER — ONDANSETRON 2 MG/ML
INJECTION INTRAMUSCULAR; INTRAVENOUS
Status: DISPENSED
Start: 2022-10-19

## (undated) RX ORDER — ONDANSETRON 2 MG/ML
INJECTION INTRAMUSCULAR; INTRAVENOUS
Status: DISPENSED
Start: 2021-07-15

## (undated) RX ORDER — FENTANYL CITRATE 50 UG/ML
INJECTION, SOLUTION INTRAMUSCULAR; INTRAVENOUS
Status: DISPENSED
Start: 2021-07-15

## (undated) RX ORDER — BUPIVACAINE HYDROCHLORIDE 5 MG/ML
INJECTION, SOLUTION EPIDURAL; INTRACAUDAL
Status: DISPENSED
Start: 2022-10-19

## (undated) RX ORDER — CLINDAMYCIN PHOSPHATE 900 MG/50ML
INJECTION, SOLUTION INTRAVENOUS
Status: DISPENSED
Start: 2022-10-19

## (undated) RX ORDER — ACETAMINOPHEN 325 MG/1
TABLET ORAL
Status: DISPENSED
Start: 2022-10-19